# Patient Record
Sex: MALE | Race: WHITE | NOT HISPANIC OR LATINO | URBAN - METROPOLITAN AREA
[De-identification: names, ages, dates, MRNs, and addresses within clinical notes are randomized per-mention and may not be internally consistent; named-entity substitution may affect disease eponyms.]

---

## 2017-05-08 ENCOUNTER — APPOINTMENT (RX ONLY)
Dept: URBAN - METROPOLITAN AREA CLINIC 349 | Facility: CLINIC | Age: 77
Setting detail: DERMATOLOGY
End: 2017-05-08

## 2017-05-08 DIAGNOSIS — D69.2 OTHER NONTHROMBOCYTOPENIC PURPURA: ICD-10-CM

## 2017-05-08 DIAGNOSIS — D22 MELANOCYTIC NEVI: ICD-10-CM

## 2017-05-08 DIAGNOSIS — L57.0 ACTINIC KERATOSIS: ICD-10-CM

## 2017-05-08 PROBLEM — D22.5 MELANOCYTIC NEVI OF TRUNK: Status: ACTIVE | Noted: 2017-05-08

## 2017-05-08 PROCEDURE — 17003 DESTRUCT PREMALG LES 2-14: CPT

## 2017-05-08 PROCEDURE — 17000 DESTRUCT PREMALG LESION: CPT

## 2017-05-08 PROCEDURE — 99213 OFFICE O/P EST LOW 20 MIN: CPT | Mod: 25

## 2017-05-08 PROCEDURE — ? COUNSELING

## 2017-05-08 PROCEDURE — ? LIQUID NITROGEN

## 2017-05-08 ASSESSMENT — LOCATION SIMPLE DESCRIPTION DERM
LOCATION SIMPLE: RIGHT UPPER BACK
LOCATION SIMPLE: RIGHT FOREHEAD
LOCATION SIMPLE: RIGHT EAR
LOCATION SIMPLE: LEFT CHEEK
LOCATION SIMPLE: SCALP
LOCATION SIMPLE: RIGHT UPPER BACK
LOCATION SIMPLE: LEFT EAR
LOCATION SIMPLE: LEFT FOREHEAD
LOCATION SIMPLE: LEFT ZYGOMA
LOCATION SIMPLE: RIGHT CHEEK

## 2017-05-08 ASSESSMENT — LOCATION DETAILED DESCRIPTION DERM
LOCATION DETAILED: RIGHT SUPERIOR FOREHEAD
LOCATION DETAILED: LEFT CENTRAL ZYGOMA
LOCATION DETAILED: LEFT SUPERIOR FOREHEAD
LOCATION DETAILED: LEFT LATERAL MALAR CHEEK
LOCATION DETAILED: LEFT INCISURA INTERTRAGICA
LOCATION DETAILED: RIGHT INFERIOR MEDIAL UPPER BACK
LOCATION DETAILED: LEFT INFERIOR HELIX
LOCATION DETAILED: RIGHT SUPERIOR MEDIAL UPPER BACK
LOCATION DETAILED: LEFT CENTRAL FRONTAL SCALP
LOCATION DETAILED: RIGHT SUPERIOR HELIX
LOCATION DETAILED: RIGHT SUPERIOR LATERAL BUCCAL CHEEK
LOCATION DETAILED: LEFT INFERIOR FOREHEAD
LOCATION DETAILED: LEFT SUPERIOR HELIX

## 2017-05-08 ASSESSMENT — LOCATION ZONE DERM
LOCATION ZONE: TRUNK
LOCATION ZONE: TRUNK
LOCATION ZONE: FACE
LOCATION ZONE: SCALP
LOCATION ZONE: EAR

## 2017-05-08 ASSESSMENT — PAIN INTENSITY VAS: HOW INTENSE IS YOUR PAIN 0 BEING NO PAIN, 10 BEING THE MOST SEVERE PAIN POSSIBLE?: NO PAIN

## 2017-05-08 NOTE — PROCEDURE: LIQUID NITROGEN
Number Of Freeze-Thaw Cycles: 2 freeze-thaw cycles
Render Post-Care Instructions In Note?: no
Post-Care Instructions: I reviewed with the patient in detail post-care instructions. Patient is to wear sunprotection, and avoid picking at any of the treated lesions. Pt may apply Vaseline to crusted or scabbing areas.
Consent: The patient's consent was obtained including but not limited to risks of crusting, scabbing, blistering, scarring, darker or lighter pigmentary change, recurrence, incomplete removal and infection.
Detail Level: Detailed
Duration Of Freeze Thaw-Cycle (Seconds): 3

## 2017-08-01 PROBLEM — E11.8 CONTROLLED TYPE 2 DIABETES MELLITUS WITH COMPLICATION, WITH LONG-TERM CURRENT USE OF INSULIN (HCC): Status: ACTIVE | Noted: 2017-08-01

## 2017-08-01 PROBLEM — B35.1 ONYCHOMYCOSIS: Status: ACTIVE | Noted: 2017-08-01

## 2017-08-01 PROBLEM — Z79.4 CONTROLLED TYPE 2 DIABETES MELLITUS WITH COMPLICATION, WITH LONG-TERM CURRENT USE OF INSULIN (HCC): Status: ACTIVE | Noted: 2017-08-01

## 2017-10-18 ENCOUNTER — HOSPITAL ENCOUNTER (OUTPATIENT)
Dept: CT IMAGING | Age: 77
Discharge: HOME | End: 2017-10-18
Attending: INTERNAL MEDICINE
Payer: MEDICARE

## 2017-10-18 DIAGNOSIS — R07.9 RIGHT-SIDED CHEST PAIN: ICD-10-CM

## 2017-10-18 DIAGNOSIS — R10.9 FLANK PAIN: ICD-10-CM

## 2017-10-18 PROCEDURE — 74176 CT ABD & PELVIS W/O CONTRAST: CPT

## 2017-10-18 NOTE — PROGRESS NOTES
Since this test has not provided the answer -and he continues to have pain in the right side - I am ordering CT  chest  Now. Do let him know

## 2017-11-29 ENCOUNTER — ANESTHESIA EVENT (OUTPATIENT)
Dept: ENDOSCOPY | Age: 77
End: 2017-11-29
Payer: MEDICARE

## 2017-11-29 ENCOUNTER — ANESTHESIA (OUTPATIENT)
Dept: ENDOSCOPY | Age: 77
End: 2017-11-29
Payer: MEDICARE

## 2017-11-29 ENCOUNTER — HOSPITAL ENCOUNTER (OUTPATIENT)
Age: 77
Setting detail: OUTPATIENT SURGERY
Discharge: HOME | End: 2017-11-29
Attending: SURGERY | Admitting: SURGERY
Payer: MEDICARE

## 2017-11-29 VITALS
WEIGHT: 195 LBS | RESPIRATION RATE: 16 BRPM | DIASTOLIC BLOOD PRESSURE: 74 MMHG | HEIGHT: 71 IN | BODY MASS INDEX: 27.3 KG/M2 | TEMPERATURE: 98.2 F | SYSTOLIC BLOOD PRESSURE: 126 MMHG | HEART RATE: 64 BPM | OXYGEN SATURATION: 97 %

## 2017-11-29 PROCEDURE — 74011250636 HC RX REV CODE- 250/636: Performed by: ANESTHESIOLOGY

## 2017-11-29 PROCEDURE — 74011250636 HC RX REV CODE- 250/636

## 2017-11-29 PROCEDURE — 76040000025: Performed by: SURGERY

## 2017-11-29 PROCEDURE — 77030011640 HC PAD GRND REM COVD -A: Performed by: SURGERY

## 2017-11-29 PROCEDURE — 77030013991 HC SNR POLYP ENDOSC BSC -A: Performed by: SURGERY

## 2017-11-29 PROCEDURE — 76060000032 HC ANESTHESIA 0.5 TO 1 HR: Performed by: SURGERY

## 2017-11-29 PROCEDURE — 88305 TISSUE EXAM BY PATHOLOGIST: CPT | Performed by: SURGERY

## 2017-11-29 RX ORDER — SODIUM CHLORIDE, SODIUM LACTATE, POTASSIUM CHLORIDE, CALCIUM CHLORIDE 600; 310; 30; 20 MG/100ML; MG/100ML; MG/100ML; MG/100ML
1000 INJECTION, SOLUTION INTRAVENOUS CONTINUOUS
Status: DISCONTINUED | OUTPATIENT
Start: 2017-11-29 | End: 2017-11-29 | Stop reason: HOSPADM

## 2017-11-29 RX ORDER — PROPOFOL 10 MG/ML
INJECTION, EMULSION INTRAVENOUS
Status: DISCONTINUED | OUTPATIENT
Start: 2017-11-29 | End: 2017-11-29 | Stop reason: HOSPADM

## 2017-11-29 RX ORDER — PROPOFOL 10 MG/ML
INJECTION, EMULSION INTRAVENOUS AS NEEDED
Status: DISCONTINUED | OUTPATIENT
Start: 2017-11-29 | End: 2017-11-29 | Stop reason: HOSPADM

## 2017-11-29 RX ADMIN — PROPOFOL 160 MCG/KG/MIN: 10 INJECTION, EMULSION INTRAVENOUS at 08:10

## 2017-11-29 RX ADMIN — PROPOFOL 20 MG: 10 INJECTION, EMULSION INTRAVENOUS at 08:13

## 2017-11-29 RX ADMIN — PROPOFOL 20 MG: 10 INJECTION, EMULSION INTRAVENOUS at 08:10

## 2017-11-29 RX ADMIN — SODIUM CHLORIDE, SODIUM LACTATE, POTASSIUM CHLORIDE, AND CALCIUM CHLORIDE 1000 ML: 600; 310; 30; 20 INJECTION, SOLUTION INTRAVENOUS at 07:39

## 2017-11-29 NOTE — IP AVS SNAPSHOT
[address] [facility] 
[phone] Patient: Minna Hernandez MRN: [id number] PTL:1/80/4019 About your hospitalization You were admitted on:  November 29, 2017 You last received care in the:  [facility] You were discharged on:  November 29, 2017 Why you were hospitalized Your primary diagnosis was:  Not on File Things You Need To Do (next 8 weeks) Thursday Dec 07, 2017 Follow Up with Marlene Liu MD at  2:05 PM  
Where:  [id number] [facility] ([address] [facility]) Discharge Orders None A check david indicates which time of day the medication should be taken. My Medications ASK your physician about these medications Instructions Each Dose to Equal  
 Morning Noon Evening Bedtime  
 baclofen 10 mg tablet Commonly known as:  LIORESAL Your last dose was: Your next dose is: Take 1 Tab by mouth three (3) times daily for 90 days. Indications: TRIGEMINAL NEURALGIA 10 mg  
    
   
   
   
  
 COQ10 (LIPOSOMAL UBIQUINOL) PO Your last dose was: Your next dose is: Take 1 Tab by mouth daily. Stopped 11/28/17  
 1 Tab ECOTRIN LOW STRENGTH 81 mg tablet Generic drug:  aspirin delayed-release Your last dose was: Your next dose is: Take 81 mg by mouth every morning. Stopped 11/20/17 per dr Yanet Bruce 81 mg FISH OIL 1,000 mg Cap Generic drug:  omega-3 fatty acids-vitamin e Your last dose was: Your next dose is: Take 1 Cap by mouth daily. Stopped 11/28/17  
 1 Cap  
    
   
   
   
  
 gabapentin 300 mg capsule Commonly known as:  NEURONTIN Your last dose was: Your next dose is: Take 1 Cap by mouth daily for 90 days.  Take 300 mg at bedtime Indications: NEUROPATHIC PAIN  
 300 mg  
    
   
   
   
  
 glipiZIDE SR 10 mg CR tablet Commonly known as:  GLUCOTROL XL Your last dose was: Your next dose is: Take 1 Tab by mouth every morning for 90 days. Indications: type 2 diabetes mellitus 10 mg  
    
   
   
   
  
 hyoscyamine sulfate 0.125 mg tablet Commonly known as:  CYSTOSPAZ Your last dose was: Your next dose is: Take 1 Tab by mouth every four (4) hours as needed. Indications: Irritable Bowel Syndrome 0.125 mg  
    
   
   
   
  
 levothyroxine 125 mcg tablet Commonly known as:  SYNTHROID Your last dose was: Your next dose is: Take 1 Tab by mouth every morning for 90 days. Indications: hypothyroidism 125 mcg  
    
   
   
   
  
 lisinopril-hydroCHLOROthiazide 20-12.5 mg per tablet Commonly known as:  Loyce Harden Your last dose was: Your next dose is: Take 1 Tab by mouth daily. 1 Tab  
    
   
   
   
  
 meloxicam 7.5 mg tablet Commonly known as:  MOBIC Your last dose was: Your next dose is: Take 1 Tab by mouth two (2) times a day for 90 days. Indications: Gout, OSTEOARTHRITIS  
 7.5 mg  
    
   
   
   
  
 metFORMIN  mg tablet Commonly known as:  GLUCOPHAGE XR Your last dose was: Your next dose is: Take 1 Tab by mouth four (4) times daily for 90 days. Patient takes 2 BID  Indications: type 2 diabetes mellitus 500 mg  
    
   
   
   
  
 multivitamin capsule Your last dose was: Your next dose is: Take 1 Cap by mouth every morning. Stopped 11/28/17  
 1 Cap  
    
   
   
   
  
 pravastatin 40 mg tablet Commonly known as:  PRAVACHOL Your last dose was: Your next dose is: Take 1 Tab by mouth nightly for 90 days. Indications: hyperlipidemia  40 mg  
    
   
   
   
  
 rOPINIRole 0.5 mg tablet Commonly known as:  Eli Fortune Your last dose was: Your next dose is: Take 1 Tab by mouth two (2) times a day for 90 days. Indications: At Night  
 0.5 mg  
    
   
   
   
  
 VITAMIN D3 1,000 unit Cap Generic drug:  cholecalciferol Your last dose was: Your next dose is: Take 1,000 Units by mouth daily. 1000 Units Discharge Instructions Gastrointestinal Colonoscopy/Flexible Sigmoidoscopy - Lower Exam Discharge Instructions 1. Call Dr. Alcides Malave at [phone] for any problems or questions. 2. Contact the doctors office for follow up appointment as directed 3. Medication may cause drowsiness for several hours, therefore, do not drive or operate machinery for remainder of the day. 4. No alcohol today. 5. Ordinarily, you may resume regular diet and activity after exam unless otherwise specified by your physician. 6. Because of air put into your colon during exam, you may experience some abdominal distension, relieved by the passage of gas, for several hours. 7. Contact your physician if you have any of the following: 
a. Excessive amount of bleeding  large amount when having a bowel movement. Occasional specks of blood with bowel movement would not be unusual. 
b. Severe abdominal pain 
c. Fever or Chills 8. Polyp Removal  follow these additional instructions 
a. Regular diet  
b. Take Metamucil  1 tablespoon in juice every morning for 3 days 
c. No Aspirin, Advil, Aleve, Nuprin, Ibuprofen, or medications that contain these drugs for 2 weeks. Any additional instructions: Follow up appointment with Dr. Alcides Malave, Thursday December 7, 2017 at 2:05. Instructions given to Henrik Cartwright and other family members. Instructions given by:  Jeanine Barclay, RN 
 
 
[facility]O Transitions of Care Introducing [employer] [address] offers a voluntary care coordination program to provide high quality service and care to [facility] fee-for-service beneficiaries. Marleni Leon was designed to help you enhance your health and well-being through the following services: ? Transitions of Care  support for individuals who are transitioning from one care setting to another (example: Hospital to home). ? Chronic and Complex Care Coordination  support for individuals and caregivers of those with serious or chronic illnesses or with more than one chronic (ongoing) condition and those who take a number of different medications. If you meet specific medical criteria, a [address] [place] [address] Rd may call you directly to coordinate your care with your primary care physician and your other care providers. For questions about the [facility] programs, please, contact your physicians office. For general questions or additional information about Accountable Care Organizations: 
Please visit www.medicare.gov/acos. html or call 1-800-MEDICARE (8-694.232.8977) TTY users should call 6-540.848.8921. Introducing [address]! Dear Marlene De La Vega: Thank you for requesting a [employer] account. Our records indicate that you already have an active [employer] account. You can access your account anytime at [email or web address] Did you know that you can access your hospital and ER discharge instructions at any time in [employer]? You can also review all of your test results from your hospital stay or ER visit. Additional Information If you have questions, please visit the Frequently Asked Questions section of the [employer] website at [email or web address] Remember, [employer] is NOT to be used for urgent needs. For medical emergencies, dial 911. Now available from your iPhone and Android! Providers Seen During Your Hospitalization Provider Specialty Primary office phone Parag Castellanos, [address] [facility] [phone] Your Primary Care Physician (PCP) Primary Care Physician Office Phone Office Fax Munira Cuevas [phone] You are allergic to the following No active allergies Recent Documentation Height Weight BMI Smoking Status 1.803 m 88.5 kg 27.2 kg/m2 Never Smoker Emergency Contacts Name Discharge Info Relation Home Work Mobile Danyell Falcon  Spouse [3] [phone] Rebecca Petersen  Spouse [3] [phone] Patient Belongings The following personal items are in your possession at time of discharge: 
  Dental Appliances: Lowers, Uppers  Visual Aid: Glasses Please provide this summary of care documentation to your next provider. Signatures-by signing, you are acknowledging that this After Visit Summary has been reviewed with you and you have received a copy. Patient Signature:  ____________________________________________________________ Date:  ____________________________________________________________  
  
Garland Artist Provider Signature:  ____________________________________________________________ Date:  ____________________________________________________________

## 2017-11-29 NOTE — OP NOTES
[facility]   OPERATIVE REPORT       Name:  Bridgette Godwin   MR#:  [id number]   :  1940   Account #:  [de-identified]   Date of Adm:  2017       DATE OF PROCEDURE: 2017    PREOPERATIVE DIAGNOSIS: Right upper quadrant, right flank pain. POSTPROCEDURE DIAGNOSES:   1. Small, sessile rectal polyp at 25 cm, which was excised with   two passes of the snare and electrocautery. 2. Sigmoid diverticular disease without evidence of acute   diverticulitis. 3. Grade 2 internal hemorrhoids. 4. No overt source of right upper quadrant and right flank pain   found. NAME OF PROCEDURE: Colonoscopy. SURGEON: Levorn Ahumada, MD.    ANESTHESIA: Monitored anesthesia care with Dr. Oma Watkins and   Yaya, the nurse anesthetist.    ESTIMATED BLOOD LOSS: None. FINDINGS: Small sessile polyp excised with the snare and   electrocautery. HISTORY: This is a 72-year-old male who was referred by Mark Bryson MD for evaluation for a colonoscopy due to right upper   quadrant and right flank pain. He has had a negative workup thus   for and I was asked to see him for the colonoscopy. I went   through the procedure, risks of bleeding, infection, anesthesia,   perforation of colon. The patient agreed, signed a consent form. The patient underwent a bowel prep on 2017 and was seen in   the holding area prior to the procedure. He had no new   questions. He was then taken to the suite room #3 at the   [address] [place] [address]. He was placed on a   stretcher in left lateral decubitus position. Oxygen via nasal   cannula was administered. He had a time-out done identifying the   patient, surgeon, procedure and his birth date of 1940. Once everyone in the room agreed, we began the procedure. Monitored anesthesia care was done by Dr. Oma Barrera, the   CRNA.  Once the sedative effect had taken hold, a colonoscope was   advanced through the anus and all the way to the cecum. The cecum was identified with the ileocecal valve, cecal folds,   external compression. The right lower quadrant corresponded to an   indentation seen with the scope. There were no lesions noted in   the cecum, ascending colon, hepatic flexure, transverse colon. I   went back and forth in these areas to see if there was any   source for right upper quadrant and right flank pain, but there   really was none. The scope was withdrawn to the descending   colon, which was free of any abnormalities. There were some   scattered diverticula seen in the sigmoid colon. The scope was   brought back to the rectum. There was one small sessile polyp   that was snared and electrocautery. There was just a small   piece of it that was suctioned into the trap as it was quite   small itself. The scope was retroflexed. He had grade 2 internal   hemorrhoids. No evidence of any bleeding. The scope was   withdrawn. Digital rectal exam revealed good sphincter tone. The   patient tolerated the procedure well and was brought to recovery   room in stable condition. We will have him followup next week to   get the biopsy results from the polyp that was excised. Further   testing for right upper quadrant and right flank pain will   likely ensue as well.           Tenisha Dominguez MD      [facility] / [facility]   D:  11/29/2017   08:51   T:  11/29/2017   13:21   Job #:  [id number]

## 2017-11-29 NOTE — ANESTHESIA POSTPROCEDURE EVALUATION
Post-Anesthesia Evaluation and Assessment    Patient: Rufina Vilchis MRN: [id number]  SSN: [id number]    YOB: 1940  Age: 78 y.o. Sex: male       Cardiovascular Function/Vital Signs  Visit Vitals    BP 99/54    Pulse 67    Temp 36.8 °C (98.2 °F)    Resp 14    Ht 5' 11\" (1.803 m)    Wt 88.5 kg (195 lb)    SpO2 93%    BMI 27.2 kg/m2       Patient is status post total IV anesthesia anesthesia for Procedure(s):  COLONOSCOPY / BMI=27  ENDOSCOPIC POLYPECTOMY. Nausea/Vomiting: None    Postoperative hydration reviewed and adequate. Pain:  Pain Scale 1: Visual (11/29/17 0844)  Pain Intensity 1: 0 (11/29/17 0844)   Managed    Neurological Status: At baseline    Mental Status and Level of Consciousness: Arousable    Pulmonary Status:   O2 Device: Nasal cannula (11/29/17 0844)   Adequate oxygenation and airway patent    Complications related to anesthesia: None    Post-anesthesia assessment completed.  No concerns    Signed By: Karissa Ortiz MD     November 29, 2017

## 2017-11-29 NOTE — PROGRESS NOTES
Discharge instructions and discharge med list given to pt's daughter, Tejal Jeffers, voiced understanding.

## 2017-11-29 NOTE — H&P (VIEW-ONLY)
Name: Bhupinder Franz      MRN: [id number]       : 1940       Age: 78 y.o. Sex: male        Daniel Castaneda MD       CC:    Chief Complaint   Patient presents with   [address] [place] [address] Patient     right flank pain and colonoscopy consult       HPI:  The patient is referred here for a colonoscopy by Dr. Peggy Blackmon at [facility]. The patient has RUQ and R flank pain. He has had a negative ultrasound, UA, and CT scan. He has had his gallbladder out in the past. He is very uncomfortable. His last colonoscopy was 4 years ago, which he says was negative.        Family hx of colon cancer NO      Previous colonoscopy YES   BRBPR NO   Melena NO   Loss of appetite NO   Weight loss NO      Change in bowel habits NO       HISTORY:    Past Medical History:   Diagnosis Date    Abnormal clinical finding 10/17/2016    Adult hypothyroidism 10/17/2016    Allergic rhinitis     Anemia 10/17/2016    Arteriosclerosis of carotid artery 10/17/2016    Arthritis     Blood clotting disorder ([address] 10/17/2016    BPH (benign prostatic hyperplasia) 10/17/2016    Dermatophytosis of nail     Diabetes ([address]     type 2 average blood sugars 190's    Diabetic neuropathy, painful ([address] 10/17/2016    Disorder of fluid or electrolyte 10/17/2016    Dizziness and giddiness 10/17/2016    DM (diabetes mellitus), type 2, uncontrolled ([address] 10/17/2016    Dyslipidemia 10/17/2016    GERD (gastroesophageal reflux disease) 10/17/2016    Hematoma, subungual, great toe, left 10/17/2016    Hip tendinitis 10/17/2016    Hypercholesterolemia 10/17/2016    Hyperglyceridemia, pure 10/17/2016    Hypertension     Hypertension, essential, benign 10/17/2016    Hypomagnesemia     Joint derangement 10/17/2016    Lumbar stenosis with neurogenic claudication 10/17/2016    Malaise and fatigue 10/17/2016    Mononeuritis of lower extremity 10/17/2016    Muscle spasm of back 10/17/2016    Neuropathy     Nontoxic uninodular goiter 10/17/2016    Obesity, morbid ([facility] 10/17/2016    HERNÁN on CPAP 10/17/2016    PAD (peripheral artery disease) ([facility] 10/17/2016    Pain in limb 10/17/2016    Pain, foot     Peripheral neuropathy 10/17/2016    PLMD (periodic limb movement disorder) 10/17/2016    Polyarthritis/Polyarthropathy 10/17/2016    Radiculopathy, lumbosacral region 10/17/2016    RLS (restless legs syndrome) 10/17/2016    Skin cancer     sleep apnea     cpap    Thromboembolus (HCC)     blood clot in both legs 1987 ,2002    Thyroid disease     hypo    Weakness      Past Surgical History:   Procedure Laterality Date    HX APPENDECTOMY      HX CHOLECYSTECTOMY      HX HEENT      right ear surgery-to improve hearing    TOTAL KNEE ARTHROPLASTY      right and left     Prior to Admission medications    Medication Sig Start Date End Date Taking? Authorizing Provider   hyoscyamine sulfate (CYSTOSPAZ) 0.125 mg tablet Take 1 Tab by mouth every four (4) hours as needed. Indications: Irritable Bowel Syndrome 10/27/17  Yes Victorino Espinoza MD   baclofen (LIORESAL) 10 mg tablet Take 1 Tab by mouth three (3) times daily for 90 days. Indications: TRIGEMINAL NEURALGIA 10/6/17 1/4/18 Yes Victorino Espinoza MD   lisinopril-hydroCHLOROthiazide (PRINZIDE, ZESTORETIC) 20-12.5 mg per tablet Take 1 Tab by mouth daily. 10/6/17  Yes Victorino Espinoza MD   levothyroxine (SYNTHROID) 125 mcg tablet Take 1 Tab by mouth every morning for 90 days. Indications: hypothyroidism 10/6/17 1/4/18 Yes Victorino Espinoza MD   glipiZIDE SR (GLUCOTROL XL) 10 mg CR tablet Take 1 Tab by mouth every morning for 90 days. Indications: type 2 diabetes mellitus 10/6/17 1/4/18 Yes Victorino Espinoza MD   glipiZIDE SR (GLUCOTROL XL) 5 mg CR tablet Take 1 Tab by mouth daily for 90 days. Indications: type 2 diabetes mellitus 10/6/17 1/4/18 Yes Victorino Espinoza MD   gabapentin (NEURONTIN) 300 mg capsule Take 1 Cap by mouth daily for 90 days.  Take 300 mg at bedtime  Indications: NEUROPATHIC PAIN 10/6/17 1/4/18 Yes Soham Nickerson MD   metFORMIN (GLUCOPHAGE) 500 mg tablet 500mg -2tabs with breakfast,one with lunch,2 with dinner daily-total 5 per day  Indications: type 2 diabetes mellitus 10/6/17  Yes Soham Nickerson MD   rOPINIRole (REQUIP) 0.5 mg tablet Take 1 Tab by mouth two (2) times a day for 90 days. Indications: At Night 10/6/17 1/4/18 Yes Victorino Panda MD   pravastatin (PRAVACHOL) 40 mg tablet Take 1 Tab by mouth nightly for 90 days. Indications: hyperlipidemia 10/6/17 1/4/18 Yes Victorino Panda MD   meloxicam (MOBIC) 7.5 mg tablet Take 1 Tab by mouth two (2) times a day for 90 days. Indications: Gout, OSTEOARTHRITIS 10/6/17 1/4/18 Yes Victorino Panda MD   omega-3 fatty acids-vitamin e (FISH OIL) 1,000 mg cap Take 1 Cap by mouth. Yes Historical Provider   multivitamin capsule Take 1 Cap by mouth every morning. Stop 10/23/9    Yes Historical Provider   aspirin delayed-release (ECOTRIN LOW STRENGTH) 81 mg TbEC Take  by mouth every morning. Stop 10/23/9    Yes Historical Provider     Social History   Substance Use Topics    Smoking status: Never Smoker    Smokeless tobacco: Never Used    Alcohol use No     Family History   Problem Relation Age of Onset    Diabetes Mother     Cancer Father      . No Known Allergies    Physical Exam:     Visit Vitals    /65    Pulse 65    Ht 5' 11\" (1.803 m)    Wt 195 lb (88.5 kg)    BMI 27.2 kg/m2       General: Alert, oriented, cooperative white male in no acute distress. Resp: Breathing is  non-labored. Lungs clear to auscultation without wheezing or rhonchi   CV: RRR. No murmurs, rubs or gallops appreciated. Abd: soft, mild RUQ and R flank tenderness to palpation, active BS'S. Extremities: No clubbing, cyanosis or edema. Strength intact. Assessment/Plan:  Donn Metzger is a 78 y.o. male who is here for a colonoscopy. 1. Off ASA for one week, if on aspirin    2. Bowel prep    3. Colonoscopy with MAC.  I went through the risks of bleeding, perforation of the colon and cardiopulmonary problems that can develop with the use of anesthesia.     Flaca Marie MD     [facility]   11/7/2017  4:00 PM

## 2017-11-29 NOTE — DISCHARGE INSTRUCTIONS
Gastrointestinal Colonoscopy/Flexible Sigmoidoscopy - Lower Exam Discharge Instructions  1. Call Dr. Ritchie Barcenas at [phone] for any problems or questions. 2. Contact the doctors office for follow up appointment as directed  3. Medication may cause drowsiness for several hours, therefore, do not drive or operate machinery for remainder of the day. 4. No alcohol today. 5. Ordinarily, you may resume regular diet and activity after exam unless otherwise specified by your physician. 6. Because of air put into your colon during exam, you may experience some abdominal distension, relieved by the passage of gas, for several hours. 7. Contact your physician if you have any of the following:  a. Excessive amount of bleeding - large amount when having a bowel movement. Occasional specks of blood with bowel movement would not be unusual.  b. Severe abdominal pain  c. Fever or Chills  8. Polyp Removal - follow these additional instructions  a. Regular diet   b. Take Metamucil - 1 tablespoon in juice every morning for 3 days  c. No Aspirin, Advil, Aleve, Nuprin, Ibuprofen, or medications that contain these drugs for 2 weeks. Any additional instructions: Follow up appointment with Dr. Ritchie Barcenas, Thursday December 7, 2017 at 2:05. Instructions given to Wade William and other family members.   Instructions given by:  Girish Hearn RN

## 2018-02-28 PROBLEM — E11.69 HYPERLIPIDEMIA DUE TO TYPE 2 DIABETES MELLITUS (HCC): Status: ACTIVE | Noted: 2018-02-28

## 2018-02-28 PROBLEM — E78.5 HYPERLIPIDEMIA DUE TO TYPE 2 DIABETES MELLITUS (HCC): Status: ACTIVE | Noted: 2018-02-28

## 2018-05-09 ENCOUNTER — APPOINTMENT (RX ONLY)
Dept: URBAN - METROPOLITAN AREA CLINIC 349 | Facility: CLINIC | Age: 78
Setting detail: DERMATOLOGY
End: 2018-05-09

## 2018-05-09 DIAGNOSIS — D22 MELANOCYTIC NEVI: ICD-10-CM

## 2018-05-09 DIAGNOSIS — D69.2 OTHER NONTHROMBOCYTOPENIC PURPURA: ICD-10-CM

## 2018-05-09 DIAGNOSIS — L57.0 ACTINIC KERATOSIS: ICD-10-CM

## 2018-05-09 DIAGNOSIS — L82.1 OTHER SEBORRHEIC KERATOSIS: ICD-10-CM

## 2018-05-09 PROBLEM — D22.5 MELANOCYTIC NEVI OF TRUNK: Status: ACTIVE | Noted: 2018-05-09

## 2018-05-09 PROCEDURE — 17000 DESTRUCT PREMALG LESION: CPT

## 2018-05-09 PROCEDURE — 17003 DESTRUCT PREMALG LES 2-14: CPT

## 2018-05-09 PROCEDURE — ? LIQUID NITROGEN

## 2018-05-09 PROCEDURE — ? COUNSELING

## 2018-05-09 PROCEDURE — 99213 OFFICE O/P EST LOW 20 MIN: CPT | Mod: 25

## 2018-05-09 ASSESSMENT — LOCATION SIMPLE DESCRIPTION DERM
LOCATION SIMPLE: RIGHT SCALP
LOCATION SIMPLE: RIGHT UPPER BACK
LOCATION SIMPLE: LEFT CHEEK
LOCATION SIMPLE: POSTERIOR SCALP
LOCATION SIMPLE: LEFT EAR
LOCATION SIMPLE: LEFT FOREARM
LOCATION SIMPLE: RIGHT FOREHEAD
LOCATION SIMPLE: ABDOMEN
LOCATION SIMPLE: RIGHT UPPER BACK
LOCATION SIMPLE: UPPER BACK
LOCATION SIMPLE: LEFT SCALP
LOCATION SIMPLE: LEFT FOREHEAD

## 2018-05-09 ASSESSMENT — LOCATION ZONE DERM
LOCATION ZONE: TRUNK
LOCATION ZONE: EAR
LOCATION ZONE: TRUNK
LOCATION ZONE: FACE
LOCATION ZONE: ARM
LOCATION ZONE: SCALP

## 2018-05-09 ASSESSMENT — LOCATION DETAILED DESCRIPTION DERM
LOCATION DETAILED: RIGHT CENTRAL FRONTAL SCALP
LOCATION DETAILED: EPIGASTRIC SKIN
LOCATION DETAILED: LEFT MID PREAURICULAR CHEEK
LOCATION DETAILED: LEFT ANTIHELIX
LOCATION DETAILED: SUPERIOR THORACIC SPINE
LOCATION DETAILED: RIGHT INFERIOR MEDIAL UPPER BACK
LOCATION DETAILED: LEFT INFERIOR HELIX
LOCATION DETAILED: RIGHT SUPERIOR FOREHEAD
LOCATION DETAILED: LEFT SUPERIOR FOREHEAD
LOCATION DETAILED: LEFT PROXIMAL RADIAL DORSAL FOREARM
LOCATION DETAILED: RIGHT OCCIPITAL SCALP
LOCATION DETAILED: LEFT MEDIAL FRONTAL SCALP
LOCATION DETAILED: LEFT ANTITRAGUS
LOCATION DETAILED: RIGHT SUPERIOR MEDIAL UPPER BACK

## 2018-06-28 PROBLEM — R07.2 PRECORDIAL PAIN: Status: ACTIVE | Noted: 2018-06-28

## 2019-06-10 ENCOUNTER — APPOINTMENT (RX ONLY)
Dept: URBAN - METROPOLITAN AREA CLINIC 349 | Facility: CLINIC | Age: 79
Setting detail: DERMATOLOGY
End: 2019-06-10

## 2019-06-10 DIAGNOSIS — D69.2 OTHER NONTHROMBOCYTOPENIC PURPURA: ICD-10-CM

## 2019-06-10 DIAGNOSIS — L82.1 OTHER SEBORRHEIC KERATOSIS: ICD-10-CM

## 2019-06-10 DIAGNOSIS — D22 MELANOCYTIC NEVI: ICD-10-CM

## 2019-06-10 DIAGNOSIS — L57.0 ACTINIC KERATOSIS: ICD-10-CM

## 2019-06-10 PROBLEM — E78.5 HYPERLIPIDEMIA, UNSPECIFIED: Status: ACTIVE | Noted: 2019-06-10

## 2019-06-10 PROBLEM — D22.5 MELANOCYTIC NEVI OF TRUNK: Status: ACTIVE | Noted: 2019-06-10

## 2019-06-10 PROCEDURE — 17004 DESTROY PREMAL LESIONS 15/>: CPT

## 2019-06-10 PROCEDURE — ? LIQUID NITROGEN

## 2019-06-10 PROCEDURE — ? COUNSELING

## 2019-06-10 PROCEDURE — 99213 OFFICE O/P EST LOW 20 MIN: CPT | Mod: 25

## 2019-06-10 ASSESSMENT — LOCATION ZONE DERM
LOCATION ZONE: EAR
LOCATION ZONE: EAR
LOCATION ZONE: FACE
LOCATION ZONE: TRUNK
LOCATION ZONE: TRUNK

## 2019-06-10 ASSESSMENT — LOCATION DETAILED DESCRIPTION DERM
LOCATION DETAILED: LEFT FOREHEAD
LOCATION DETAILED: LEFT SUPERIOR HELIX
LOCATION DETAILED: LEFT SCAPHA
LOCATION DETAILED: RIGHT SUPERIOR HELIX
LOCATION DETAILED: RIGHT INFERIOR MEDIAL UPPER BACK
LOCATION DETAILED: RIGHT CENTRAL TEMPLE
LOCATION DETAILED: LEFT SUPERIOR HELIX
LOCATION DETAILED: RIGHT SCAPHA
LOCATION DETAILED: SUPERIOR THORACIC SPINE
LOCATION DETAILED: LEFT SUPERIOR FOREHEAD
LOCATION DETAILED: RIGHT SUPERIOR FOREHEAD
LOCATION DETAILED: LEFT SUPERIOR PREAURICULAR CHEEK
LOCATION DETAILED: RIGHT INFERIOR LATERAL FOREHEAD
LOCATION DETAILED: LEFT SUPERIOR CENTRAL MALAR CHEEK
LOCATION DETAILED: RIGHT SUPERIOR MEDIAL FOREHEAD
LOCATION DETAILED: EPIGASTRIC SKIN
LOCATION DETAILED: LEFT ANTERIOR EARLOBE
LOCATION DETAILED: RIGHT SUPERIOR LATERAL FOREHEAD
LOCATION DETAILED: LEFT SUPERIOR CRUS OF ANTIHELIX
LOCATION DETAILED: RIGHT SUPERIOR MEDIAL UPPER BACK
LOCATION DETAILED: LEFT MID PREAURICULAR CHEEK
LOCATION DETAILED: LEFT CENTRAL MALAR CHEEK
LOCATION DETAILED: LEFT EYEBROW
LOCATION DETAILED: RIGHT LATERAL FOREHEAD

## 2019-06-10 ASSESSMENT — LOCATION SIMPLE DESCRIPTION DERM
LOCATION SIMPLE: LEFT EYEBROW
LOCATION SIMPLE: UPPER BACK
LOCATION SIMPLE: LEFT FOREHEAD
LOCATION SIMPLE: RIGHT UPPER BACK
LOCATION SIMPLE: ABDOMEN
LOCATION SIMPLE: RIGHT UPPER BACK
LOCATION SIMPLE: LEFT EAR
LOCATION SIMPLE: LEFT EAR
LOCATION SIMPLE: RIGHT FOREHEAD
LOCATION SIMPLE: LEFT CHEEK
LOCATION SIMPLE: RIGHT TEMPLE
LOCATION SIMPLE: RIGHT EAR

## 2019-06-10 NOTE — PROCEDURE: LIQUID NITROGEN
Detail Level: Detailed
Render Note In Bullet Format When Appropriate: No
Number Of Freeze-Thaw Cycles: 3 freeze-thaw cycles
Consent: The patient's consent was obtained including but not limited to risks of crusting, scabbing, blistering, scarring, darker or lighter pigmentary change, recurrence, incomplete removal and infection.
Post-Care Instructions: I reviewed with the patient in detail post-care instructions. Patient is to wear sunprotection, and avoid picking at any of the treated lesions. Pt may apply Vaseline to crusted or scabbing areas.
Duration Of Freeze Thaw-Cycle (Seconds): 3

## 2019-06-20 ENCOUNTER — HOSPITAL ENCOUNTER (OUTPATIENT)
Dept: OCCUPATIONAL MEDICINE | Age: 79
Discharge: HOME | End: 2019-06-20

## 2019-06-20 DIAGNOSIS — T14.90XA INJURY: ICD-10-CM

## 2019-11-04 NOTE — ANESTHESIA PREPROCEDURE EVALUATION
Anesthetic History   No history of anesthetic complications            Review of Systems / Medical History      Pulmonary        Sleep apnea: CPAP           Neuro/Psych             Comments: Peripheral neuropathy    RLS Cardiovascular    Hypertension              Exercise tolerance: >4 METS  Comments: H/O DVTs mayela LEs   GI/Hepatic/Renal     GERD          Comments: BPH Endo/Other    Diabetes: type 2  Hypothyroidism  Obesity, arthritis and anemia     Other Findings            Physical Exam    Airway  Mallampati: II  TM Distance: 4 - 6 cm  Neck ROM: normal range of motion   Mouth opening: Normal     Cardiovascular    Rhythm: regular           Dental    Dentition: Full upper dentures and Full lower dentures     Pulmonary  Breath sounds clear to auscultation               Abdominal  GI exam deferred       Other Findings            Anesthetic Plan    ASA: 3  Anesthesia type: total IV anesthesia          Induction: Intravenous  Anesthetic plan and risks discussed with: Patient Pt discharged in stable condition with family

## 2020-06-10 ENCOUNTER — APPOINTMENT (RX ONLY)
Dept: URBAN - METROPOLITAN AREA CLINIC 349 | Facility: CLINIC | Age: 80
Setting detail: DERMATOLOGY
End: 2020-06-10

## 2020-06-10 DIAGNOSIS — D69.2 OTHER NONTHROMBOCYTOPENIC PURPURA: ICD-10-CM

## 2020-06-10 DIAGNOSIS — D22 MELANOCYTIC NEVI: ICD-10-CM

## 2020-06-10 DIAGNOSIS — B07.8 OTHER VIRAL WARTS: ICD-10-CM

## 2020-06-10 DIAGNOSIS — L82.1 OTHER SEBORRHEIC KERATOSIS: ICD-10-CM

## 2020-06-10 DIAGNOSIS — L57.0 ACTINIC KERATOSIS: ICD-10-CM

## 2020-06-10 PROBLEM — C44.329 SQUAMOUS CELL CARCINOMA OF SKIN OF OTHER PARTS OF FACE: Status: ACTIVE | Noted: 2020-06-10

## 2020-06-10 PROBLEM — E13.9 OTHER SPECIFIED DIABETES MELLITUS WITHOUT COMPLICATIONS: Status: ACTIVE | Noted: 2020-06-10

## 2020-06-10 PROBLEM — I10 ESSENTIAL (PRIMARY) HYPERTENSION: Status: ACTIVE | Noted: 2020-06-10

## 2020-06-10 PROBLEM — D22.5 MELANOCYTIC NEVI OF TRUNK: Status: ACTIVE | Noted: 2020-06-10

## 2020-06-10 PROCEDURE — ? COUNSELING

## 2020-06-10 PROCEDURE — 88305 TISSUE EXAM BY PATHOLOGIST: CPT

## 2020-06-10 PROCEDURE — 99213 OFFICE O/P EST LOW 20 MIN: CPT | Mod: 25

## 2020-06-10 PROCEDURE — ? LIQUID NITROGEN

## 2020-06-10 PROCEDURE — ? SHAVE REMOVAL AND DESTRUCTION

## 2020-06-10 PROCEDURE — 17004 DESTROY PREMAL LESIONS 15/>: CPT | Mod: 59

## 2020-06-10 PROCEDURE — ? PATHOLOGY BILLING

## 2020-06-10 PROCEDURE — 17110 DESTRUCTION B9 LES UP TO 14: CPT | Mod: 59

## 2020-06-10 PROCEDURE — A4550 SURGICAL TRAYS: HCPCS

## 2020-06-10 PROCEDURE — 17282 DSTR MAL LS F/E/E/N/L/M1.1-2: CPT

## 2020-06-10 ASSESSMENT — LOCATION DETAILED DESCRIPTION DERM
LOCATION DETAILED: LEFT LATERAL FOREHEAD
LOCATION DETAILED: RIGHT SUPERIOR MEDIAL FOREHEAD
LOCATION DETAILED: LEFT CENTRAL FRONTAL SCALP
LOCATION DETAILED: LEFT SUPERIOR CRUS OF ANTIHELIX
LOCATION DETAILED: RIGHT SUPERIOR HELIX
LOCATION DETAILED: RIGHT SUPERIOR FOREHEAD
LOCATION DETAILED: LEFT SUPERIOR FOREHEAD
LOCATION DETAILED: LEFT DISTAL PALMAR MIDDLE FINGER
LOCATION DETAILED: LEFT MID PREAURICULAR CHEEK
LOCATION DETAILED: LEFT LATERAL MALAR CHEEK
LOCATION DETAILED: LEFT SUPERIOR CENTRAL MALAR CHEEK
LOCATION DETAILED: LEFT MEDIAL FOREHEAD
LOCATION DETAILED: LEFT SUPERIOR LATERAL MALAR CHEEK
LOCATION DETAILED: LEFT FOREHEAD
LOCATION DETAILED: LEFT DISTAL PALMAR MIDDLE FINGER
LOCATION DETAILED: LEFT INFERIOR MEDIAL FOREHEAD
LOCATION DETAILED: LEFT SCAPHA
LOCATION DETAILED: SUPERIOR THORACIC SPINE
LOCATION DETAILED: RIGHT SUPERIOR MEDIAL UPPER BACK
LOCATION DETAILED: RIGHT LATERAL FRONTAL SCALP
LOCATION DETAILED: LEFT INFERIOR LATERAL FOREHEAD
LOCATION DETAILED: RIGHT INFERIOR MEDIAL UPPER BACK
LOCATION DETAILED: LEFT SUPERIOR FOREHEAD
LOCATION DETAILED: LEFT LATERAL FOREHEAD
LOCATION DETAILED: EPIGASTRIC SKIN

## 2020-06-10 ASSESSMENT — LOCATION SIMPLE DESCRIPTION DERM
LOCATION SIMPLE: ABDOMEN
LOCATION SIMPLE: RIGHT SCALP
LOCATION SIMPLE: LEFT SCALP
LOCATION SIMPLE: LEFT FOREHEAD
LOCATION SIMPLE: LEFT MIDDLE FINGER
LOCATION SIMPLE: RIGHT UPPER BACK
LOCATION SIMPLE: LEFT MIDDLE FINGER
LOCATION SIMPLE: RIGHT UPPER BACK
LOCATION SIMPLE: LEFT CHEEK
LOCATION SIMPLE: RIGHT EAR
LOCATION SIMPLE: LEFT EAR
LOCATION SIMPLE: RIGHT FOREHEAD
LOCATION SIMPLE: UPPER BACK
LOCATION SIMPLE: LEFT FOREHEAD
LOCATION SIMPLE: LEFT CHEEK

## 2020-06-10 ASSESSMENT — LOCATION ZONE DERM
LOCATION ZONE: EAR
LOCATION ZONE: TRUNK
LOCATION ZONE: TRUNK
LOCATION ZONE: SCALP
LOCATION ZONE: FACE
LOCATION ZONE: FACE
LOCATION ZONE: FINGER
LOCATION ZONE: FINGER

## 2020-06-10 NOTE — PROCEDURE: LIQUID NITROGEN
Include Z78.9 (Other Specified Conditions Influencing Health Status) As An Associated Diagnosis?: Yes
Post-Care Instructions: I reviewed with the patient in detail post-care instructions. Patient is to wear sunprotection, and avoid picking at any of the treated lesions. Pt may apply Vaseline to crusted or scabbing areas.
Render Post-Care Instructions In Note?: no
Medical Necessity Clause: This procedure was medically necessary because the lesions that were treated were:
Detail Level: Detailed
Medical Necessity Information: It is in your best interest to select a reason for this procedure from the list below. All of these items fulfill various CMS LCD requirements except the new and changing color options.
Duration Of Freeze Thaw-Cycle (Seconds): 3
Consent: The patient's consent was obtained including but not limited to risks of crusting, scabbing, blistering, scarring, darker or lighter pigmentary change, recurrence, incomplete removal and infection.
Number Of Freeze-Thaw Cycles: 3 freeze-thaw cycles
Duration Of Freeze Thaw-Cycle (Seconds): 2
Number Of Freeze-Thaw Cycles: 2 freeze-thaw cycles

## 2020-06-10 NOTE — PROCEDURE: PATHOLOGY BILLING
Immunohistochemistry ([id number] and [id number]) billing is not performed here. Please use the Immunohistochemistry Stain Billing plan to accomplish this. Immunohistochemistry ([id number] and [id number]) billing is not performed here. Please use the Immunohistochemistry Stain Billing plan to accomplish this.

## 2020-06-10 NOTE — PROCEDURE: SHAVE REMOVAL AND DESTRUCTION
Cautery Type: electrodesiccation
Bill As?: Note: Bill Malignant Destruction If Path Confirms Malignant Lesion. Only Bill As Shave Removal If Path Comes Back Benign. Do Not Bill Shave Removal On Malignant Lesions.: Malignant Destruction
Anesthesia Type: 2% lidocaine with epinephrine
Accession #: dr aranda read
Detail Level: Detailed
Size After Destruction (Required For Destruction Billing): 1.3
Anesthesia Volume In Cc: 0.2
Billing Type: Third-Party Bill
Size Of Lesion In Cm: 0
Render Path Notes In Note?: No
Was Curettage Performed?: Yes
Wound Care: Vaseline
Notification Instructions: Patient will be notified of biopsy results. However, patient instructed to call the office if not contacted within 2 weeks.
Number Of Curettages: 2
Hemostasis: Electrocautery
Consent: Written consent was obtained and risks were reviewed including but not limited to scarring, infection, bleeding, scabbing, incomplete removal, nerve damage and allergy to anesthesia.
Post-Care Instructions: I reviewed with the patient in detail post-care instructions. Patient is to keep the biopsy site dry overnight, and then apply bacitracin twice daily until healed. Patient may apply hydrogen peroxide soaks to remove any crusting.  After the procedure, the patient was observed for 5-10 minutes and was oriented to,person, place and time and denied feeling dizzy, queasy and stated that they were not going to faint
Dressing: Band-Aid

## 2020-12-16 ENCOUNTER — APPOINTMENT (RX ONLY)
Dept: URBAN - METROPOLITAN AREA CLINIC 349 | Facility: CLINIC | Age: 80
Setting detail: DERMATOLOGY
End: 2020-12-16

## 2020-12-16 DIAGNOSIS — Z12.83 ENCOUNTER FOR SCREENING FOR MALIGNANT NEOPLASM OF SKIN: ICD-10-CM

## 2020-12-16 DIAGNOSIS — L82.1 OTHER SEBORRHEIC KERATOSIS: ICD-10-CM

## 2020-12-16 DIAGNOSIS — D18.0 HEMANGIOMA: ICD-10-CM

## 2020-12-16 DIAGNOSIS — L57.0 ACTINIC KERATOSIS: ICD-10-CM

## 2020-12-16 DIAGNOSIS — Z85.828 PERSONAL HISTORY OF OTHER MALIGNANT NEOPLASM OF SKIN: ICD-10-CM

## 2020-12-16 PROBLEM — D18.01 HEMANGIOMA OF SKIN AND SUBCUTANEOUS TISSUE: Status: ACTIVE | Noted: 2020-12-16

## 2020-12-16 PROCEDURE — 17004 DESTROY PREMAL LESIONS 15/>: CPT

## 2020-12-16 PROCEDURE — ? LIQUID NITROGEN

## 2020-12-16 PROCEDURE — ? COUNSELING

## 2020-12-16 PROCEDURE — 99213 OFFICE O/P EST LOW 20 MIN: CPT | Mod: 25

## 2020-12-16 ASSESSMENT — LOCATION DETAILED DESCRIPTION DERM
LOCATION DETAILED: RIGHT MEDIAL INFERIOR CHEST
LOCATION DETAILED: RIGHT SUPERIOR HELIX
LOCATION DETAILED: LEFT SUPERIOR CENTRAL MALAR CHEEK
LOCATION DETAILED: LEFT SUPERIOR FOREHEAD
LOCATION DETAILED: LEFT LATERAL MANDIBULAR CHEEK
LOCATION DETAILED: STERNUM
LOCATION DETAILED: RIGHT MEDIAL ZYGOMA
LOCATION DETAILED: RIGHT INFERIOR CRUS OF ANTIHELIX
LOCATION DETAILED: NASAL ROOT
LOCATION DETAILED: LEFT INFERIOR LATERAL FOREHEAD
LOCATION DETAILED: RIGHT SUPERIOR CENTRAL BUCCAL CHEEK
LOCATION DETAILED: RIGHT SUPERIOR MEDIAL UPPER BACK
LOCATION DETAILED: RIGHT ANTERIOR EARLOBE
LOCATION DETAILED: RIGHT INFERIOR HELIX
LOCATION DETAILED: LEFT TRAGUS
LOCATION DETAILED: LEFT MEDIAL FOREHEAD
LOCATION DETAILED: LEFT FOREHEAD
LOCATION DETAILED: RIGHT FOREHEAD
LOCATION DETAILED: LEFT MID PREAURICULAR CHEEK
LOCATION DETAILED: LEFT CENTRAL PARIETAL SCALP
LOCATION DETAILED: LEFT DISTAL RADIAL DORSAL FOREARM
LOCATION DETAILED: LEFT MEDIAL SUPERIOR CHEST
LOCATION DETAILED: RIGHT SUPERIOR FOREHEAD
LOCATION DETAILED: LEFT CENTRAL FRONTAL SCALP
LOCATION DETAILED: RIGHT SUPERIOR PREAURICULAR CHEEK
LOCATION DETAILED: EPIGASTRIC SKIN
LOCATION DETAILED: LEFT MEDIAL ZYGOMA

## 2020-12-16 ASSESSMENT — LOCATION SIMPLE DESCRIPTION DERM
LOCATION SIMPLE: LEFT FOREHEAD
LOCATION SIMPLE: RIGHT EAR
LOCATION SIMPLE: NOSE
LOCATION SIMPLE: RIGHT UPPER BACK
LOCATION SIMPLE: SCALP
LOCATION SIMPLE: LEFT ZYGOMA
LOCATION SIMPLE: ABDOMEN
LOCATION SIMPLE: CHEST
LOCATION SIMPLE: RIGHT FOREHEAD
LOCATION SIMPLE: LEFT CHEEK
LOCATION SIMPLE: LEFT FOREARM
LOCATION SIMPLE: LEFT EAR
LOCATION SIMPLE: RIGHT CHEEK
LOCATION SIMPLE: RIGHT ZYGOMA
LOCATION SIMPLE: LEFT SCALP

## 2020-12-16 ASSESSMENT — LOCATION ZONE DERM
LOCATION ZONE: ARM
LOCATION ZONE: FACE
LOCATION ZONE: SCALP
LOCATION ZONE: TRUNK
LOCATION ZONE: EAR
LOCATION ZONE: NOSE

## 2020-12-16 ASSESSMENT — PAIN INTENSITY VAS: HOW INTENSE IS YOUR PAIN 0 BEING NO PAIN, 10 BEING THE MOST SEVERE PAIN POSSIBLE?: 1/10 PAIN

## 2020-12-16 NOTE — PROCEDURE: LIQUID NITROGEN
Consent: The patient's consent was obtained including but not limited to risks of crusting, scabbing, blistering, scarring, darker or lighter pigmentary change, recurrence, incomplete removal and infection.
Number Of Freeze-Thaw Cycles: 2 freeze-thaw cycles
Post-Care Instructions: I reviewed with the patient in detail post-care instructions. Patient is to wear sunprotection, and avoid picking at any of the treated lesions. Pt may apply Vaseline to crusted or scabbing areas.
Duration Of Freeze Thaw-Cycle (Seconds): 3
Render Post-Care Instructions In Note?: no
Detail Level: Detailed

## 2021-03-02 PROBLEM — E66.9 OBESITY (BMI 30.0-34.9): Status: ACTIVE | Noted: 2021-03-02

## 2021-03-02 PROBLEM — R35.1 NOCTURIA: Status: ACTIVE | Noted: 2021-03-02

## 2021-03-02 PROBLEM — G47.10 HYPERSOMNIA: Status: ACTIVE | Noted: 2021-03-02

## 2021-06-21 ENCOUNTER — APPOINTMENT (RX ONLY)
Dept: URBAN - METROPOLITAN AREA CLINIC 349 | Facility: CLINIC | Age: 81
Setting detail: DERMATOLOGY
End: 2021-06-21

## 2021-06-21 DIAGNOSIS — D18.0 HEMANGIOMA: ICD-10-CM

## 2021-06-21 DIAGNOSIS — L57.0 ACTINIC KERATOSIS: ICD-10-CM

## 2021-06-21 DIAGNOSIS — Z12.83 ENCOUNTER FOR SCREENING FOR MALIGNANT NEOPLASM OF SKIN: ICD-10-CM

## 2021-06-21 DIAGNOSIS — L82.1 OTHER SEBORRHEIC KERATOSIS: ICD-10-CM

## 2021-06-21 DIAGNOSIS — Z85.828 PERSONAL HISTORY OF OTHER MALIGNANT NEOPLASM OF SKIN: ICD-10-CM

## 2021-06-21 PROBLEM — M12.9 ARTHROPATHY, UNSPECIFIED: Status: ACTIVE | Noted: 2021-06-21

## 2021-06-21 PROBLEM — D18.01 HEMANGIOMA OF SKIN AND SUBCUTANEOUS TISSUE: Status: ACTIVE | Noted: 2021-06-21

## 2021-06-21 PROCEDURE — ? LIQUID NITROGEN

## 2021-06-21 PROCEDURE — ? COUNSELING

## 2021-06-21 PROCEDURE — 99213 OFFICE O/P EST LOW 20 MIN: CPT | Mod: 25

## 2021-06-21 PROCEDURE — 17004 DESTROY PREMAL LESIONS 15/>: CPT

## 2021-06-21 ASSESSMENT — LOCATION DETAILED DESCRIPTION DERM
LOCATION DETAILED: RIGHT SUPERIOR POSTERIOR HELIX
LOCATION DETAILED: LEFT MEDIAL FRONTAL SCALP
LOCATION DETAILED: SUPERIOR MID FOREHEAD
LOCATION DETAILED: MID-FRONTAL SCALP
LOCATION DETAILED: LEFT CENTRAL FRONTAL SCALP
LOCATION DETAILED: LEFT SUPERIOR MEDIAL FOREHEAD
LOCATION DETAILED: RIGHT MEDIAL INFERIOR CHEST
LOCATION DETAILED: RIGHT INFERIOR POSTERIOR HELIX
LOCATION DETAILED: LEFT MEDIAL SUPERIOR CHEST
LOCATION DETAILED: LEFT MEDIAL FRONTAL SCALP
LOCATION DETAILED: LEFT CENTRAL MALAR CHEEK
LOCATION DETAILED: RIGHT SUPERIOR MEDIAL FOREHEAD
LOCATION DETAILED: LEFT LATERAL MALAR CHEEK
LOCATION DETAILED: LEFT INFERIOR LATERAL FOREHEAD
LOCATION DETAILED: RIGHT SUPERIOR MEDIAL UPPER BACK
LOCATION DETAILED: EPIGASTRIC SKIN
LOCATION DETAILED: LEFT LATERAL MALAR CHEEK
LOCATION DETAILED: RIGHT SUPERIOR MEDIAL FOREHEAD
LOCATION DETAILED: LEFT INFERIOR TEMPLE
LOCATION DETAILED: LEFT SUPERIOR FOREHEAD
LOCATION DETAILED: LEFT FOREHEAD
LOCATION DETAILED: LEFT SUPERIOR CRUS OF ANTIHELIX
LOCATION DETAILED: STERNUM
LOCATION DETAILED: LEFT CENTRAL FRONTAL SCALP
LOCATION DETAILED: RIGHT MEDIAL FOREHEAD

## 2021-06-21 ASSESSMENT — LOCATION ZONE DERM
LOCATION ZONE: FACE
LOCATION ZONE: EAR
LOCATION ZONE: TRUNK
LOCATION ZONE: FACE
LOCATION ZONE: SCALP
LOCATION ZONE: SCALP

## 2021-06-21 ASSESSMENT — LOCATION SIMPLE DESCRIPTION DERM
LOCATION SIMPLE: LEFT EAR
LOCATION SIMPLE: RIGHT FOREHEAD
LOCATION SIMPLE: LEFT SCALP
LOCATION SIMPLE: ANTERIOR SCALP
LOCATION SIMPLE: ABDOMEN
LOCATION SIMPLE: LEFT FOREHEAD
LOCATION SIMPLE: RIGHT FOREHEAD
LOCATION SIMPLE: LEFT CHEEK
LOCATION SIMPLE: RIGHT EAR
LOCATION SIMPLE: LEFT SCALP
LOCATION SIMPLE: LEFT FOREHEAD
LOCATION SIMPLE: LEFT CHEEK
LOCATION SIMPLE: CHEST
LOCATION SIMPLE: LEFT TEMPLE
LOCATION SIMPLE: SUPERIOR FOREHEAD
LOCATION SIMPLE: RIGHT UPPER BACK

## 2021-10-23 ENCOUNTER — APPOINTMENT (OUTPATIENT)
Dept: GENERAL RADIOLOGY | Age: 81
End: 2021-10-23
Attending: PHYSICIAN ASSISTANT
Payer: MEDICARE

## 2021-10-23 ENCOUNTER — HOSPITAL ENCOUNTER (EMERGENCY)
Age: 81
Discharge: HOME | End: 2021-10-23
Attending: EMERGENCY MEDICINE
Payer: MEDICARE

## 2021-10-23 VITALS
OXYGEN SATURATION: 98 % | HEART RATE: 65 BPM | DIASTOLIC BLOOD PRESSURE: 81 MMHG | BODY MASS INDEX: 30.8 KG/M2 | SYSTOLIC BLOOD PRESSURE: 164 MMHG | WEIGHT: 220 LBS | RESPIRATION RATE: 18 BRPM | TEMPERATURE: 97.4 F | HEIGHT: 71 IN

## 2021-10-23 DIAGNOSIS — S82.891A CLOSED FRACTURE OF RIGHT ANKLE, INITIAL ENCOUNTER: Primary | ICD-10-CM

## 2021-10-23 PROCEDURE — 73610 X-RAY EXAM OF ANKLE: CPT

## 2021-10-23 PROCEDURE — 99284 EMERGENCY DEPT VISIT MOD MDM: CPT

## 2021-10-23 PROCEDURE — 74011250637 HC RX REV CODE- 250/637: Performed by: PHYSICIAN ASSISTANT

## 2021-10-23 PROCEDURE — 75810000053 HC SPLINT APPLICATION

## 2021-10-23 RX ORDER — ACETAMINOPHEN 500 MG
1000 TABLET ORAL
Status: COMPLETED | OUTPATIENT
Start: 2021-10-23 | End: 2021-10-23

## 2021-10-23 RX ORDER — HYDROCODONE BITARTRATE AND ACETAMINOPHEN 5; 325 MG/1; MG/1
1 TABLET ORAL
Qty: 12 TABLET | Refills: 0 | Status: SHIPPED | OUTPATIENT
Start: 2021-10-23 | End: 2021-10-30

## 2021-10-23 RX ADMIN — ACETAMINOPHEN 1000 MG: 500 TABLET ORAL at 19:03

## 2021-10-23 NOTE — ED TRIAGE NOTES
Pt to triage in wheelchair with mask in place. Pt reports a fall today with injury to R foot and leg. Reports going to Urgent Care where imaging revealed displaced medial malleolus and fibula fracture. Pt sent here for further evaluation. Pt denies hitting head. Denies LOC.

## 2021-10-23 NOTE — ED PROVIDER NOTES
Patient slipped on some acorns this morning while going to feed his cats and subsequently twisted his right ankle. He was unable to get up following this. A neighbor helped him up. His family brought him in. He had gone to urgent care for this and they sent him here because of the fracture. There are no open wounds. No pain above or below the ankle. He did not hit his head nor did he have any loss of consciousness. No chest pain, shortness of breath, abdominal pain, back pain or other new symptoms. Patient declines pain medication here. The history is provided by the patient. Ankle Injury   This is a new problem. The current episode started 6 to 12 hours ago. The problem occurs constantly. The problem has not changed since onset. The pain is present in the right ankle. The quality of the pain is described as aching. The pain is at a severity of 3/10. The pain is mild. Associated symptoms include limited range of motion and stiffness. Pertinent negatives include no numbness, no tingling, no itching, no back pain and no neck pain. The symptoms are aggravated by activity. He has tried nothing for the symptoms. There has been a history of trauma.         Past Medical History:   Diagnosis Date    Abnormal clinical finding 10/17/2016    Allergic rhinitis     Anemia 10/17/2016    Arteriosclerosis of carotid artery 10/17/2016    Arthritis     hands    BPH (benign prostatic hyperplasia) 10/17/2016    Dermatophytosis of nail     Diabetes ([address]     type 2 average blood sugars 190's    Diabetic neuropathy, painful ([address] 10/17/2016    Disorder of fluid or electrolyte 10/17/2016    Dizziness and giddiness 10/17/2016    DM (diabetes mellitus), type 2, uncontrolled ([address] 10/17/2016    type2--- sqbs avg am--212--- hypo  70's    Dyslipidemia 10/17/2016    GERD (gastroesophageal reflux disease) 10/17/2016    Hematoma, subungual, great toe, left 10/17/2016    Hip tendinitis 10/17/2016    Hypercholesterolemia 10/17/2016    Hypertension     Hypertension, essential, benign 10/17/2016    controlled with med    Hypomagnesemia     Joint derangement 10/17/2016    Lumbar stenosis with neurogenic claudication 10/17/2016    Malaise and fatigue 10/17/2016    Mononeuritis of lower extremity 10/17/2016    Muscle spasm of back 10/17/2016    Neuropathy     Nontoxic uninodular goiter 10/17/2016    PAD (peripheral artery disease) ([address] 10/17/2016    Pain in limb 10/17/2016    Pain, foot     Peripheral neuropathy 10/17/2016    PLMD (periodic limb movement disorder) 10/17/2016    Polyarthritis/Polyarthropathy 10/17/2016    Radiculopathy, lumbosacral region 10/17/2016    RLS (restless legs syndrome) 10/17/2016    Skin cancer     sleep apnea     wears cpap \" most \" of the time    [facility])     blood clot in both legs 1987 ,2002    Thyroid disease     hypo    Weakness        Past Surgical History:   Procedure Laterality Date    COLONOSCOPY N/A 11/29/2017    COLONOSCOPY / BMI=27 performed by Cici Mitchell [facility] at [facility] ENDOSCOPY    HX APPENDECTOMY      HX CHOLECYSTECTOMY      HX COLONOSCOPY  11/29/2017    HX HEENT      right ear surgery-to improve hearing    HX OTHER SURGICAL      colonscopy     TOTAL KNEE ARTHROPLASTY  2002    right and left         Family History:   Problem Relation Age of Onset    Diabetes Mother     Cancer Father        Social History     Socioeconomic History    Marital status:      Spouse name: Not on file    Number of children: Not on file    Years of education: Not on file    Highest education level: Not on file   Occupational History    Not on file   Tobacco Use    Smoking status: Never Smoker    Smokeless tobacco: Never Used   Substance and Sexual Activity    Alcohol use: No    Drug use: No    Sexual activity: Not on file   Other Topics Concern    Not on file   Social History Narrative    Not on file     Social Determinants of Health Financial Resource Strain:     Difficulty of Paying Living Expenses:    Food Insecurity:     Worried About Running Out of Food in the Last Year:     [address] in the Last Year:    Transportation Needs:     Lack of Transportation (Medical):  Lack of Transportation (Non-Medical):    Physical Activity:     Days of Exercise per Week:     Minutes of Exercise per Session:    Stress:     Feeling of Stress :    Social Connections:     Frequency of Communication with Friends and Family:     Frequency of Social Gatherings with Friends and Family:     Attends [employer] Services:     Active Member of Clubs or Organizations:     Attends Club or Organization Meetings:     Marital Status:    Intimate Partner Violence:     Fear of Current or Ex-Partner:     Emotionally Abused:     Physically Abused:     Sexually Abused: ALLERGIES: Patient has no known allergies. Review of Systems   Constitutional: Negative. HENT: Negative. Eyes: Negative. Respiratory: Negative. Cardiovascular: Negative. Gastrointestinal: Negative. Genitourinary: Negative. Musculoskeletal: Positive for stiffness. Negative for back pain and neck pain. Right ankle pain   Skin: Negative. Negative for itching. Neurological: Negative. Negative for tingling and numbness. Psychiatric/Behavioral: Negative. All other systems reviewed and are negative. Vitals:    10/23/21 1656   BP: 122/67   Pulse: 69   Resp: 18   Temp: 97.4 °F (36.3 °C)   SpO2: 99%   Weight: 99.8 kg (220 lb)   Height: 5' 11\" (1.803 m)            Physical Exam  Vitals and nursing note reviewed. Constitutional:       Appearance: He is well-developed. HENT:      Head: Normocephalic and atraumatic.       Right Ear: External ear normal.      Left Ear: External ear normal.      Nose: Nose normal.      Mouth/Throat:      Mouth: Mucous membranes are moist.   Eyes:      Conjunctiva/sclera: Conjunctivae normal.      Pupils: Pupils are equal, round, and reactive to light. Cardiovascular:      Rate and Rhythm: Normal rate. Pulses: Normal pulses. Pulmonary:      Effort: Pulmonary effort is normal.   Abdominal:      General: Abdomen is flat. Palpations: Abdomen is soft. Musculoskeletal:         General: Swelling, tenderness and signs of injury present. No deformity. Cervical back: Normal range of motion and neck supple. Right lower leg: No edema. Left lower leg: No edema. Legs:    Skin:     General: Skin is warm and dry. Capillary Refill: Capillary refill takes less than 2 seconds. Neurological:      General: No focal deficit present. Mental Status: He is alert and oriented to person, place, and time. Deep Tendon Reflexes: Reflexes are normal and symmetric. Psychiatric:         Mood and Affect: Mood normal.         Behavior: Behavior normal.         Thought Content:  Thought content normal.         Judgment: Judgment normal.          MDM  Number of Diagnoses or Management Options     Amount and/or Complexity of Data Reviewed  Tests in the radiology section of CPT®: ordered    Risk of Complications, Morbidity, and/or Mortality  Presenting problems: moderate  Diagnostic procedures: moderate  Management options: moderate           Splint, Short Leg    Date/Time: 10/23/2021 7:16 PM  Performed by: JACKY Pelletier  Authorized by: JACKY Pelletier     Consent:     Consent obtained:  Verbal    Consent given by:  Patient    Risks discussed:  Discoloration, numbness, pain and swelling    Alternatives discussed:  No treatment, delayed treatment, alternative treatment, observation and referral  Pre-procedure details:     Sensation:  Normal  Procedure details:     Laterality:  Right    Location:  Ankle    Ankle:  R ankle    Splint type:  Sugar tong and short leg    Supplies:  Ortho-Glass  Post-procedure details:     Pain:  Improved    Sensation:  Normal    Patient tolerance of procedure: Tolerated well, no immediate complications  Comments:      BEVERLY Cox into help splint          The patient was observed in the ED. Results Reviewed:  XR ANKLE RT MIN 3 V   Final Result   Unstable trimalleolar fracture. 6:27 PM Shery Brittle PA perfect served regarding patient. We discussed the history, physical exam and workup. 6:38 PM He perfect served back, states \"if it is closed, it looks like it can be splinted in place and follow up with Dr. Jazzy Riley or Mireya per Dr. Julio Khoury. \" I verified if it needed to be reduced or splint as it is. He states Dr. Julio Khoury said the talus looks reduced and to splint as it is and send for follow up. Posterior lower leg and sugar tong splint placed by BEVERLY Cox and myself. He will be made non weight bearing by using a walker. Prescription written. I discussed the results of all labs, procedures, radiographs, and treatments with the patient and available family. Treatment plan is agreed upon and the patient is ready for discharge. All voiced understanding of the discharge plan and medication instructions or changes as appropriate. Questions about treatment in the ED were answered. All were encouraged to return should symptoms worsen or new problems develop.

## 2021-10-23 NOTE — DISCHARGE INSTRUCTIONS
Rest, ice, elevate, avoid painful activities. ED if worse. Follow up with Ortho for recheck. Do not remove splint or get wet. No weight on ankle, use walker or crutches to keep weight off.

## 2021-10-23 NOTE — ED NOTES
I have reviewed discharge instructions with the patient. The patient verbalized understanding. Patient left ED via Discharge Method: wheelchair to Home with (family). Opportunity for questions and clarification provided. Patient given 2 scripts. To continue your aftercare when you leave the hospital, you may receive an automated call from our care team to check in on how you are doing. This is a free service and part of our promise to provide the best care and service to meet your aftercare needs.  If you have questions, or wish to unsubscribe from this service please call [phone]. Thank you for Choosing our [facility].

## 2021-10-25 ENCOUNTER — ANESTHESIA EVENT (OUTPATIENT)
Dept: SURGERY | Age: 81
DRG: 493 | End: 2021-10-25
Payer: MEDICARE

## 2021-10-26 ENCOUNTER — ANESTHESIA (OUTPATIENT)
Dept: SURGERY | Age: 81
DRG: 493 | End: 2021-10-26
Payer: MEDICARE

## 2021-10-26 ENCOUNTER — HOSPITAL ENCOUNTER (INPATIENT)
Age: 81
LOS: 4 days | Discharge: NURSING, REHAB OR LONG-TERM CARE | DRG: 493 | End: 2021-10-30
Attending: ORTHOPAEDIC SURGERY | Admitting: ORTHOPAEDIC SURGERY
Payer: MEDICARE

## 2021-10-26 ENCOUNTER — APPOINTMENT (OUTPATIENT)
Dept: GENERAL RADIOLOGY | Age: 81
DRG: 493 | End: 2021-10-26
Attending: ORTHOPAEDIC SURGERY
Payer: MEDICARE

## 2021-10-26 DIAGNOSIS — G89.18 ACUTE POST-OPERATIVE PAIN: Primary | ICD-10-CM

## 2021-10-26 PROBLEM — S82.891A ANKLE FRACTURE, RIGHT: Status: ACTIVE | Noted: 2021-10-26

## 2021-10-26 PROBLEM — E11.9 TYPE 2 DIABETES MELLITUS, WITHOUT LONG-TERM CURRENT USE OF INSULIN (HCC): Status: ACTIVE | Noted: 2017-08-01

## 2021-10-26 PROBLEM — E11.65 TYPE 2 DIABETES MELLITUS WITH HYPERGLYCEMIA, WITHOUT LONG-TERM CURRENT USE OF INSULIN (HCC): Status: ACTIVE | Noted: 2021-10-26

## 2021-10-26 PROBLEM — E11.65 TYPE 2 DIABETES MELLITUS WITH HYPERGLYCEMIA, WITHOUT LONG-TERM CURRENT USE OF INSULIN (HCC): Status: ACTIVE | Noted: 2017-08-01

## 2021-10-26 LAB
COVID-19 RAPID TEST, COVR: NOT DETECTED
GLUCOSE BLD STRIP.AUTO-MCNC: 168 MG/DL (ref 65–100)
GLUCOSE BLD STRIP.AUTO-MCNC: 196 MG/DL (ref 65–100)
GLUCOSE BLD STRIP.AUTO-MCNC: 292 MG/DL (ref 65–100)
HGB BLD-MCNC: 14.2 G/DL (ref 13.6–17.2)
POTASSIUM BLD-SCNC: 4.8 MMOL/L (ref 3.5–5.1)
SARS-COV-2, COV2: NORMAL
SERVICE CMNT-IMP: ABNORMAL
SOURCE, COVRS: NORMAL

## 2021-10-26 PROCEDURE — 76210000020 HC REC RM PH II FIRST 0.5 HR: Performed by: ORTHOPAEDIC SURGERY

## 2021-10-26 PROCEDURE — 76942 ECHO GUIDE FOR BIOPSY: CPT | Performed by: ORTHOPAEDIC SURGERY

## 2021-10-26 PROCEDURE — 0QSG04Z REPOSITION RIGHT TIBIA WITH INTERNAL FIXATION DEVICE, OPEN APPROACH: ICD-10-PCS | Performed by: ORTHOPAEDIC SURGERY

## 2021-10-26 PROCEDURE — 76210000006 HC OR PH I REC 0.5 TO 1 HR: Performed by: ORTHOPAEDIC SURGERY

## 2021-10-26 PROCEDURE — 77030000032 HC CUF TRNQT ZIMM -B: Performed by: ORTHOPAEDIC SURGERY

## 2021-10-26 PROCEDURE — 77030003602 HC NDL NRV BLK BBMI -B: Performed by: ANESTHESIOLOGY

## 2021-10-26 PROCEDURE — 74011250636 HC RX REV CODE- 250/636: Performed by: ANESTHESIOLOGY

## 2021-10-26 PROCEDURE — 74011250637 HC RX REV CODE- 250/637: Performed by: NURSE PRACTITIONER

## 2021-10-26 PROCEDURE — 74011000250 HC RX REV CODE- 250: Performed by: NURSE ANESTHETIST, CERTIFIED REGISTERED

## 2021-10-26 PROCEDURE — 85018 HEMOGLOBIN: CPT

## 2021-10-26 PROCEDURE — C1769 GUIDE WIRE: HCPCS | Performed by: ORTHOPAEDIC SURGERY

## 2021-10-26 PROCEDURE — C1713 ANCHOR/SCREW BN/BN,TIS/BN: HCPCS | Performed by: ORTHOPAEDIC SURGERY

## 2021-10-26 PROCEDURE — 77030025281 HC SPLNT ORTHGLS 1 BSNM -B: Performed by: ORTHOPAEDIC SURGERY

## 2021-10-26 PROCEDURE — 3E0T3BZ INTRODUCTION OF ANESTHETIC AGENT INTO PERIPHERAL NERVES AND PLEXI, PERCUTANEOUS APPROACH: ICD-10-PCS | Performed by: ANESTHESIOLOGY

## 2021-10-26 PROCEDURE — 2709999900 HC NON-CHARGEABLE SUPPLY: Performed by: ORTHOPAEDIC SURGERY

## 2021-10-26 PROCEDURE — 77030002916 HC SUT ETHLN J&J -A: Performed by: ORTHOPAEDIC SURGERY

## 2021-10-26 PROCEDURE — 87635 SARS-COV-2 COVID-19 AMP PRB: CPT

## 2021-10-26 PROCEDURE — 84132 ASSAY OF SERUM POTASSIUM: CPT

## 2021-10-26 PROCEDURE — 77030002933 HC SUT MCRYL J&J -A: Performed by: ORTHOPAEDIC SURGERY

## 2021-10-26 PROCEDURE — 77030040922 HC BLNKT HYPOTHRM STRY -A: Performed by: ANESTHESIOLOGY

## 2021-10-26 PROCEDURE — 74011250636 HC RX REV CODE- 250/636: Performed by: NURSE ANESTHETIST, CERTIFIED REGISTERED

## 2021-10-26 PROCEDURE — 82962 GLUCOSE BLOOD TEST: CPT

## 2021-10-26 PROCEDURE — 77030020274 HC MISC IMPL ORTHOPEDIC: Performed by: ORTHOPAEDIC SURGERY

## 2021-10-26 PROCEDURE — 0QSJ04Z REPOSITION RIGHT FIBULA WITH INTERNAL FIXATION DEVICE, OPEN APPROACH: ICD-10-PCS | Performed by: ORTHOPAEDIC SURGERY

## 2021-10-26 PROCEDURE — 27822 TREATMENT OF ANKLE FRACTURE: CPT | Performed by: NURSE PRACTITIONER

## 2021-10-26 PROCEDURE — 76010010054 HC POST OP PAIN BLOCK: Performed by: ORTHOPAEDIC SURGERY

## 2021-10-26 PROCEDURE — 65270000029 HC RM PRIVATE

## 2021-10-26 PROCEDURE — 74011636637 HC RX REV CODE- 636/637: Performed by: INTERNAL MEDICINE

## 2021-10-26 PROCEDURE — 27829 TREAT LOWER LEG JOINT: CPT | Performed by: NURSE PRACTITIONER

## 2021-10-26 PROCEDURE — 76060000032 HC ANESTHESIA 0.5 TO 1 HR: Performed by: ORTHOPAEDIC SURGERY

## 2021-10-26 PROCEDURE — 74011000250 HC RX REV CODE- 250: Performed by: NURSE PRACTITIONER

## 2021-10-26 PROCEDURE — 27822 TREATMENT OF ANKLE FRACTURE: CPT | Performed by: ORTHOPAEDIC SURGERY

## 2021-10-26 PROCEDURE — 27829 TREAT LOWER LEG JOINT: CPT | Performed by: ORTHOPAEDIC SURGERY

## 2021-10-26 PROCEDURE — 76010000160 HC OR TIME 0.5 TO 1 HR INTENSV-TIER 1: Performed by: ORTHOPAEDIC SURGERY

## 2021-10-26 PROCEDURE — 74011250636 HC RX REV CODE- 250/636: Performed by: NURSE PRACTITIONER

## 2021-10-26 PROCEDURE — 86580 TB INTRADERMAL TEST: CPT | Performed by: NURSE PRACTITIONER

## 2021-10-26 DEVICE — SCREW BNE L16MM DIA2.7MM CORT ANK S STL NONLOCKING LO PROF: Type: IMPLANTABLE DEVICE | Site: ANKLE | Status: FUNCTIONAL

## 2021-10-26 DEVICE — SCREW BNE L50MM DIA3.5MM CORT FT ANK S STL NONLOCKING LO: Type: IMPLANTABLE DEVICE | Site: ANKLE | Status: FUNCTIONAL

## 2021-10-26 DEVICE — NAIL IM 3X130 MM RT FIBULAR: Type: IMPLANTABLE DEVICE | Site: ANKLE | Status: FUNCTIONAL

## 2021-10-26 DEVICE — SCREW BNE L14MM DIA2.7MM CORT ANK S STL NONLOCKING LO PROF: Type: IMPLANTABLE DEVICE | Site: ANKLE | Status: FUNCTIONAL

## 2021-10-26 DEVICE — SCREW BNE L44MM DIA4MM S STL ST CANN LNG THRD LO PROF ANK: Type: IMPLANTABLE DEVICE | Site: ANKLE | Status: FUNCTIONAL

## 2021-10-26 RX ORDER — ENOXAPARIN SODIUM 100 MG/ML
40 INJECTION SUBCUTANEOUS EVERY 24 HOURS
Status: DISCONTINUED | OUTPATIENT
Start: 2021-10-26 | End: 2021-10-30 | Stop reason: HOSPADM

## 2021-10-26 RX ORDER — LIDOCAINE HYDROCHLORIDE 20 MG/ML
INJECTION, SOLUTION EPIDURAL; INFILTRATION; INTRACAUDAL; PERINEURAL AS NEEDED
Status: DISCONTINUED | OUTPATIENT
Start: 2021-10-26 | End: 2021-10-26 | Stop reason: HOSPADM

## 2021-10-26 RX ORDER — ROPINIROLE 0.5 MG/1
0.5 TABLET, FILM COATED ORAL
Status: DISCONTINUED | OUTPATIENT
Start: 2021-10-26 | End: 2021-10-30 | Stop reason: HOSPADM

## 2021-10-26 RX ORDER — SODIUM CHLORIDE, SODIUM LACTATE, POTASSIUM CHLORIDE, CALCIUM CHLORIDE 600; 310; 30; 20 MG/100ML; MG/100ML; MG/100ML; MG/100ML
100 INJECTION, SOLUTION INTRAVENOUS CONTINUOUS
Status: DISPENSED | OUTPATIENT
Start: 2021-10-26 | End: 2021-10-27

## 2021-10-26 RX ORDER — FENTANYL CITRATE 50 UG/ML
100 INJECTION, SOLUTION INTRAMUSCULAR; INTRAVENOUS ONCE
Status: COMPLETED | OUTPATIENT
Start: 2021-10-26 | End: 2021-10-26

## 2021-10-26 RX ORDER — HYDROMORPHONE HYDROCHLORIDE 2 MG/ML
0.5 INJECTION, SOLUTION INTRAMUSCULAR; INTRAVENOUS; SUBCUTANEOUS
Status: DISCONTINUED | OUTPATIENT
Start: 2021-10-26 | End: 2021-10-27

## 2021-10-26 RX ORDER — GABAPENTIN 300 MG/1
300 CAPSULE ORAL 3 TIMES DAILY
Status: DISCONTINUED | OUTPATIENT
Start: 2021-10-26 | End: 2021-10-30 | Stop reason: HOSPADM

## 2021-10-26 RX ORDER — PRAVASTATIN SODIUM 20 MG/1
40 TABLET ORAL
Status: DISCONTINUED | OUTPATIENT
Start: 2021-10-26 | End: 2021-10-30 | Stop reason: HOSPADM

## 2021-10-26 RX ORDER — PROPOFOL 10 MG/ML
INJECTION, EMULSION INTRAVENOUS AS NEEDED
Status: DISCONTINUED | OUTPATIENT
Start: 2021-10-26 | End: 2021-10-26 | Stop reason: HOSPADM

## 2021-10-26 RX ORDER — SODIUM CHLORIDE 0.9 % (FLUSH) 0.9 %
5-40 SYRINGE (ML) INJECTION AS NEEDED
Status: DISCONTINUED | OUTPATIENT
Start: 2021-10-26 | End: 2021-10-30 | Stop reason: HOSPADM

## 2021-10-26 RX ORDER — MIDAZOLAM HYDROCHLORIDE 1 MG/ML
2 INJECTION, SOLUTION INTRAMUSCULAR; INTRAVENOUS
Status: COMPLETED | OUTPATIENT
Start: 2021-10-26 | End: 2021-10-26

## 2021-10-26 RX ORDER — ROPIVACAINE HYDROCHLORIDE 5 MG/ML
INJECTION, SOLUTION EPIDURAL; INFILTRATION; PERINEURAL
Status: COMPLETED | OUTPATIENT
Start: 2021-10-26 | End: 2021-10-26

## 2021-10-26 RX ORDER — CEFAZOLIN SODIUM/WATER 2 G/20 ML
2 SYRINGE (ML) INTRAVENOUS ONCE
Status: COMPLETED | OUTPATIENT
Start: 2021-10-26 | End: 2021-10-26

## 2021-10-26 RX ORDER — INSULIN LISPRO 100 [IU]/ML
INJECTION, SOLUTION INTRAVENOUS; SUBCUTANEOUS
Status: DISCONTINUED | OUTPATIENT
Start: 2021-10-26 | End: 2021-10-30 | Stop reason: HOSPADM

## 2021-10-26 RX ORDER — PIOGLITAZONEHYDROCHLORIDE 15 MG/1
30 TABLET ORAL
Status: DISCONTINUED | OUTPATIENT
Start: 2021-10-27 | End: 2021-10-30 | Stop reason: HOSPADM

## 2021-10-26 RX ORDER — OXYCODONE HYDROCHLORIDE 5 MG/1
10 TABLET ORAL
Status: ACTIVE | OUTPATIENT
Start: 2021-10-26 | End: 2021-10-27

## 2021-10-26 RX ORDER — DEXTROSE 40 %
15 GEL (GRAM) ORAL AS NEEDED
Status: DISCONTINUED | OUTPATIENT
Start: 2021-10-26 | End: 2021-10-30 | Stop reason: HOSPADM

## 2021-10-26 RX ORDER — LIDOCAINE HYDROCHLORIDE 10 MG/ML
0.3 INJECTION INFILTRATION; PERINEURAL ONCE
Status: DISCONTINUED | OUTPATIENT
Start: 2021-10-26 | End: 2021-10-26 | Stop reason: HOSPADM

## 2021-10-26 RX ORDER — OXYCODONE HYDROCHLORIDE 5 MG/1
5 TABLET ORAL
Status: DISCONTINUED | OUTPATIENT
Start: 2021-10-26 | End: 2021-10-30 | Stop reason: HOSPADM

## 2021-10-26 RX ORDER — EPHEDRINE SULFATE/0.9% NACL/PF 50 MG/5 ML
SYRINGE (ML) INTRAVENOUS AS NEEDED
Status: DISCONTINUED | OUTPATIENT
Start: 2021-10-26 | End: 2021-10-26 | Stop reason: HOSPADM

## 2021-10-26 RX ORDER — SODIUM CHLORIDE 0.9 % (FLUSH) 0.9 %
5-40 SYRINGE (ML) INJECTION EVERY 8 HOURS
Status: DISCONTINUED | OUTPATIENT
Start: 2021-10-26 | End: 2021-10-26 | Stop reason: HOSPADM

## 2021-10-26 RX ORDER — SODIUM CHLORIDE 0.9 % (FLUSH) 0.9 %
5-40 SYRINGE (ML) INJECTION EVERY 8 HOURS
Status: DISCONTINUED | OUTPATIENT
Start: 2021-10-26 | End: 2021-10-30 | Stop reason: HOSPADM

## 2021-10-26 RX ORDER — ACETAMINOPHEN 325 MG/1
650 TABLET ORAL
Status: DISCONTINUED | OUTPATIENT
Start: 2021-10-26 | End: 2021-10-30 | Stop reason: HOSPADM

## 2021-10-26 RX ORDER — SODIUM CHLORIDE, SODIUM LACTATE, POTASSIUM CHLORIDE, CALCIUM CHLORIDE 600; 310; 30; 20 MG/100ML; MG/100ML; MG/100ML; MG/100ML
100 INJECTION, SOLUTION INTRAVENOUS CONTINUOUS
Status: DISPENSED | OUTPATIENT
Start: 2021-10-26 | End: 2021-10-26

## 2021-10-26 RX ORDER — HYDROMORPHONE HYDROCHLORIDE 1 MG/ML
1 INJECTION, SOLUTION INTRAMUSCULAR; INTRAVENOUS; SUBCUTANEOUS
Status: DISCONTINUED | OUTPATIENT
Start: 2021-10-26 | End: 2021-10-30 | Stop reason: HOSPADM

## 2021-10-26 RX ORDER — DEXTROSE 50 % IN WATER (D50W) INTRAVENOUS SYRINGE
25-50 AS NEEDED
Status: DISCONTINUED | OUTPATIENT
Start: 2021-10-26 | End: 2021-10-30 | Stop reason: HOSPADM

## 2021-10-26 RX ORDER — METFORMIN HYDROCHLORIDE 500 MG/1
1000 TABLET, EXTENDED RELEASE ORAL
Status: DISCONTINUED | OUTPATIENT
Start: 2021-10-26 | End: 2021-10-30 | Stop reason: HOSPADM

## 2021-10-26 RX ORDER — LISINOPRIL AND HYDROCHLOROTHIAZIDE 12.5; 2 MG/1; MG/1
1 TABLET ORAL DAILY
Status: DISCONTINUED | OUTPATIENT
Start: 2021-10-27 | End: 2021-10-30 | Stop reason: HOSPADM

## 2021-10-26 RX ORDER — PROPOFOL 10 MG/ML
INJECTION, EMULSION INTRAVENOUS
Status: DISCONTINUED | OUTPATIENT
Start: 2021-10-26 | End: 2021-10-26 | Stop reason: HOSPADM

## 2021-10-26 RX ADMIN — PROPOFOL 30 MG: 10 INJECTION, EMULSION INTRAVENOUS at 11:06

## 2021-10-26 RX ADMIN — ROPIVACAINE HYDROCHLORIDE 10 ML: 5 INJECTION, SOLUTION EPIDURAL; INFILTRATION; PERINEURAL at 10:02

## 2021-10-26 RX ADMIN — CEFAZOLIN 2 G: 1 INJECTION, POWDER, FOR SOLUTION INTRAVENOUS at 10:54

## 2021-10-26 RX ADMIN — MEPIVACAINE HYDROCHLORIDE 10 ML: 15 INJECTION, SOLUTION EPIDURAL; INFILTRATION at 10:02

## 2021-10-26 RX ADMIN — ROPIVACAINE HYDROCHLORIDE 18 ML: 5 INJECTION, SOLUTION EPIDURAL; INFILTRATION; PERINEURAL at 10:00

## 2021-10-26 RX ADMIN — INSULIN LISPRO 6 UNITS: 100 INJECTION, SOLUTION INTRAVENOUS; SUBCUTANEOUS at 17:30

## 2021-10-26 RX ADMIN — MIDAZOLAM 1 MG: 1 INJECTION INTRAMUSCULAR; INTRAVENOUS at 09:55

## 2021-10-26 RX ADMIN — FENTANYL CITRATE 100 MCG: 50 INJECTION INTRAMUSCULAR; INTRAVENOUS at 09:55

## 2021-10-26 RX ADMIN — Medication 5 MG: at 11:33

## 2021-10-26 RX ADMIN — Medication 10 ML: at 22:11

## 2021-10-26 RX ADMIN — OXYCODONE 5 MG: 5 TABLET ORAL at 19:32

## 2021-10-26 RX ADMIN — ENOXAPARIN SODIUM 40 MG: 40 INJECTION SUBCUTANEOUS at 22:10

## 2021-10-26 RX ADMIN — TUBERCULIN PURIFIED PROTEIN DERIVATIVE 5 UNITS: 5 INJECTION, SOLUTION INTRADERMAL at 15:12

## 2021-10-26 RX ADMIN — HYDROMORPHONE HYDROCHLORIDE 1 MG: 1 INJECTION, SOLUTION INTRAMUSCULAR; INTRAVENOUS; SUBCUTANEOUS at 17:29

## 2021-10-26 RX ADMIN — Medication 5 ML: at 15:13

## 2021-10-26 RX ADMIN — SODIUM CHLORIDE, SODIUM LACTATE, POTASSIUM CHLORIDE, AND CALCIUM CHLORIDE 100 ML/HR: 600; 310; 30; 20 INJECTION, SOLUTION INTRAVENOUS at 09:00

## 2021-10-26 RX ADMIN — ROPINIROLE HYDROCHLORIDE 0.5 MG: 0.5 TABLET, FILM COATED ORAL at 22:10

## 2021-10-26 RX ADMIN — HYDROMORPHONE HYDROCHLORIDE 1 MG: 1 INJECTION, SOLUTION INTRAMUSCULAR; INTRAVENOUS; SUBCUTANEOUS at 22:16

## 2021-10-26 RX ADMIN — METFORMIN HYDROCHLORIDE 1000 MG: 500 TABLET, EXTENDED RELEASE ORAL at 17:30

## 2021-10-26 RX ADMIN — LIDOCAINE HYDROCHLORIDE 20 MG: 20 INJECTION, SOLUTION EPIDURAL; INFILTRATION; INTRACAUDAL; PERINEURAL at 11:06

## 2021-10-26 RX ADMIN — GABAPENTIN 300 MG: 300 CAPSULE ORAL at 22:10

## 2021-10-26 RX ADMIN — GABAPENTIN 300 MG: 300 CAPSULE ORAL at 17:30

## 2021-10-26 RX ADMIN — PROPOFOL 180 MCG/KG/MIN: 10 INJECTION, EMULSION INTRAVENOUS at 11:09

## 2021-10-26 RX ADMIN — PRAVASTATIN SODIUM 40 MG: 20 TABLET ORAL at 22:10

## 2021-10-26 RX ADMIN — INSULIN LISPRO 2 UNITS: 100 INJECTION, SOLUTION INTRAVENOUS; SUBCUTANEOUS at 22:10

## 2021-10-26 NOTE — PERIOP NOTES
TRANSFER - OUT REPORT:    Verbal report given to Aliya Morris RN on Rakesh Lima  being transferred to 7th floor for routine progression of care       Report consisted of patients Situation, Background, Assessment and   Recommendations(SBAR). Information from the following report(s) OR Summary, Procedure Summary, Intake/Output, MAR, Accordion, Recent Results, Med Rec Status and Cardiac Rhythm NS was reviewed with the receiving nurse. Lines:   Peripheral IV 10/26/21 Left Hand (Active)   Site Assessment Clean, dry, & intact 10/26/21 1158   Phlebitis Assessment 0 10/26/21 1158   Infiltration Assessment 0 10/26/21 1158   Dressing Status Clean, dry, & intact 10/26/21 1158   Dressing Type Transparent;Tape 10/26/21 1158   Hub Color/Line Status Patent 10/26/21 1158        Opportunity for questions and clarification was provided. Patient transported with:         VTE prophylaxis orders have been written for Rakesh Lima. Patient and family given floor number and nurses name. Family updated re: pt status after security code verified.

## 2021-10-26 NOTE — OP NOTES
FULL OP NOTE    PATIENT NAME: Jorge Goddard  MRN: [id number]    DATE OF SURGERY: 10/26/2021    PREOPERATIVE DIAGNOSIS: Closed trimalleolar fracture of right ankle, initial encounter [S82.771S]      POSTOPERATIVE DIAGNOSIS: Closed trimalleolar fracture of right ankle, initial encounter [S82.851A]      PROCEDURE: 1. Open reduction internal fixation of right trimalleolar ankle fracture without fixation posterior lip, 30512                             2. Open reduction internal fixation of right syndesmosis injury, 25508    SURGEON: Aleja Tubbs MD    ASSISTANT: Roby Pederson NP  An assistant was required for positioning, retraction, and wound closure for this procedure. HARDWARE:   Implant Name Type Inv. Item Serial No.  Lot No. LRB No. Used Action   Arthrex fibulock nail    [employer] [id number] Right 1 Implanted     INDICATIONS: This patient is a 80y.o. year old male with a history of Closed trimalleolar fracture of right ankle, initial encounter Neena Colin who has failed conservative therapy and desires surgical treatment. Risks and benefits of the procedure including, but not limited to, anesthetic complications as well as surgical complications including damage to nerves and blood vessels, risk of infection, risk of incomplete pain relief, risk of malunion, nonunion and need for additional surgery have been discussed with the patient who wishes to proceed. PROCEDURE IN DETAIL: A time out was done to confirm the operating procedure, surgeon, patient and site. A block was placed by the department of anesthesia. During a preop surgical timeout the right lower extremity was identified as the correct surgical site and prepped and draped in the standard sterile fashion using ChloraPrep solution. The fracture was reduced in anatomic alignment which was confirmed on fluoroscopic imaging. A small incision was made just inferior to the fibular tip.  Blunt dissection was carried down to the fibular tip. A guidewire was then placed inside the intramedullary canal of the fibula. An entry reamer was introduced. At that point an Arthrex fibular nail was then advanced across the fracture held in anatomic alignment. The proximal talons were then released to create rotational control of the fracture. 2 distal interlocking screws were placed. To address the patient syndesmosis injury a syndesmosis screw was then placed fluoroscopically through the nail into the distal tibia and confirmed to be adequately placed on image intensification. A medial approach the medial malleolus fracture was an open at that time. The fracture was identified and was reduced in anatomic alignment using a dental pick. 2 guidewires for the 4 mm screws were placed across the fracture noted be adequate placement intensification. Two 4 mm Screws Were Placed across the Fracture However the Guidewires without Difficulty. The Wounds Were Then Irrigated and Closed Using Monocryl Nylon Sutures. Sterile Dressings and Applied Followed by Well-Padded Posterior Splint. Anesthesia Was Discontinued. The Patient Was Transferred Back to Recovery Bed. He Was Taken to Recovery in Satisfactory Condition. He Appeared to Tolerate the Procedure Well. There Were No Apparent Trouble or Anesthetic Complications. All Needle and Sponge Counts Were Correct. Postoperatively Is Nonweightbearing in a Splint. Replaced in the hospital and placed on Lovenox for DVT prophylaxis. TOURNIQUET TIME: Approx 35 minutes. SPECIMENS: none    ESTIMATED BLOOD LOSS: min mL.

## 2021-10-26 NOTE — CONSULTS
[facility] Hospitalist Consult   Admit Date:  10/26/2021  7:29 AM   Name:  Kylie Carty   Age:  80 y.o. Sex:  male  :  1940   MRN:  [id number]   Room:  [facility]/    Presenting Complaint: No chief complaint on file. Reason(s) for Admission: Closed trimalleolar fracture of right ankle, initial encounter [V92.322M]  Ankle fracture, right [J31.446Y]     Hospitalists consulted by Pedro Garay MD for: diabetes    History of Presenting Illness:   Kylie Carty is a 80 y.o. male with history of DM, HTN, HLD who was admitted for R ankle ORIF by ortho service. Postoperatively hospitalists were consulted for diabetes management. Pt says BG usually ranges in 120s-150s at home. He does not take insulin. He is mostly compliant with diet but \"cheats\" sometimes. Denies complications of DM other than neuropathy. No skin wounds, vision issues. No pain, SOB, fevers    Review of Systems:  10 systems reviewed and negative except as noted in HPI. Assessment & Plan:       Type 2 diabetes mellitus with hyperglycemia    -start ISS, ADA diet.    -check a1c    -ok to continue home meds for now.   If standing insulin needed, would hold PO meds       Diabetic neuropathy    -home gabapentin       Ankle fracture, right     -s/p ORIF    -activity per ortho      Acquired hypothyroidism     -cont home synthroid      Hypertension, essential, benign     - Controlled without meds currently      HLD    -home pravastatin      Hospital Problems as of 10/26/2021 Date Reviewed: 3/2/2021        Codes Class Noted - Resolved POA    Type 2 diabetes mellitus with hyperglycemia, without long-term current use of insulin (HCC) ICD-10-CM: E11.65  ICD-9-CM: 250.00, 790.29  10/26/2021 - Present Yes        * (Principal) Ankle fracture, right ICD-10-CM: I91.567K  ICD-9-CM: 824.8  10/26/2021 - Present Yes        Acquired hypothyroidism (Chronic) ICD-10-CM: E03.9  ICD-9-CM: 244.9  10/17/2016 - Present Yes        Hypertension, essential, benign (Chronic) ICD-10-CM: I10  ICD-9-CM: 401.1  10/17/2016 - Present Yes              Past Medical History:   Diagnosis Date    Abnormal clinical finding 10/17/2016    Allergic rhinitis     Anemia 10/17/2016    Arteriosclerosis of carotid artery 10/17/2016    Arthritis     hands    BPH (benign prostatic hyperplasia) 10/17/2016    Dermatophytosis of nail     Diabetes ([address]     Diabetic neuropathy, painful ([address] 10/17/2016    Disorder of fluid or electrolyte 10/17/2016    Dizziness and giddiness 10/17/2016    dizziness, ataxia 5/2021 ER visit    DM (diabetes mellitus), type 2, uncontrolled ([address] 10/17/2016    oral agents, -150, S&S of hypoglycemia BS 45    Dyslipidemia 10/17/2016    GERD (gastroesophageal reflux disease) 10/17/2016    Hematoma, subungual, great toe, left 10/17/2016    Hip tendinitis 10/17/2016    Hypercholesterolemia 10/17/2016    Hypertension     Hypertension, essential, benign 10/17/2016    controlled with med    Hypomagnesemia     Joint derangement 10/17/2016    Lumbar stenosis with neurogenic claudication 10/17/2016    Malaise and fatigue 10/17/2016    Mononeuritis of lower extremity 10/17/2016    Muscle spasm of back 10/17/2016    Neuropathy     Nontoxic uninodular goiter 10/17/2016    PAD (peripheral artery disease) ([address] 10/17/2016    Pain in limb 10/17/2016    Pain, foot     Peripheral neuropathy 10/17/2016    PLMD (periodic limb movement disorder) 10/17/2016    Polyarthritis/Polyarthropathy 10/17/2016    Radiculopathy, lumbosacral region 10/17/2016    RLS (restless legs syndrome) 10/17/2016    Skin cancer     face, head    sleep apnea     wears cpap \" most \" of the time    Thromboembolus ([address]     blood clot in both legs 1987 ,2002    Thyroid disease     hypo    Weakness       Past Surgical History:   Procedure Laterality Date    COLONOSCOPY N/A 11/29/2017    COLONOSCOPY / BMI=27 performed by Matthew Montesinos MD at [facility] HX APPENDECTOMY      HX CHOLECYSTECTOMY      HX COLONOSCOPY  11/29/2017    HX HEENT      right ear surgery-to improve hearing    HX OTHER SURGICAL      colonscopy     MT TOTAL KNEE ARTHROPLASTY  2002    right and left      No Known Allergies   Social History     Tobacco Use    Smoking status: Never Smoker    Smokeless tobacco: Never Used   Substance Use Topics    Alcohol use: No      Family History   Problem Relation Age of Onset    Diabetes Mother     Cancer Father       Family history reviewed and negative unless otherwise noted above.   Immunization History   Administered Date(s) Administered    COVID-19, PFIZER, MRNA, LNP-S, PF, 30MCG/0.3ML DOSE 02/12/2021    Influenza Vaccine 11/11/2013, 11/06/2015    Influenza Vaccine (Quad) Mdck Pf (>2 Yrs Flucelvax QUAD [id number]) 10/06/2017    Influenza Vaccine (Quad) PF (>6 Mo Flulaval, Fluarix, and >3 Yrs Afluria, Fluzone 08090) 10/29/2018    Pneumococcal Conjugate (PCV-13) 04/03/2015    Pneumococcal Polysaccharide (PPSV-23) 09/26/2008    TB Skin Test (PPD) Intradermal 10/26/2021    Td 05/24/2012    Tdap 07/25/2018       Objective:     Patient Vitals for the past 24 hrs:   Temp Pulse Resp BP SpO2   10/26/21 1313 97.5 °F (36.4 °C) 68 17 130/70 97 %   10/26/21 1231  66 15 123/66 95 %   10/26/21 1226 98 °F (36.7 °C) 69 17 121/61 95 %   10/26/21 1220  68 19 114/63 93 %   10/26/21 1215  68 14 111/65 93 %   10/26/21 1210  67 15 110/61 98 %   10/26/21 1158 97.3 °F (36.3 °C) 72 16 107/63 98 %   10/26/21 1157  73  107/63 98 %   10/26/21 1032  62 18 (!) 107/57 99 %   10/26/21 1017  64 18 (!) 97/53 99 %   10/26/21 1012  66 18 (!) 101/57 99 %   10/26/21 1007  64 18 (!) 102/59 99 %   10/26/21 1002  69 18 (!) 102/55 99 %   10/26/21 0851 97.9 °F (36.6 °C) 71 15 137/65 96 %     Oxygen Therapy  O2 Sat (%): 97 % (10/26/21 1313)  Pulse via Oximetry: 67 beats per minute (10/26/21 1231)  O2 Device: None (Room air) (10/26/21 1313)  O2 Flow Rate (L/min): 3 l/min (10/26/21 1158)    Estimated body mass index is 30.68 kg/m² as calculated from the following:    Height as of this encounter: 5' 11\" (1.803 m). Weight as of this encounter: 99.8 kg (220 lb). Intake/Output Summary (Last 24 hours) at 10/26/2021 1536  Last data filed at 10/26/2021 1151  Gross per 24 hour   Intake 450 ml   Output 2 ml   Net 448 ml         Physical Exam:  Blood pressure 130/70, pulse 68, temperature 97.5 °F (36.4 °C), resp. rate 17, height 5' 11\" (1.803 m), weight 99.8 kg (220 lb), SpO2 97 %. General:    Well nourished. No overt distress  Head:  Normocephalic, atraumatic  Eyes:  Sclerae appear normal.  Pupils equally round. ENT:  Nares appear normal, no drainage. Moist oral mucosa  Neck:  No restricted ROM. Trachea midline   CV:   RRR. No jugular venous distension. Lungs:   Respirations even, unlabored  Abdomen:   nondistended. Extremities: No cyanosis or clubbing. No edema  Skin:     No rashes and normal coloration. Warm and dry. Neuro:  CN II-XII grossly intact. Sensation intact. A&Ox3  Psych:  Normal mood and affect.       I have reviewed ordered lab tests and independently visualized imaging below:    Recent Labs:  Recent Results (from the past 48 hour(s))   POC SODIUM-POTASSIUM    Collection Time: 10/26/21  8:59 AM   Result Value Ref Range    Potassium, POC 4.8 3.5 - 5.1 MMOL/L   GLUCOSE, POC    Collection Time: 10/26/21  8:59 AM   Result Value Ref Range    Glucose (POC) 168 (H) 65 - 100 mg/dL    Performed by Miguel A    HEMOGLOBIN    Collection Time: 10/26/21  9:01 AM   Result Value Ref Range    HGB 14.2 13.6 - 17.2 g/dL   SARS-COV-2    Collection Time: 10/26/21  9:44 AM   Result Value Ref Range    SARS-CoV-2 NASAL     COVID-19 RAPID TEST    Collection Time: 10/26/21  9:44 AM   Result Value Ref Range    Specimen source NASAL      COVID-19 rapid test Not detected NOTD         All Micro Results     Procedure Component Value Units Date/Time    COVID-19 RAPID TEST [[id number]] Collected: 10/26/21 0944    Order Status: Completed Specimen: Nasopharyngeal Updated: 10/26/21 1048     Specimen source NASAL        Comment: RAPID ONLY        COVID-19 rapid test Not detected        Comment:      The specimen is NEGATIVE for SARS-CoV-2, the novel coronavirus associated with COVID-19. A negative result does not rule out COVID-19. This test has been authorized by the FDA under an Emergency Use Authorization (EUA) for use by authorized laboratories. Fact sheet for Healthcare Providers: elastic.ioco.nz  Fact sheet for Patients: "[employer].co.nz       Methodology: Isothermal Nucleic Acid Amplification               Other Studies:  NC XR TECHNOLOGIST SERVICE    Result Date: 10/26/2021  Administrative Report Fluoroscopy was provided in the operating room. Images may have been saved as a reference for the surgical procedure. The operative report can be viewed in [facility] and/or retrieved by the Medical Records department.      A Radiologist has not reviewed images associated with this exam.      Current Meds:  Current Facility-Administered Medications   Medication Dose Route Frequency    sodium chloride (NS) flush 5-40 mL  5-40 mL IntraVENous PRN    lactated Ringers infusion  100 mL/hr IntraVENous CONTINUOUS    oxyCODONE IR (ROXICODONE) tablet 10 mg  10 mg Oral ONCE PRN    HYDROmorphone (DILAUDID) injection 0.5 mg  0.5 mg IntraVENous Multiple    promethazine (PHENERGAN) with saline injection 6.25 mg  6.25 mg IntraVENous Q15MIN PRN    [START ON 10/27/2021] empagliflozin (JARDIANCE) tablet 25 mg (Patient Supplied)  25 mg Oral DAILY    [START ON 10/27/2021] multivitamin, tx-iron-ca-min (THERA-M w/ IRON) tablet 1 Tablet  1 Tablet Oral DAILY    gabapentin (NEURONTIN) capsule 300 mg  300 mg Oral TID    [START ON 10/27/2021] levothyroxine (SYNTHROID) tablet 125 mcg  125 mcg Oral ACB    [START ON 10/27/2021] lisinopril-hydroCHLOROthiazide (PRINZIDE, ZESTORETIC) 20-12.5 mg per tablet 1 Tablet  1 Tablet Oral DAILY    metFORMIN ER (GLUCOPHAGE XR) tablet 1,000 mg  1,000 mg Oral ACB&D    [START ON 10/27/2021] pioglitazone (ACTOS) tablet 30 mg  30 mg Oral 7am    pravastatin (PRAVACHOL) tablet 40 mg  40 mg Oral QHS    rOPINIRole (REQUIP) tablet 0.5 mg  0.5 mg Oral QHS    sodium chloride (NS) flush 5-40 mL  5-40 mL IntraVENous Q8H    sodium chloride (NS) flush 5-40 mL  5-40 mL IntraVENous PRN    acetaminophen (TYLENOL) tablet 650 mg  650 mg Oral Q4H PRN    oxyCODONE IR (ROXICODONE) tablet 5 mg  5 mg Oral Q4H PRN    HYDROmorphone (DILAUDID) injection 1 mg  1 mg IntraVENous Q4H PRN    enoxaparin (LOVENOX) injection 40 mg  40 mg SubCUTAneous Q24H    tuberculin injection 5 Units  5 Units IntraDERMal ONCE    insulin lispro (HUMALOG) injection   SubCUTAneous AC&HS    dextrose 40% (GLUTOSE) oral gel 1 Tube  15 g Oral PRN    glucagon (GLUCAGEN) injection 1 mg  1 mg IntraMUSCular PRN    dextrose (D50W) injection syrg 12.5-25 g  25-50 mL IntraVENous PRN       Signed:  Pauline Melara MD    Part of this note may have been written by using a voice dictation software. The note has been proof read but may still contain some grammatical/other typographical errors.

## 2021-10-26 NOTE — PROGRESS NOTES
PT Note:  PT orders received/reviewed and evaluation attempted. Patient refused due to fatigue today. Evaluation will be re-attempted as schedule and patient's status allow.   Thanks,  Aby Garcia, PT, DPT

## 2021-10-26 NOTE — PROGRESS NOTES
10/26/21 1256   Dual Skin Pressure Injury Assessment   Dual Skin Pressure Injury Assessment WDL   Second Care Provider (Based on [address] [place] [address]) Daisy Herrmann RN   Skin Integumentary   Skin Integumentary (WDL) X   Skin Integrity Incision (comment); Abrasion   Wound Prevention and Protection Methods   Orientation of Wound Prevention Posterior   Location of Wound Prevention Sacrum/Coccyx   Wound Offloading (Prevention Methods) Bed, pressure reduction mattress

## 2021-10-26 NOTE — ANESTHESIA PREPROCEDURE EVALUATION
Relevant Problems   RESPIRATORY SYSTEM   (+) HERNÁN on CPAP      CARDIOVASCULAR   (+) Hypertension, essential, benign      GASTROINTESTINAL   (+) GERD (gastroesophageal reflux disease)      ENDOCRINE   (+) Adult hypothyroidism   (+) Arthritis   (+) Uncontrolled type 2 diabetes mellitus without complication, without long-term current use of insulin      HEMATOLOGY   (+) Anemia   (+) Leukemia (HCC)      PERSONAL HX & FAMILY HX OF CANCER   (+) Leukemia (HCC)       Anesthetic History   No history of anesthetic complications            Review of Systems / Medical History  Patient summary reviewed and pertinent labs reviewed    Pulmonary        Sleep apnea: CPAP           Neuro/Psych              Cardiovascular    Hypertension          CAD    Exercise tolerance: >4 METS     GI/Hepatic/Renal     GERD: well controlled           Endo/Other    Diabetes (normally 150s, feels hypoglycemic in the 70s): type 2  Hypothyroidism  Arthritis     Other Findings              Physical Exam    Airway  Mallampati: II  TM Distance: 4 - 6 cm  Neck ROM: normal range of motion   Mouth opening: Normal     Cardiovascular    Rhythm: regular  Rate: normal         Dental    Dentition: Full lower dentures and Full upper dentures     Pulmonary  Breath sounds clear to auscultation               Abdominal         Other Findings            Anesthetic Plan    ASA: 3  Anesthesia type: total IV anesthesia      Post-op pain plan if not by surgeon: peripheral nerve block single    Induction: Intravenous  Anesthetic plan and risks discussed with: Patient and Son / Daughter

## 2021-10-26 NOTE — PROGRESS NOTES
's pre-procedure visit and prayer with patient as requested.     Jade Munguia MDiv, BS  Board Certified

## 2021-10-26 NOTE — BRIEF OP NOTE
Brief Postoperative Note    Patient: Angela Brewer  YOB: 1940  MRN: [id number]    Date of Procedure: 10/26/2021     Pre-Op Diagnosis: Closed trimalleolar fracture of right ankle, initial encounter [S84.092I]    Post-Op Diagnosis: Same as preoperative diagnosis. Procedure(s):  RIGHT ANKLE OPEN REDUCTION INTERNAL FIXATION WITH POSS SYNDESMOTIC SCREW CHOICE ANES    Surgeon(s):  Laverne Ghosh MD    Surgical Assistant: None    Anesthesia: General     Estimated Blood Loss (mL): Minimal    Complications: None    Specimens: * No specimens in log *     Implants:   Implant Name Type Inv.  Item Serial No.  Lot No. LRB No. Used Action   Arthrex fibulock nail    [address] [id number] Right 1 Implanted       Drains: * No LDAs found *    Findings:     Electronically Signed by Roman Leal MD on 10/26/2021 at 11:43 AM

## 2021-10-26 NOTE — H&P
Outpatient Surgery History and Physical:  Bruce Fierro was seen and examined. CHIEF COMPLAINT:   right ankle. PE:   There were no vitals taken for this visit. Heart:   Regular rhythm      Lungs:  Are clear      Past Medical History:    Patient Active Problem List    Diagnosis    Hypersomnia    Obesity (BMI 30.0-34. 9)    Nocturia    Precordial pain    Hyperlipidemia due to type 2 diabetes mellitus ([address]    Uncontrolled type 2 diabetes mellitus without complication, without long-term current use of insulin    Onychomycosis    Disorder of fluid or electrolyte    Arthritis    Nontoxic uninodular goiter    Polyarthritis/Polyarthropathy    Malaise and fatigue    Pain in limb    Anemia    DM (diabetes mellitus), type 2, uncontrolled (HCC)    BPH (benign prostatic hyperplasia)    Hypercholesterolemia    Blood clotting disorder (HCC)    GERD (gastroesophageal reflux disease)    Adult hypothyroidism    Abnormal clinical finding    Hypertension, essential, benign    Hyperglyceridemia, pure    Dizziness and giddiness    RLS (restless legs syndrome)    HERNÁN on CPAP    Diabetic neuropathy, painful (HCC)    Hip tendinitis    Lumbar stenosis with neurogenic claudication    PLMD (periodic limb movement disorder)    Muscle spasm of back    Arteriosclerosis of carotid artery    Radiculopathy, lumbosacral region    Peripheral neuropathy    Hematoma, subungual, great toe, left    Dyslipidemia    Mononeuritis of lower extremity    Joint derangement    Leukemia [facility])       Surgical History:   Past Surgical History:   Procedure Laterality Date    COLONOSCOPY N/A 11/29/2017    COLONOSCOPY / BMI=27 performed by Janell [facility]oney MD at [facility]    HX APPENDECTOMY      HX CHOLECYSTECTOMY      HX COLONOSCOPY  11/29/2017    HX HEENT      right ear surgery-to improve hearing    HX OTHER SURGICAL      colonscopy     PA TOTAL KNEE ARTHROPLASTY  2002    right and left       Social History: Patient  reports that he has never smoked. He has never used smokeless tobacco. He reports that he does not drink alcohol and does not use drugs. Family History:   Family History   Problem Relation Age of Onset    Diabetes Mother     Cancer Father        Allergies: Reviewed per EMR  No Known Allergies    Medications:    No current facility-administered medications on file prior to encounter. Current Outpatient Medications on File Prior to Encounter   Medication Sig    HYDROcodone-acetaminophen (Norco) 5-325 mg per tablet Take 1 Tablet by mouth every six (6) hours as needed for Pain for up to 3 days. Max Daily Amount: 4 Tablets.  cholecalciferol (VITAMIN D3) (2,000 UNITS /50 MCG) cap capsule Take  by mouth every other day.  empagliflozin (Jardiance) 25 mg tablet Take 25 mg by mouth every other day. Last dose 64/25/87    folic acid/multivit-min/lutein (CENTRUM SILVER PO) Take  by mouth daily.  pravastatin (PRAVACHOL) 40 mg tablet TAKE 1 TABLET BY MOUTH  NIGHTLY FOR HYPERLIPIDEMIA    metFORMIN ER (GLUCOPHAGE XR) 500 mg tablet TAKE 4 TABLETS BY MOUTH  DAILY (Patient taking differently: 1,000 mg Before breakfast and dinner.)    gabapentin (NEURONTIN) 300 mg capsule TAKE 1 CAPSULE BY MOUTH  DAILY AT BEDTIME    rOPINIRole (REQUIP) 0.5 mg tablet TAKE 1 TABLET BY MOUTH  TWICE A DAY (Patient taking differently: 0.5 mg nightly. TAKE 1 TABLET BY MOUTH  TWICE A DAY)    lisinopril-hydroCHLOROthiazide (PRINZIDE, ZESTORETIC) 20-12.5 mg per tablet TAKE 1 TABLET BY MOUTH  DAILY    levothyroxine (SYNTHROID) 125 mcg tablet Take  by mouth Daily (before breakfast).  pioglitazone (ACTOS) 30 mg tablet TAKE 1 TABLET BY MOUTH  DAILY    ascorbic acid, vitamin C, (VITAMIN C) 500 mg tablet Take  by mouth daily.  omega-3 fatty acids-vitamin e (FISH OIL) 1,000 mg cap Take 1 Cap by mouth daily.  Stopped 11/28/17       The surgery is planned for the RIGHT ANKLE OPEN REDUCTION INTERNAL FIXATION WITH POSS SYNDESMOTIC SCREW   . History and physical has been reviewed. The patient has been examined. There have been no significant clinical changes since the completion of the originally dated History and Physical.  Patient identified by surgeon; surgical site was confirmed by patient and surgeon. The patient is here today for outpatient surgery. I have examined the patient, no changes are noted in the patient's medical status. Necessity for the procedure/care is still present and the history and physical above is current. See the office notes for the full long term history of the problem. Please see the recent office notes for the musculoskeletal examination.     Signed By: Willem Liriano NP     October 26, 2021 7:52 AM

## 2021-10-26 NOTE — ANESTHESIA PROCEDURE NOTES
Popliteal block with ultrasound    Start time: 10/26/2021 9:55 AM  End time: 10/26/2021 10:00 AM  Performed by: Medhat Aldana MD  Authorized by: Medhat Aldana MD       Pre-procedure:    Indications: at surgeon's request and post-op pain management    Preanesthetic Checklist: patient identified, risks and benefits discussed, site marked, timeout performed, anesthesia consent given and patient being monitored    Timeout Time: 09:55 EDT          Block Type:   Block Type:  Popliteal  Laterality:  Right  Monitoring:  Continuous pulse ox, frequent vital sign checks, heart rate, oxygen and responsive to questions  Injection Technique:  Single shot  Procedures: ultrasound guided    Prep: chlorhexidine    Location:  Lower thigh  Needle Type:  Stimuplex  Needle Gauge:  20 G  Needle Localization:  Ultrasound guidance  Medication Injected:  Ropivacaine (PF) (NAROPIN)(0.5%) 5 mg/mL injection, 18 mL  mepivacaine PF (CARBOCAINE) 1.5 % injection, 18 mL  Med Admin Time: 10/26/2021 10:00 AM    Assessment:  Number of attempts:  1  Injection Assessment:  Incremental injection every 5 mL, local visualized surrounding nerve on ultrasound, negative aspiration for blood, no intravascular symptoms, no paresthesia and ultrasound image on chart  Patient tolerance:  Patient tolerated the procedure well with no immediate complications  Of note the tibial component of his sciatic nerve had a hypoechoic (almost fluid filled sac) portion within the nerve sheath, and his sciatic nerve branched quite proximally

## 2021-10-26 NOTE — ANESTHESIA POSTPROCEDURE EVALUATION
Procedure(s):  RIGHT ANKLE OPEN REDUCTION INTERNAL FIXATION WITH POSS SYNDESMOTIC SCREW CHOICE ANES.     total IV anesthesia    Anesthesia Post Evaluation      Multimodal analgesia: multimodal analgesia used between 6 hours prior to anesthesia start to PACU discharge  Patient location during evaluation: PACU  Patient participation: complete - patient participated  Level of consciousness: awake  Pain management: adequate  Airway patency: patent  Anesthetic complications: no  Cardiovascular status: acceptable  Respiratory status: acceptable  Hydration status: acceptable  Post anesthesia nausea and vomiting:  none      INITIAL Post-op Vital signs:   Vitals Value Taken Time   /61 10/26/21 1226   Temp 36.7 °C (98 °F) 10/26/21 1226   Pulse 69 10/26/21 1226   Resp 17 10/26/21 1226   SpO2 95 % 10/26/21 1226

## 2021-10-26 NOTE — PROGRESS NOTES
TRANSFER - IN REPORT:    Verbal report received from Yessy PAUL(name) on Jorge Chris  being received from [facility]) for routine post - op      Report consisted of patients Situation, Background, Assessment and   Recommendations(SBAR). Information from the following report(s) SBAR, Kardex, Procedure Summary, Intake/Output, MAR and Recent Results was reviewed with the receiving nurse. Opportunity for questions and clarification was provided. Assessment completed upon patients arrival to unit and care assumed.

## 2021-10-26 NOTE — ANESTHESIA PROCEDURE NOTES
Adductor canal block with ultrasound    Start time: 10/26/2021 10:00 AM  End time: 10/26/2021 10:02 AM  Performed by: Pranay Reddy MD  Authorized by: Pranay Reddy MD       Pre-procedure: Indications: at surgeon's request and post-op pain management    Preanesthetic Checklist: patient identified, risks and benefits discussed, site marked, timeout performed, anesthesia consent given and patient being monitored    Timeout Time: 10:00 EDT          Block Type:   Block Type:   Adductor canal  Laterality:  Right  Monitoring:  Continuous pulse ox, frequent vital sign checks, heart rate, oxygen and responsive to questions  Injection Technique:  Single shot  Procedures: ultrasound guided    Patient Position: supine  Prep: chlorhexidine    Location:  Mid thigh  Needle Gauge:  20 G  Needle Localization:  Ultrasound guidance  Medication Injected:  Mepivacaine PF (CARBOCAINE) 1.5 % injection, 10 mL  ropivacaine (PF) (NAROPIN)(0.5%) 5 mg/mL injection, 10 mL  Med Admin Time: 10/26/2021 10:02 AM    Assessment:  Number of attempts:  1  Injection Assessment:  Incremental injection every 5 mL, local visualized surrounding nerve on ultrasound, negative aspiration for blood, no intravascular symptoms, no paresthesia and ultrasound image on chart  Patient tolerance:  Patient tolerated the procedure well with no immediate complications

## 2021-10-27 LAB
ANION GAP SERPL CALC-SCNC: 5 MMOL/L (ref 7–16)
BASOPHILS # BLD: 0 K/UL (ref 0–0.2)
BASOPHILS NFR BLD: 1 % (ref 0–2)
BUN SERPL-MCNC: 35 MG/DL (ref 8–23)
CALCIUM SERPL-MCNC: 8.9 MG/DL (ref 8.3–10.4)
CHLORIDE SERPL-SCNC: 103 MMOL/L (ref 98–107)
CO2 SERPL-SCNC: 27 MMOL/L (ref 21–32)
CREAT SERPL-MCNC: 1.19 MG/DL (ref 0.8–1.5)
DIFFERENTIAL METHOD BLD: ABNORMAL
EOSINOPHIL # BLD: 0.2 K/UL (ref 0–0.8)
EOSINOPHIL NFR BLD: 3 % (ref 0.5–7.8)
ERYTHROCYTE [DISTWIDTH] IN BLOOD BY AUTOMATED COUNT: 13.9 % (ref 11.9–14.6)
EST. AVERAGE GLUCOSE BLD GHB EST-MCNC: 163 MG/DL
GLUCOSE BLD STRIP.AUTO-MCNC: 169 MG/DL (ref 65–100)
GLUCOSE BLD STRIP.AUTO-MCNC: 203 MG/DL (ref 65–100)
GLUCOSE BLD STRIP.AUTO-MCNC: 237 MG/DL (ref 65–100)
GLUCOSE BLD STRIP.AUTO-MCNC: 250 MG/DL (ref 65–100)
GLUCOSE SERPL-MCNC: 181 MG/DL (ref 65–100)
HBA1C MFR BLD: 7.3 % (ref 4.2–6.3)
HCT VFR BLD AUTO: 38.4 % (ref 41.1–50.3)
HGB BLD-MCNC: 12.9 G/DL (ref 13.6–17.2)
IMM GRANULOCYTES # BLD AUTO: 0 K/UL (ref 0–0.5)
IMM GRANULOCYTES NFR BLD AUTO: 1 % (ref 0–5)
LYMPHOCYTES # BLD: 1 K/UL (ref 0.5–4.6)
LYMPHOCYTES NFR BLD: 18 % (ref 13–44)
MCH RBC QN AUTO: 34.3 PG (ref 26.1–32.9)
MCHC RBC AUTO-ENTMCNC: 33.6 G/DL (ref 31.4–35)
MCV RBC AUTO: 102.1 FL (ref 79.6–97.8)
MM INDURATION POC: 0 MM (ref 0–5)
MONOCYTES # BLD: 0.5 K/UL (ref 0.1–1.3)
MONOCYTES NFR BLD: 9 % (ref 4–12)
NEUTS SEG # BLD: 3.9 K/UL (ref 1.7–8.2)
NEUTS SEG NFR BLD: 69 % (ref 43–78)
NRBC # BLD: 0 K/UL (ref 0–0.2)
PLATELET # BLD AUTO: 87 K/UL (ref 150–450)
PMV BLD AUTO: 10.1 FL (ref 9.4–12.3)
POTASSIUM SERPL-SCNC: 4.9 MMOL/L (ref 3.5–5.1)
PPD POC: NEGATIVE NEGATIVE
RBC # BLD AUTO: 3.76 M/UL (ref 4.23–5.6)
SERVICE CMNT-IMP: ABNORMAL
SODIUM SERPL-SCNC: 135 MMOL/L (ref 138–145)
WBC # BLD AUTO: 5.6 K/UL (ref 4.3–11.1)

## 2021-10-27 PROCEDURE — 80048 BASIC METABOLIC PNL TOTAL CA: CPT

## 2021-10-27 PROCEDURE — 74011250637 HC RX REV CODE- 250/637: Performed by: INTERNAL MEDICINE

## 2021-10-27 PROCEDURE — 65270000029 HC RM PRIVATE

## 2021-10-27 PROCEDURE — 74011250637 HC RX REV CODE- 250/637: Performed by: NURSE PRACTITIONER

## 2021-10-27 PROCEDURE — 97530 THERAPEUTIC ACTIVITIES: CPT

## 2021-10-27 PROCEDURE — 74011250636 HC RX REV CODE- 250/636: Performed by: INTERNAL MEDICINE

## 2021-10-27 PROCEDURE — 83036 HEMOGLOBIN GLYCOSYLATED A1C: CPT

## 2021-10-27 PROCEDURE — 82962 GLUCOSE BLOOD TEST: CPT

## 2021-10-27 PROCEDURE — 97162 PT EVAL MOD COMPLEX 30 MIN: CPT

## 2021-10-27 PROCEDURE — 36415 COLL VENOUS BLD VENIPUNCTURE: CPT

## 2021-10-27 PROCEDURE — 2709999900 HC NON-CHARGEABLE SUPPLY

## 2021-10-27 PROCEDURE — 74011250636 HC RX REV CODE- 250/636: Performed by: NURSE PRACTITIONER

## 2021-10-27 PROCEDURE — 74011636637 HC RX REV CODE- 636/637: Performed by: INTERNAL MEDICINE

## 2021-10-27 PROCEDURE — 85025 COMPLETE CBC W/AUTO DIFF WBC: CPT

## 2021-10-27 RX ORDER — POLYETHYLENE GLYCOL 3350 17 G/17G
17 POWDER, FOR SOLUTION ORAL DAILY PRN
Status: DISCONTINUED | OUTPATIENT
Start: 2021-10-27 | End: 2021-10-30 | Stop reason: HOSPADM

## 2021-10-27 RX ORDER — DOCUSATE SODIUM 100 MG/1
100 CAPSULE, LIQUID FILLED ORAL 2 TIMES DAILY
Status: DISCONTINUED | OUTPATIENT
Start: 2021-10-27 | End: 2021-10-30 | Stop reason: HOSPADM

## 2021-10-27 RX ORDER — MECLIZINE HCL 12.5 MG 12.5 MG/1
25 TABLET ORAL
Status: DISCONTINUED | OUTPATIENT
Start: 2021-10-27 | End: 2021-10-30 | Stop reason: HOSPADM

## 2021-10-27 RX ADMIN — MECLIZINE 25 MG: 12.5 TABLET ORAL at 11:14

## 2021-10-27 RX ADMIN — INSULIN LISPRO 4 UNITS: 100 INJECTION, SOLUTION INTRAVENOUS; SUBCUTANEOUS at 08:13

## 2021-10-27 RX ADMIN — OXYCODONE 5 MG: 5 TABLET ORAL at 08:25

## 2021-10-27 RX ADMIN — PIOGLITAZONE 30 MG: 15 TABLET ORAL at 06:23

## 2021-10-27 RX ADMIN — Medication 5 ML: at 15:52

## 2021-10-27 RX ADMIN — PRAVASTATIN SODIUM 40 MG: 20 TABLET ORAL at 21:17

## 2021-10-27 RX ADMIN — LEVOTHYROXINE SODIUM 125 MCG: 75 TABLET ORAL at 06:22

## 2021-10-27 RX ADMIN — Medication 10 ML: at 21:17

## 2021-10-27 RX ADMIN — INSULIN LISPRO 6 UNITS: 100 INJECTION, SOLUTION INTRAVENOUS; SUBCUTANEOUS at 21:17

## 2021-10-27 RX ADMIN — Medication 10 ML: at 06:23

## 2021-10-27 RX ADMIN — MULTIPLE VITAMINS W/ MINERALS TAB 1 TABLET: TAB at 08:13

## 2021-10-27 RX ADMIN — GABAPENTIN 300 MG: 300 CAPSULE ORAL at 08:13

## 2021-10-27 RX ADMIN — INSULIN LISPRO 4 UNITS: 100 INJECTION, SOLUTION INTRAVENOUS; SUBCUTANEOUS at 11:14

## 2021-10-27 RX ADMIN — METFORMIN HYDROCHLORIDE 1000 MG: 500 TABLET, EXTENDED RELEASE ORAL at 08:13

## 2021-10-27 RX ADMIN — GABAPENTIN 300 MG: 300 CAPSULE ORAL at 15:51

## 2021-10-27 RX ADMIN — DOCUSATE SODIUM 100 MG: 100 CAPSULE, LIQUID FILLED ORAL at 21:27

## 2021-10-27 RX ADMIN — HYDROMORPHONE HYDROCHLORIDE 1 MG: 1 INJECTION, SOLUTION INTRAMUSCULAR; INTRAVENOUS; SUBCUTANEOUS at 15:51

## 2021-10-27 RX ADMIN — INSULIN LISPRO 2 UNITS: 100 INJECTION, SOLUTION INTRAVENOUS; SUBCUTANEOUS at 18:21

## 2021-10-27 RX ADMIN — ENOXAPARIN SODIUM 40 MG: 40 INJECTION SUBCUTANEOUS at 21:17

## 2021-10-27 RX ADMIN — ROPINIROLE HYDROCHLORIDE 0.5 MG: 0.5 TABLET, FILM COATED ORAL at 21:17

## 2021-10-27 RX ADMIN — LISINOPRIL AND HYDROCHLOROTHIAZIDE 1 TABLET: 12.5; 2 TABLET ORAL at 08:13

## 2021-10-27 RX ADMIN — GABAPENTIN 300 MG: 300 CAPSULE ORAL at 21:17

## 2021-10-27 NOTE — PROGRESS NOTES
ACUTE PHYSICAL THERAPY GOALS:  (Developed with and agreed upon by patient and/or caregiver.)  (Developed with and agreed upon by patient and/or caregiver.)  (1.) Angela Brewer will move from supine to sit and sit to supine , scoot up and down and roll side to side with MODIFIED INDEPENDENCE within 7 treatment day(s). (2.) Angela Brewer will transfer from bed to chair and chair to bed with MINIMAL ASSIST while maintaining NWB RLE using the least restrictive device within 7 treatment day(s). (3.) Angela Brewer will ambulate with MINIMAL ASSIST for 15 feet  while maintaining NWB RLE  with the least restrictive device within 7 treatment day(s). (4.) Angela Brewer will perform standing static and dynamic balance activities x 10 minutes with MINIMAL ASSIST  while maintaining NWB RLE  to improve safety within 7 treatment day(s). (5.) Angela Brewer will perform therapeutic exercises x 15 min for HEP with INDEPENDENCE to improve strength, endurance, and functional mobility within 7 treatment day(s).      PHYSICAL THERAPY: Daily Note and PM Treatment Day # 1    Angela Brewer is a 80 y.o. male   PRIMARY DIAGNOSIS: Ankle fracture, right  Closed trimalleolar fracture of right ankle, initial encounter [S82.851A]  Ankle fracture, right [S82.891A]  Procedure(s) (LRB):  RIGHT ANKLE OPEN REDUCTION INTERNAL FIXATION WITH POSS SYNDESMOTIC SCREW CHOICE ANES (Right)  1 Day Post-Op    ASSESSMENT:     REHAB RECOMMENDATIONS: CURRENT LEVEL OF FUNCTION:  (Most Recently Demonstrated)   Recommendation to date pending progress:  Setting:   Short-term Rehab  Equipment:    To Be Determined Bed Mobility:   Minimal Assistance  Sit to Stand:   Moderate Assistance x 2  Transfers:   Maximal Assistance x 2  Gait/Mobility:   Unable to perform     ASSESSMENT:  Mr. Berhane Rodriguez is a 80year old M who presents s/p RLE ankle ORIF after he sustained a R ankle fx when he slipped on some acorns. NWB RLE.      This afternoon pt presents sitting up in chair, ready to get back to bed. His biggest complaint is fatigue, and also that he has not had a bowel movement in several days. RN and MD aware. Mod Ax2 for sit <> Stand transfers, Max Ax2 for pivot to bedside commode. Constant cues and support to maintain NWB RLE. Pt still with difficulty achieving fully upright posture and balance control, making him a high risk for falls. Min A for sit > supine (assist with RLE). Pt positioned with LE elevated for comfort. Pt presents as functioning below his baseline, with deficits in mobility including transfers, gait, balance, and activity tolerance. Pt will benefit from skilled therapy services to address stated deficits to promote return to highest level of function, independence, and safety. Will continue to follow. SUBJECTIVE:   Mr. Candance Long states, \"This is difficult to do. \"    SOCIAL HISTORY/ LIVING ENVIRONMENT:  Lives with his spouse (who is in poor health), typically active and independent at baseline. 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 Lives in a one story home, states there are stairs but also a ramp he can use to enter.    Home Environment: Private residence  One/Two Story Residence: One story  Living Alone: No  Support Systems: Spouse/Significant Other, Child(nathaly), Other Family Member(s)  OBJECTIVE:     PAIN: VITAL SIGNS: LINES/DRAINS:   Pre Treatment: Pain Screen  Pain Scale 1: Numeric (0 - 10)  Pain Intensity 1: 4  Pain Onset 1: post op  Pain Location 1: Ankle  Pain Orientation 1: Right  Pain Description 1: Sore;Aching  Post Treatment: 4/10 Vital Signs  O2 Device: None (Room air) None  O2 Device: None (Room air)     MOBILITY: I Mod I S SBA CGA Min Mod Max Total  NT x2 Comments:   Bed Mobility    Rolling [] [] [] [] [] [x] [] [] [] [] []    Supine to Sit [] [] [] [] [] [x] [] [] [] [] []    Scooting [] [] [] [] [] [x] [] [] [] [] []    Sit to Supine [] [] [] [] [] [x] [] [] [] [] []    Transfers    Sit to Stand [] [] [] [] [] [] [x] [] [] [] [x]    Bed to Chair [] [] [] [] [] [] [] [x] [] [] [x]    Stand to Sit [] [] [] [] [] [] [x] [x] [] [] [x]    I=Independent, Mod I=Modified Independent, S=Supervision, SBA=Standby Assistance, CGA=Contact Guard Assistance,   Min=Minimal Assistance, Mod=Moderate Assistance, Max=Maximal Assistance, Total=Total Assistance, NT=Not Tested    BALANCE: Good Fair+ Fair Fair- Poor NT Comments   Sitting Static [x] [] [] [] [] []    Sitting Dynamic [x] [] [] [] [] []              Standing Static [] [] [] [x] [x] []    Standing Dynamic [] [] [] [] [x] []      GAIT: I Mod I S SBA CGA Min Mod Max Total  NT x2 Comments:   Level of Assistance [] [] [] [] [] [] [] [] [] [x] []    Distance N/A    DME Rolling Walker and Gait Belt    Gait Quality N/A    Weightbearing  Status NWB RLE    I=Independent, Mod I=Modified Independent, S=Supervision, SBA=Standby Assistance, CGA=Contact Guard Assistance,   Min=Minimal Assistance, Mod=Moderate Assistance, Max=Maximal Assistance, Total=Total Assistance, NT=Not Tested    PLAN:   FREQUENCY/DURATION: PT Plan of Care: BID for duration of hospital stay or until stated goals are met, whichever comes first.  TREATMENT:     TREATMENT:   ($$ Therapeutic Activity: 38-52 mins    )  Therapeutic Activity (39 Minutes): Therapeutic activity included Sit to Supine, Scooting, Transfer Training, Sitting balance  and Standing balance to improve functional Mobility, Strength, ROM and Activity tolerance.     TREATMENT GRID:  N/A    AFTER TREATMENT POSITION/PRECAUTIONS:  Bed, Needs within reach and RN notified    INTERDISCIPLINARY COLLABORATION:  RN/PCT, PT/PTA and Rehab Attendant     TOTAL TREATMENT DURATION:  PT Patient Time In/Time Out  Time In: 1305  Time Out: DARELL Bailey

## 2021-10-27 NOTE — PROGRESS NOTES
Hospitalist Progress Note   Admit Date:  10/26/2021  7:29 AM   Name:  Fernnado Neff   Age:  80 y.o. Sex:  male  :  1940   MRN:  [id number]   Room:  [facility]/    Presenting Complaint: No chief complaint on file. Reason(s) for Admission: Closed trimalleolar fracture of right ankle, initial encounter [S82.731L]  Ankle fracture, right [facility] Course & Interval History:     Mr. Emmanuel Parrish is a 79 yo male with PMH of DM2, HTN admitted to ortho s/p closed trimalleolar fracture of right ankle. He is s/p repair. Discharge plans pending.          Subjective (10/27/21):    Up to chair, some pain, constipated, passing gas, ate ok     Assessment & Plan:     Principal Problem:    Ankle fracture, right (10/26/2021)  ·   Postop per orthopedics       Active Problems:    Acquired hypothyroidism (10/17/2016)  ·   Continued synthroid        Hypertension, essential, benign (10/17/2016)  · Continued lisinopril- HCTZ         Type 2 diabetes mellitus with hyperglycemia, without long-term current use of insulin ([address] (10/26/2021)  · Continued sliding scale coverage   · Holding actos, metformin   · continued jardiance         Thrombocytopenia:  · Trend CBC      Dispo/Discharge Planning:    Pending     Diet:  ADULT DIET Regular; 4 carb choices (60 gm/meal)  DVT PPx: lovenox  Code status: Full Code    Hospital Problems as of 10/27/2021 Date Reviewed: 3/2/2021        Codes Class Noted - Resolved POA    Type 2 diabetes mellitus with hyperglycemia, without long-term current use of insulin (HCC) ICD-10-CM: E11.65  ICD-9-CM: 250.00, 790.29  10/26/2021 - Present Yes        * (Principal) Ankle fracture, right ICD-10-CM: E26.172S  ICD-9-CM: 824.8  10/26/2021 - Present Yes        Acquired hypothyroidism (Chronic) ICD-10-CM: E03.9  ICD-9-CM: 244.9  10/17/2016 - Present Yes        Hypertension, essential, benign (Chronic) ICD-10-CM: I10  ICD-9-CM: 401.1  10/17/2016 - Present Yes        Diabetic neuropathy, painful ([facility] [address] (Chronic) ICD-10-CM: E11.40  ICD-9-CM: 250.60, 357.2  10/17/2016 - Present Yes              Objective:     Patient Vitals for the past 24 hrs:   Temp Pulse Resp BP SpO2   10/27/21 1523 97.3 °F (36.3 °C) 72 18 119/66 95 %   10/27/21 1057 97.7 °F (36.5 °C) 65 18 107/63 97 %   10/27/21 0814 97.3 °F (36.3 °C) 68 18 119/69 93 %   10/27/21 0344 98.5 °F (36.9 °C) 91 16 125/67 100 %   10/27/21 0045 98.3 °F (36.8 °C) 82 18 129/65 99 %   10/26/21 2025 97.6 °F (36.4 °C) 76 18 132/63 96 %     Oxygen Therapy  O2 Sat (%): 95 % (10/27/21 1523)  Pulse via Oximetry: 67 beats per minute (10/26/21 1231)  O2 Device: None (Room air) (10/27/21 1305)  O2 Flow Rate (L/min): 3 l/min (10/26/21 1158)    Estimated body mass index is 30.68 kg/m² as calculated from the following:    Height as of this encounter: 5' 11\" (1.803 m). Weight as of this encounter: 99.8 kg (220 lb). Intake/Output Summary (Last 24 hours) at 10/27/2021 1725  Last data filed at 10/27/2021 0630  Gross per 24 hour   Intake 900 ml   Output 850 ml   Net 50 ml         Physical Exam:     Blood pressure 119/66, pulse 72, temperature 97.3 °F (36.3 °C), resp. rate 18, height 5' 11\" (1.803 m), weight 99.8 kg (220 lb), SpO2 95 %. General:    Well nourished. No overt distress  CV:   RRR. No m/r/g. No jugular venous distension. Lungs:   CTAB. No wheezing, rhonchi, or rales. Respirations even, unlabored- anterior   Abdomen: Bowel sounds present. Soft, nontender, nondistended. Extremities: No cyanosis or clubbing. No edema  Skin:     No rashes and normal coloration. Warm and dry. Neuro:   grossly intact. Psych:  Normal mood and affect.       I have reviewed ordered lab tests and independently visualized imaging below:    Recent Labs:  Recent Results (from the past 48 hour(s))   POC SODIUM-POTASSIUM    Collection Time: 10/26/21  8:59 AM   Result Value Ref Range    Potassium, POC 4.8 3.5 - 5.1 MMOL/L   GLUCOSE, POC    Collection Time: 10/26/21  8:59 AM   Result Value Ref Range    Glucose (POC) 168 (H) 65 - 100 mg/dL    Performed by Miguel A    HEMOGLOBIN    Collection Time: 10/26/21  9:01 AM   Result Value Ref Range    HGB 14.2 13.6 - 17.2 g/dL   SARS-COV-2    Collection Time: 10/26/21  9:44 AM   Result Value Ref Range    SARS-CoV-2 NASAL     COVID-19 RAPID TEST    Collection Time: 10/26/21  9:44 AM   Result Value Ref Range    Specimen source NASAL      COVID-19 rapid test Not detected NOTD     GLUCOSE, POC    Collection Time: 10/26/21  4:14 PM   Result Value Ref Range    Glucose (POC) 292 (H) 65 - 100 mg/dL    Performed by Codi    GLUCOSE, POC    Collection Time: 10/26/21  9:00 PM   Result Value Ref Range    Glucose (POC) 196 (H) 65 - 100 mg/dL    Performed by Aneudy Benjamin    GLUCOSE, POC    Collection Time: 10/27/21  6:20 AM   Result Value Ref Range    Glucose (POC) 203 (H) 65 - 100 mg/dL    Performed by Yasmeen    CBC WITH AUTOMATED DIFF    Collection Time: 10/27/21  7:30 AM   Result Value Ref Range    WBC 5.6 4.3 - 11.1 K/uL    RBC 3.76 (L) 4.23 - 5.6 M/uL    HGB 12.9 (L) 13.6 - 17.2 g/dL    HCT 38.4 (L) 41.1 - 50.3 %    .1 (H) 79.6 - 97.8 FL    MCH 34.3 (H) 26.1 - 32.9 PG    MCHC 33.6 31.4 - 35.0 g/dL    RDW 13.9 11.9 - 14.6 %    PLATELET 87 (L) 854 - 450 K/uL    MPV 10.1 9.4 - 12.3 FL    ABSOLUTE NRBC 0.00 0.0 - 0.2 K/uL    DF AUTOMATED      NEUTROPHILS 69 43 - 78 %    LYMPHOCYTES 18 13 - 44 %    MONOCYTES 9 4.0 - 12.0 %    EOSINOPHILS 3 0.5 - 7.8 %    BASOPHILS 1 0.0 - 2.0 %    IMMATURE GRANULOCYTES 1 0.0 - 5.0 %    ABS. NEUTROPHILS 3.9 1.7 - 8.2 K/UL    ABS. LYMPHOCYTES 1.0 0.5 - 4.6 K/UL    ABS. MONOCYTES 0.5 0.1 - 1.3 K/UL    ABS. EOSINOPHILS 0.2 0.0 - 0.8 K/UL    ABS. BASOPHILS 0.0 0.0 - 0.2 K/UL    ABS. IMM.  GRANS. 0.0 0.0 - 0.5 K/UL   METABOLIC PANEL, BASIC    Collection Time: 10/27/21  7:30 AM   Result Value Ref Range    Sodium 135 (L) 138 - 145 mmol/L    Potassium 4.9 3.5 - 5.1 mmol/L    Chloride 103 98 - 107 mmol/L    CO2 27 21 - 32 mmol/L    Anion gap 5 (L) 7 - 16 mmol/L    Glucose 181 (H) 65 - 100 mg/dL    BUN 35 (H) 8 - 23 MG/DL    Creatinine 1.19 0.8 - 1.5 MG/DL    GFR est AA >60 >60 ml/min/1.73m2    GFR est non-AA >60 >60 ml/min/1.73m2    Calcium 8.9 8.3 - 10.4 MG/DL   HEMOGLOBIN A1C WITH EAG    Collection Time: 10/27/21  7:36 AM   Result Value Ref Range    Hemoglobin A1c 7.3 (H) 4.2 - 6.3 %    Est. average glucose 163 mg/dL   GLUCOSE, POC    Collection Time: 10/27/21 10:35 AM   Result Value Ref Range    Glucose (POC) 237 (H) 65 - 100 mg/dL    Performed by Judith    PLEASE READ & DOCUMENT PPD TEST IN 24 HRS    Collection Time: 10/27/21  3:52 PM   Result Value Ref Range    PPD Negative Negative    mm Induration 0 0 - 5 mm   GLUCOSE, POC    Collection Time: 10/27/21  4:16 PM   Result Value Ref Range    Glucose (POC) 169 (H) 65 - 100 mg/dL    Performed by Humza        All Micro Results     Procedure Component Value Units Date/Time    COVID-19 RAPID TEST [[id number]] Collected: 10/26/21 0944    Order Status: Completed Specimen: Nasopharyngeal Updated: 10/26/21 1048     Specimen source NASAL        Comment: RAPID ONLY        COVID-19 rapid test Not detected        Comment:      The specimen is NEGATIVE for SARS-CoV-2, the novel coronavirus associated with COVID-19. A negative result does not rule out COVID-19. This test has been authorized by the FDA under an Emergency Use Authorization (EUA) for use by authorized laboratories. Fact sheet for Healthcare Providers: ConventionUpdate.co.nz  Fact sheet for Patients: ConventionUpdate.co.nz       Methodology: Isothermal Nucleic Acid Amplification               Other Studies:  No results found.     Current Meds:  Current Facility-Administered Medications   Medication Dose Route Frequency    influenza vaccine 2021-22 (6 mos+)(PF) (FLUARIX/FLULAVAL/FLUZONE QUAD) injection 0.5 mL  1 Each IntraMUSCular PRIOR TO DISCHARGE  meclizine (ANTIVERT) tablet 25 mg  25 mg Oral Q6H PRN    sodium chloride (NS) flush 5-40 mL  5-40 mL IntraVENous PRN    empagliflozin (JARDIANCE) tablet 25 mg (Patient Supplied)  25 mg Oral DAILY    multivitamin, tx-iron-ca-min (THERA-M w/ IRON) tablet 1 Tablet  1 Tablet Oral DAILY    gabapentin (NEURONTIN) capsule 300 mg  300 mg Oral TID    levothyroxine (SYNTHROID) tablet 125 mcg  125 mcg Oral ACB    lisinopril-hydroCHLOROthiazide (PRINZIDE, ZESTORETIC) 20-12.5 mg per tablet 1 Tablet  1 Tablet Oral DAILY    [Held by provider] metFORMIN ER (GLUCOPHAGE XR) tablet 1,000 mg  1,000 mg Oral ACB&D    [Held by provider] pioglitazone (ACTOS) tablet 30 mg  30 mg Oral 7am    pravastatin (PRAVACHOL) tablet 40 mg  40 mg Oral QHS    rOPINIRole (REQUIP) tablet 0.5 mg  0.5 mg Oral QHS    sodium chloride (NS) flush 5-40 mL  5-40 mL IntraVENous Q8H    sodium chloride (NS) flush 5-40 mL  5-40 mL IntraVENous PRN    acetaminophen (TYLENOL) tablet 650 mg  650 mg Oral Q4H PRN    oxyCODONE IR (ROXICODONE) tablet 5 mg  5 mg Oral Q4H PRN    HYDROmorphone (DILAUDID) injection 1 mg  1 mg IntraVENous Q4H PRN    enoxaparin (LOVENOX) injection 40 mg  40 mg SubCUTAneous Q24H    insulin lispro (HUMALOG) injection   SubCUTAneous AC&HS    dextrose 40% (GLUTOSE) oral gel 1 Tube  15 g Oral PRN    glucagon (GLUCAGEN) injection 1 mg  1 mg IntraMUSCular PRN    dextrose (D50W) injection syrg 12.5-25 g  25-50 mL IntraVENous PRN       Signed:  Henry Pressley MD    Part of this note may have been written by using a voice dictation software. The note has been proof read but may still contain some grammatical/other typographical errors.

## 2021-10-27 NOTE — PROGRESS NOTES
[facility] FRACTURE PROGRESS NOTE    2021  Admit Date:   10/26/2021    Post Op day: 1 Day Post-Op    Subjective:    Adams Polanco      PT/OT:   Gait:                    Vital Signs:    Patient Vitals for the past 8 hrs:   BP Temp Pulse Resp SpO2   10/27/21 1057 107/63 97.7 °F (36.5 °C) 65 18 97 %   10/27/21 0814 119/69 97.3 °F (36.3 °C) 68 18 93 %     Temp (24hrs), Av.8 °F (36.6 °C), Min:97.3 °F (36.3 °C), Max:98.5 °F (36.9 °C)      Pain Control:   Pain Assessment  Pain Scale 1: Numeric (0 - 10)  Pain Intensity 1: 3  Pain Onset 1: post op  Pain Location 1: Ankle  Pain Orientation 1: Right  Pain Description 1: Sore, Aching  Pain Intervention(s) 1: Medication (see MAR)    Meds:    Current Facility-Administered Medications   Medication Dose Route Frequency    influenza vaccine  (6 mos+)(PF) (FLUARIX/FLULAVAL/FLUZONE QUAD) injection 0.5 mL  1 Each IntraMUSCular PRIOR TO DISCHARGE    meclizine (ANTIVERT) tablet 25 mg  25 mg Oral Q6H PRN    sodium chloride (NS) flush 5-40 mL  5-40 mL IntraVENous PRN    empagliflozin (JARDIANCE) tablet 25 mg (Patient Supplied)  25 mg Oral DAILY    multivitamin, tx-iron-ca-min (THERA-M w/ IRON) tablet 1 Tablet  1 Tablet Oral DAILY    gabapentin (NEURONTIN) capsule 300 mg  300 mg Oral TID    levothyroxine (SYNTHROID) tablet 125 mcg  125 mcg Oral ACB    lisinopril-hydroCHLOROthiazide (PRINZIDE, ZESTORETIC) 20-12.5 mg per tablet 1 Tablet  1 Tablet Oral DAILY    [Held by provider] metFORMIN ER (GLUCOPHAGE XR) tablet 1,000 mg  1,000 mg Oral ACB&D    [Held by provider] pioglitazone (ACTOS) tablet 30 mg  30 mg Oral 7am    pravastatin (PRAVACHOL) tablet 40 mg  40 mg Oral QHS    rOPINIRole (REQUIP) tablet 0.5 mg  0.5 mg Oral QHS    sodium chloride (NS) flush 5-40 mL  5-40 mL IntraVENous Q8H    sodium chloride (NS) flush 5-40 mL  5-40 mL IntraVENous PRN    acetaminophen (TYLENOL) tablet 650 mg  650 mg Oral Q4H PRN    oxyCODONE IR (ROXICODONE) tablet 5 mg  5 mg Oral Q4H PRN    HYDROmorphone (DILAUDID) injection 1 mg  1 mg IntraVENous Q4H PRN    enoxaparin (LOVENOX) injection 40 mg  40 mg SubCUTAneous Q24H    tuberculin injection 5 Units  5 Units IntraDERMal ONCE    insulin lispro (HUMALOG) injection   SubCUTAneous AC&HS    dextrose 40% (GLUTOSE) oral gel 1 Tube  15 g Oral PRN    glucagon (GLUCAGEN) injection 1 mg  1 mg IntraMUSCular PRN    dextrose (D50W) injection syrg 12.5-25 g  25-50 mL IntraVENous PRN       LAB:    Recent Labs     10/27/21  0730   HCT 38.4*   HGB 12.9*       24 Hour Assessment Issues:    Oriented    Discharge Planning: SNF    Transfuse PRBC's:      Assessment & Physician's Comment:  Neurovascular checks within normal limits    Principal Problem:    Ankle fracture, right (10/26/2021)    Active Problems:    Acquired hypothyroidism (10/17/2016)      Hypertension, essential, benign (10/17/2016)      Diabetic neuropathy, painful ([facility] [address] (10/17/2016)      Type 2 diabetes mellitus with hyperglycemia, without long-term current use of insulin ([address] (10/26/2021)        Plan:  Continue physical therapy. Will need rehab placement.     Macy Francisco MD

## 2021-10-27 NOTE — PROGRESS NOTES
Physician Progress Note      Kailey Leroy  CSN #:                  [id number]  :                       1940  ADMIT DATE:       10/26/2021 7:29 AM  DISCH DATE:  RESPONDING  PROVIDER #:        Chichi Watt MD          QUERY TEXT:    Pt admitted with ***. Pt noted to have ***. If possible, please document in the progress notes and discharge summary if you are evaluating and/or treating any of the following: The medical record reflects the following:  Risk Factors: Surgery, documented blood loss minimal    Clinical Indicators: HGB 14.2(10/26) to 12.9(10/27)    Treatment: IVF for now      Thank you. Lj Espitia RN CDI, CCS  My office phone [phone]  Options provided:  -- Acute blood loss anemia  -- Acute on chronic blood loss anemia  -- Postoperative acute blood loss anemia  -- Dilutional anemia  -- Other - I will add my own diagnosis  -- Disagree - Not applicable / Not valid  -- Disagree - Clinically unable to determine / Unknown  -- Refer to Clinical Documentation Reviewer    PROVIDER RESPONSE TEXT:    This patient has postoperative acute blood loss anemia.     Query created by: Noah Barone on 10/27/2021 12:10 PM      Electronically signed by:  Chichi Watt MD 10/27/2021 1:10 PM

## 2021-10-27 NOTE — PROGRESS NOTES
Pt is a 79 yo male admitted for surgical repair of an ankle fracture. PT/OT evals complete with the recommendation for STR at [facility]. SW met with pt to discuss dc planning. Demographics, insurance and PCP confirmed. Pt is in agreement with STR and from a list of facilities in network with his insurance, he requested a referral to [facility]. Referral submitted and pt accepted for admission pending insurance approval.  Facility is initiating the auth request today. PPD placed 10/26/21. COVID test will be ordered on day of dc. SW following to facilitate pt's transfer to rehab at [facility]. Care Management Interventions  PCP Verified by CM:  Yes  Mode of Transport at Discharge: BLS  Transition of Care Consult (CM Consult): SNF  Partner SNF: Yes  Discharge Durable Medical Equipment: No  Physical Therapy Consult: Yes  Occupational Therapy Consult: Yes  Speech Therapy Consult: No  Support Systems: Other Family Member(s), Child(nathaly), Spouse/Significant Other  Confirm Follow Up Transport: Family  The Plan for Transition of Care is Related to the Following Treatment Goals : STR to improve pt's strength and functional abilities for a safe transition to home  The Patient and/or Patient Representative was Provided with a Choice of Provider and Agrees with the Discharge Plan?: Yes  Freedom of Choice List was Provided with Basic Dialogue that Supports the Patient's Individualized Plan of Care/Goals, Treatment Preferences and Shares the Quality Data Associated with the Providers?: Yes  Discharge Location  Discharge Placement: Rehab Unit Subacute [employer])

## 2021-10-27 NOTE — PROGRESS NOTES
ACUTE PHYSICAL THERAPY GOALS:  (Developed with and agreed upon by patient and/or caregiver.)  (1.) Fernando Neff will move from supine to sit and sit to supine , scoot up and down and roll side to side with MODIFIED INDEPENDENCE within 7 treatment day(s). (2.) Fernando Neff will transfer from bed to chair and chair to bed with MINIMAL ASSIST while maintaining NWB RLE using the least restrictive device within 7 treatment day(s). (3.) Fernando Neff will ambulate with MINIMAL ASSIST for 15 feet  while maintaining NWB RLE  with the least restrictive device within 7 treatment day(s). (4.) Fernando Neff will perform standing static and dynamic balance activities x 10 minutes with MINIMAL ASSIST  while maintaining NWB RLE  to improve safety within 7 treatment day(s). (5.) Fernando Neff will perform therapeutic exercises x 15 min for HEP with INDEPENDENCE to improve strength, endurance, and functional mobility within 7 treatment day(s).      PHYSICAL THERAPY ASSESSMENT: Initial Assessment and AM PT Treatment Day # 1    Fernando Neff is a 80 y.o. male   PRIMARY DIAGNOSIS: Ankle fracture, right  Closed trimalleolar fracture of right ankle, initial encounter [S82.851A]  Ankle fracture, right [S82.891A]  Procedure(s) (LRB):  RIGHT ANKLE OPEN REDUCTION INTERNAL FIXATION WITH POSS SYNDESMOTIC SCREW CHOICE ANES (Right)  1 Day Post-Op  Reason for Referral:   ICD-10: Treatment Diagnosis: Difficulty in walking, Not elsewhere classified (R26.2)  Other abnormalities of gait and mobility (R26.89)  Other lack of cordination (R27.8)  History of falling (Z91.81)  INPATIENT: Payor: [employer] [place] [employer] / Plan[place] [employer] [place] [employer] [place]O / Product Type: Managed Care Medicare /     ASSESSMENT:     REHAB RECOMMENDATIONS:   Recommendation to date pending progress:  Setting:   Short-term Rehab  Equipment:    To Be Determined     PRIOR LEVEL OF FUNCTION:  (Prior to Hospitalization) INITIAL/CURRENT LEVEL OF FUNCTION:  (Most Recently Demonstrated)   Bed Mobility:   Independent  Sit to Stand:   Independent  Transfers:   Independent  Gait/Mobility:   Independent Bed Mobility:   Minimal Assistance x 2  Sit to Stand:   Moderate Assistance x 2  Transfers:   Maximal Assistance x 2  Gait/Mobility:   Unable to perform     ASSESSMENT:  Mr. Laurence Gitelman is a 80year old M who presents s/p RLE ankle ORIF after he sustained a R ankle fx when he slipped on some acorns. NWB RLE. This date pt performs mobility including bed mobility Min Ax2 with additional time, sit <> stand transfers Mod Ax2, and stand pivot transfers/shuffling to chair with Max Ax2 with BUE support at [facility]. Constant cues and support to maintain NWB RLE. Pt with difficulty achieving fully upright posture and balance control. Pt presents as functioning below his baseline, with deficits in mobility including transfers, gait, balance, and activity tolerance. Pt will benefit from skilled therapy services to address stated deficits to promote return to highest level of function, independence, and safety. Will continue to follow. SUBJECTIVE:   Mr. Laurence Gitelman states, \"I feel so helpless. \"    SOCIAL HISTORY/LIVING ENVIRONMENT: Lives with his spouse (who is in poor health), typically active and independent at baseline. 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 Lives in a one story home, states there are stairs but also a ramp he can use to enter. Home Environment: Private residence  One/Two Story Residence: One story  Living Alone: No  Support Systems: Spouse/Significant Other, Child(nathaly), Other Family Member(s)  OBJECTIVE:     PAIN: VITAL SIGNS: LINES/DRAINS:   Pre Treatment: Pain Screen  Pain Scale 1: Numeric (0 - 10)  Pain Intensity 1: 2  Pain Onset 1: post op  Pain Location 1: Ankle  Pain Orientation 1: Right  Pain Description 1: Sore;Aching  Post Treatment: 4/10.  Increase with mobility/dependent position Vital Signs  O2 Device: None (Room air) None  O2 Device: None (Room air)     GROSS EVALUATION:  BLE Within Functional Limits Abnormal/ Functional Abnormal/ Non-Functional (see comments) Not Tested Comments:   AROM [x] [] [] []    PROM [x] [] [] []    Strength [] [x] [] []    Balance [] [x] [] []  poor standing balance control   Posture [x] [] [] []    Sensation [] [x] [] []  neuropathy BLE   Coordination [x] [] [] []    Tone [] [] [] [x]    Edema [] [] [] [x]    Activity Tolerance [] [x] [] []     [] [] [] []      COGNITION/  PERCEPTION: Intact Impaired   (see comments) Comments:   Orientation [x] []    Vision [x] []    Hearing [] [x]  hard of hearing; hearing aides   Command Following [x] []    Safety Awareness [x] []     [] []      MOBILITY: I Mod I S SBA CGA Min Mod Max Total  NT x2 Comments:   Bed Mobility    Rolling [] [] [] [] [] [x] [] [] [] [] [x]    Supine to Sit [] [] [] [] [] [x] [] [] [] [] [x]    Scooting [] [] [] [] [] [x] [] [] [] [] [x]    Sit to Supine [] [] [] [] [] [x] [] [] [] [] [x]    Transfers    Sit to Stand [] [] [] [] [] [] [x] [] [] [] [x]    Bed to Chair [] [] [] [] [] [] [] [x] [] [] [x]    Stand to Sit [] [] [] [] [] [] [x] [x] [] [] [x]    I=Independent, Mod I=Modified Independent, S=Supervision, SBA=Standby Assistance, CGA=Contact Guard Assistance,   Min=Minimal Assistance, Mod=Moderate Assistance, Max=Maximal Assistance, Total=Total Assistance, NT=Not Tested  GAIT: I Mod I S SBA CGA Min Mod Max Total  NT x2 Comments:   Level of Assistance [] [] [] [] [] [] [] [] [] [x] []    Distance N/A    DME Rolling Walker and Gait Belt    Gait Quality N/A    Weightbearing Status NWB RLE    I=Independent, Mod I=Modified Independent, S=Supervision, SBA=Standby Assistance, CGA=Contact Guard Assistance,   Min=Minimal Assistance, Mod=Moderate Assistance, Max=Maximal Assistance, Total=Total Assistance, NT=Not Tested    [facility] [address] [facility] Inpatient Short Form       How much difficulty does the patient currently have. .. Unable A Lot A Little None   1. Turning over in bed (including adjusting bedclothes, sheets and blankets)? [] 1   [] 2   [x] 3   [] 4   2. Sitting down on and standing up from a chair with arms ( e.g., wheelchair, bedside commode, etc.)   [] 1   [x] 2   [] 3   [] 4   3. Moving from lying on back to sitting on the side of the bed? [] 1   [] 2   [x] 3   [] 4   How much help from another person does the patient currently need. .. Total A Lot A Little None   4. Moving to and from a bed to a chair (including a wheelchair)? [] 1   [x] 2   [] 3   [] 4   5. Need to walk in hospital room? [x] 1   [] 2   [] 3   [] 4   6. Climbing 3-5 steps with a railing? [x] 1   [] 2   [] 3   [] 4   © 2007, Trustees of [address] [place] [address], under license to [employer]. All rights reserved     Score:  Initial: 12 Most Recent: X (Date: -- )    Interpretation of Tool:  Represents activities that are increasingly more difficult (i.e. Bed mobility, Transfers, Gait). PLAN:   FREQUENCY/DURATION: PT Plan of Care: BID for duration of hospital stay or until stated goals are met, whichever comes first.    PROBLEM LIST:   (Skilled intervention is medically necessary to address:)  1. Decreased Activity Tolerance  2. Decreased AROM/PROM  3. Decreased Balance  4. Decreased Coordination  5. Decreased Gait Ability  6. Decreased Strength  7. Decreased Transfer Abilities  8. Increased Pain   INTERVENTIONS PLANNED:   (Benefits and precautions of physical therapy have been discussed with the patient.)  1. Therapeutic Activity  2. Therapeutic Exercise/HEP  3. Neuromuscular Re-education  4. Gait Training  5. Manual Therapy  6. Education     TREATMENT:     EVALUATION: Moderate Complexity : (Untimed Charge)    TREATMENT:   ($$ Therapeutic Activity: 23-37 mins    )  Therapeutic Activity (30 Minutes):  Therapeutic activity included Supine to Sit, Scooting, Lateral Scooting, Transfer Training, Sitting balance  and Standing balance to improve functional Mobility, Strength, Activity tolerance and balance.     TREATMENT GRID:  N/A    AFTER TREATMENT POSITION/PRECAUTIONS:  Chair, Needs within reach and RN notified    INTERDISCIPLINARY COLLABORATION:  MD/PA/NP, RN/PCT, PT/PTA and Rehab Attendant     TOTAL TREATMENT DURATION:  PT Patient Time In/Time Out  Time In: 0900  Time Out: DARELL Garcia

## 2021-10-28 LAB
GLUCOSE BLD STRIP.AUTO-MCNC: 181 MG/DL (ref 65–100)
GLUCOSE BLD STRIP.AUTO-MCNC: 188 MG/DL (ref 65–100)
GLUCOSE BLD STRIP.AUTO-MCNC: 204 MG/DL (ref 65–100)
GLUCOSE BLD STRIP.AUTO-MCNC: 258 MG/DL (ref 65–100)
MM INDURATION POC: 0 MM (ref 0–5)
PPD POC: NEGATIVE NEGATIVE
SERVICE CMNT-IMP: ABNORMAL

## 2021-10-28 PROCEDURE — 74011250636 HC RX REV CODE- 250/636: Performed by: NURSE PRACTITIONER

## 2021-10-28 PROCEDURE — 82962 GLUCOSE BLOOD TEST: CPT

## 2021-10-28 PROCEDURE — 65270000029 HC RM PRIVATE

## 2021-10-28 PROCEDURE — 74011250637 HC RX REV CODE- 250/637: Performed by: NURSE PRACTITIONER

## 2021-10-28 PROCEDURE — 74011636637 HC RX REV CODE- 636/637: Performed by: INTERNAL MEDICINE

## 2021-10-28 PROCEDURE — 74011250637 HC RX REV CODE- 250/637: Performed by: INTERNAL MEDICINE

## 2021-10-28 PROCEDURE — 97530 THERAPEUTIC ACTIVITIES: CPT

## 2021-10-28 PROCEDURE — 2709999900 HC NON-CHARGEABLE SUPPLY

## 2021-10-28 RX ORDER — BISACODYL 5 MG
5 TABLET, DELAYED RELEASE (ENTERIC COATED) ORAL DAILY PRN
Status: DISCONTINUED | OUTPATIENT
Start: 2021-10-28 | End: 2021-10-30 | Stop reason: HOSPADM

## 2021-10-28 RX ORDER — FACIAL-BODY WIPES
10 EACH TOPICAL DAILY PRN
Status: DISCONTINUED | OUTPATIENT
Start: 2021-10-28 | End: 2021-10-30 | Stop reason: HOSPADM

## 2021-10-28 RX ADMIN — MULTIPLE VITAMINS W/ MINERALS TAB 1 TABLET: TAB at 08:19

## 2021-10-28 RX ADMIN — POLYETHYLENE GLYCOL 3350 17 G: 17 POWDER, FOR SOLUTION ORAL at 13:39

## 2021-10-28 RX ADMIN — GABAPENTIN 300 MG: 300 CAPSULE ORAL at 21:38

## 2021-10-28 RX ADMIN — INSULIN LISPRO 2 UNITS: 100 INJECTION, SOLUTION INTRAVENOUS; SUBCUTANEOUS at 08:20

## 2021-10-28 RX ADMIN — BISACODYL 5 MG: 5 TABLET, COATED ORAL at 17:50

## 2021-10-28 RX ADMIN — LEVOTHYROXINE SODIUM 125 MCG: 75 TABLET ORAL at 06:48

## 2021-10-28 RX ADMIN — ROPINIROLE HYDROCHLORIDE 0.5 MG: 0.5 TABLET, FILM COATED ORAL at 21:38

## 2021-10-28 RX ADMIN — PRAVASTATIN SODIUM 40 MG: 20 TABLET ORAL at 21:38

## 2021-10-28 RX ADMIN — Medication 10 ML: at 21:39

## 2021-10-28 RX ADMIN — INSULIN LISPRO 2 UNITS: 100 INJECTION, SOLUTION INTRAVENOUS; SUBCUTANEOUS at 11:46

## 2021-10-28 RX ADMIN — Medication 10 ML: at 13:48

## 2021-10-28 RX ADMIN — INSULIN LISPRO 6 UNITS: 100 INJECTION, SOLUTION INTRAVENOUS; SUBCUTANEOUS at 21:55

## 2021-10-28 RX ADMIN — DOCUSATE SODIUM 100 MG: 100 CAPSULE, LIQUID FILLED ORAL at 08:20

## 2021-10-28 RX ADMIN — INSULIN LISPRO 4 UNITS: 100 INJECTION, SOLUTION INTRAVENOUS; SUBCUTANEOUS at 16:05

## 2021-10-28 RX ADMIN — DOCUSATE SODIUM 100 MG: 100 CAPSULE, LIQUID FILLED ORAL at 17:50

## 2021-10-28 RX ADMIN — ENOXAPARIN SODIUM 40 MG: 40 INJECTION SUBCUTANEOUS at 21:38

## 2021-10-28 RX ADMIN — GABAPENTIN 300 MG: 300 CAPSULE ORAL at 08:20

## 2021-10-28 RX ADMIN — Medication 10 ML: at 06:48

## 2021-10-28 RX ADMIN — GABAPENTIN 300 MG: 300 CAPSULE ORAL at 15:50

## 2021-10-28 NOTE — PROGRESS NOTES
ACUTE PHYSICAL THERAPY GOALS:  (Developed with and agreed upon by patient and/or caregiver.)  (Developed with and agreed upon by patient and/or caregiver.)  (1.) Kylie Carty will move from supine to sit and sit to supine , scoot up and down and roll side to side with MODIFIED INDEPENDENCE within 7 treatment day(s). (2.) Kylei Carty will transfer from bed to chair and chair to bed with MINIMAL ASSIST while maintaining NWB RLE using the least restrictive device within 7 treatment day(s). (3.) Kylie Carty will ambulate with MINIMAL ASSIST for 15 feet  while maintaining NWB RLE  with the least restrictive device within 7 treatment day(s). (4.) Kylie Carty will perform standing static and dynamic balance activities x 10 minutes with MINIMAL ASSIST  while maintaining NWB RLE  to improve safety within 7 treatment day(s). (5.) Kylie Carty will perform therapeutic exercises x 15 min for HEP with INDEPENDENCE to improve strength, endurance, and functional mobility within 7 treatment day(s).      PHYSICAL THERAPY: Daily Note and AM Treatment Day # 2    Kylie Carty is a 80 y.o. male   PRIMARY DIAGNOSIS: Ankle fracture, right  Closed trimalleolar fracture of right ankle, initial encounter [S82.851A]  Ankle fracture, right [S82.891A]  Procedure(s) (LRB):  RIGHT ANKLE OPEN REDUCTION INTERNAL FIXATION WITH POSS SYNDESMOTIC SCREW CHOICE ANES (Right)  2 Days Post-Op    ASSESSMENT:     REHAB RECOMMENDATIONS: CURRENT LEVEL OF FUNCTION:  (Most Recently Demonstrated)   Recommendation to date pending progress:  Setting:   Short-term Rehab  Equipment:    To Be Determined Bed Mobility:   Minimal Assistance  Sit to Stand:   Moderate Assistance x 2  Transfers:   Maximal Assistance x 2 to mod assist x 2  Gait/Mobility:   Unable to perform     ASSESSMENT:  Mr. Laurence Gitelman is a 80year old M who presents s/p RLE ankle ORIF after he sustained a R ankle fx when he slipped on some acorns. NWB RLE. Pt presents supine on contact, wanting to try using the [facility]. He performed bed mobility with min assist and then transferred to [facility] mod/max assist x 2. He then transferred to chair using rolling walker and mod/max assist x 2. He does fairly well maintaining NWB, he does fatigue easily. He was left up in chair set up for lunch. He is making progress towards goals. Will continue with POC. SUBJECTIVE:   Mr. Keshia Bacon states, \"This is difficult to do. \"    SOCIAL HISTORY/ LIVING ENVIRONMENT:  Lives with his spouse (who is in poor health), typically active and independent at baseline. 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 Lives in a one story home, states there are stairs but also a ramp he can use to enter.    Home Environment: Private residence  One/Two Story Residence: One story  Living Alone: No  Support Systems: Other Family Member(s), Child(nathaly), Spouse/Significant Other  OBJECTIVE:     PAIN: VITAL SIGNS: LINES/DRAINS:   Pre Treatment:    Post Treatment: 4/10   None  O2 Device: None (Room air)     MOBILITY: I Mod I S SBA CGA Min Mod Max Total  NT x2 Comments:   Bed Mobility    Rolling [] [] [] [] [] [x] [] [] [] [] []    Supine to Sit [] [] [] [] [] [x] [] [] [] [] []    Scooting [] [] [] [] [] [x] [] [] [] [] []    Sit to Supine [] [] [] [] [] [x] [] [] [] [] []    Transfers    Sit to Stand [] [] [] [] [] [] [x] [] [] [] [x]    Bed to Chair [] [] [] [] [] [] [x] [x] [] [] [x]    Stand to Sit [] [] [] [] [] [] [x] [x] [] [] [x]    I=Independent, Mod I=Modified Independent, S=Supervision, SBA=Standby Assistance, CGA=Contact Guard Assistance,   Min=Minimal Assistance, Mod=Moderate Assistance, Max=Maximal Assistance, Total=Total Assistance, NT=Not Tested    BALANCE: Good Fair+ Fair Fair- Poor NT Comments   Sitting Static [x] [] [] [] [] []    Sitting Dynamic [x] [] [] [] [] []              Standing Static [] [] [] [x] [x] []    Standing Dynamic [] [] [] [] [x] []      GAIT: I Mod I S SBA CGA Min Mod Max Total  NT x2 Comments:   Level of Assistance [] [] [] [] [] [] [] [] [] [x] []    Distance N/A    DME Rolling Walker and Gait Belt    Gait Quality N/A    Weightbearing  Status NWB RLE    I=Independent, Mod I=Modified Independent, S=Supervision, SBA=Standby Assistance, CGA=Contact Guard Assistance,   Min=Minimal Assistance, Mod=Moderate Assistance, Max=Maximal Assistance, Total=Total Assistance, NT=Not Tested    PLAN:   FREQUENCY/DURATION: PT Plan of Care: BID for duration of hospital stay or until stated goals are met, whichever comes first.  TREATMENT:     TREATMENT:   ($$ Therapeutic Activity: 23-37 mins    )  Therapeutic Activity (30 Minutes): Therapeutic activity included Supine to Sit, Scooting, Transfer Training, Sitting balance  and Standing balance to improve functional Mobility, Strength, ROM and Activity tolerance.     TREATMENT GRID:  N/A    AFTER TREATMENT POSITION/PRECAUTIONS:  Chair, Needs within reach and RN notified    INTERDISCIPLINARY COLLABORATION:  RN/PCT, PT/PTA and Rehab Attendant     TOTAL TREATMENT DURATION:  PT Patient Time In/Time Out  Time In: 1056  Time Out: CLARA Sylvester 41, PTA

## 2021-10-28 NOTE — PROGRESS NOTES
ACUTE PHYSICAL THERAPY GOALS:  (Developed with and agreed upon by patient and/or caregiver.)  (Developed with and agreed upon by patient and/or caregiver.)  (1.) Isis Rosas will move from supine to sit and sit to supine , scoot up and down and roll side to side with MODIFIED INDEPENDENCE within 7 treatment day(s). (2.) Isis Rosas will transfer from bed to chair and chair to bed with MINIMAL ASSIST while maintaining NWB RLE using the least restrictive device within 7 treatment day(s). (3.) Isis Rosas will ambulate with MINIMAL ASSIST for 15 feet  while maintaining NWB RLE  with the least restrictive device within 7 treatment day(s). (4.) Isis Rosas will perform standing static and dynamic balance activities x 10 minutes with MINIMAL ASSIST  while maintaining NWB RLE  to improve safety within 7 treatment day(s). (5.) Isis Rosas will perform therapeutic exercises x 15 min for HEP with INDEPENDENCE to improve strength, endurance, and functional mobility within 7 treatment day(s).      PHYSICAL THERAPY: Daily Note and PM Treatment Day # 2    Isis Rosas is a 80 y.o. male   PRIMARY DIAGNOSIS: Ankle fracture, right  Closed trimalleolar fracture of right ankle, initial encounter [S82.851A]  Ankle fracture, right [S82.891A]  Procedure(s) (LRB):  RIGHT ANKLE OPEN REDUCTION INTERNAL FIXATION WITH POSS SYNDESMOTIC SCREW CHOICE ANES (Right)  2 Days Post-Op    ASSESSMENT:     REHAB RECOMMENDATIONS: CURRENT LEVEL OF FUNCTION:  (Most Recently Demonstrated)   Recommendation to date pending progress:  Setting:   Short-term Rehab  Equipment:    To Be Determined Bed Mobility:   Minimal Assistance  Sit to Stand:   Moderate Assistance x 2  Transfers:   Moderate Assistance x 2  Gait/Mobility:   Unable to perform     ASSESSMENT:  Mr. Addie Owens is a 80year old M who presents s/p RLE ankle ORIF after he sustained a R ankle fx when he slipped on some acorns. NWB RLE.      Pt presents sitting up in chair and wanting to get back to bed. He again transfers using rolling walker and mod assist x 2 while maintaining NWB fairly well. Cues for walker management, pushing thru UEs, guidance, and safety awareness. He returned supine and was left with needs in reach. Fatigues easily. He is making progress towards goals. Will continue with POC. SUBJECTIVE:   Mr. Johann Connor states, \"Am I there? .\"    SOCIAL HISTORY/ LIVING ENVIRONMENT:  Lives with his spouse (who is in poor health), typically active and independent at baseline. 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 Lives in a one story home, states there are stairs but also a ramp he can use to enter.    Home Environment: Private residence  One/Two Story Residence: One story  Living Alone: No  Support Systems: Other Family Member(s), Child(nathaly), Spouse/Significant Other  OBJECTIVE:     PAIN: VITAL SIGNS: LINES/DRAINS:   Pre Treatment:    Post Treatment: 4/10   None  O2 Device: None (Room air)     MOBILITY: I Mod I S SBA CGA Min Mod Max Total  NT x2 Comments:   Bed Mobility    Rolling [] [] [] [] [] [x] [] [] [] [] []    Supine to Sit [] [] [] [] [] [x] [] [] [] [] []    Scooting [] [] [] [] [] [x] [] [] [] [] []    Sit to Supine [] [] [] [] [] [x] [] [] [] [] []    Transfers    Sit to Stand [] [] [] [] [] [] [x] [] [] [] [x]    Bed to Chair [] [] [] [] [] [] [x] [] [] [] [x]    Stand to Sit [] [] [] [] [] [] [x] [] [] [] [x]    I=Independent, Mod I=Modified Independent, S=Supervision, SBA=Standby Assistance, CGA=Contact Guard Assistance,   Min=Minimal Assistance, Mod=Moderate Assistance, Max=Maximal Assistance, Total=Total Assistance, NT=Not Tested    BALANCE: Good Fair+ Fair Fair- Poor NT Comments   Sitting Static [x] [] [] [] [] []    Sitting Dynamic [x] [] [] [] [] []              Standing Static [] [] [] [x] [x] []    Standing Dynamic [] [] [] [] [x] []      GAIT: I Mod I S SBA CGA Min Mod Max Total  NT x2 Comments:   Level of Assistance [] [] [] [] [] [] [] [] [] [x] []    Distance N/A    DME Rolling Walker and Gait Belt    Gait Quality N/A    Weightbearing  Status NWB RLE    I=Independent, Mod I=Modified Independent, S=Supervision, SBA=Standby Assistance, CGA=Contact Guard Assistance,   Min=Minimal Assistance, Mod=Moderate Assistance, Max=Maximal Assistance, Total=Total Assistance, NT=Not Tested    PLAN:   FREQUENCY/DURATION: PT Plan of Care: BID for duration of hospital stay or until stated goals are met, whichever comes first.  TREATMENT:     TREATMENT:   ($$ Therapeutic Activity: 8-22 mins    )  Therapeutic Activity (13 Minutes): Therapeutic activity included Sit to Supine, Scooting, Transfer Training, Sitting balance  and Standing balance to improve functional Mobility, Strength, ROM and Activity tolerance.     TREATMENT GRID:  N/A    AFTER TREATMENT POSITION/PRECAUTIONS:  Bed, Needs within reach and RN notified    INTERDISCIPLINARY COLLABORATION:  RN/PCT, PT/PTA and Rehab Attendant     TOTAL TREATMENT DURATION:  PT Patient Time In/Time Out  Time In: 1400  Time Out: LEONARDO Ibarra

## 2021-10-28 NOTE — PROGRESS NOTES
Hospitalist Progress Note   Admit Date:  10/26/2021  7:29 AM   Name:  Mikal Garcia   Age:  80 y.o. Sex:  male  :  1940   MRN:  [id number]   Room:  [facility]/    Presenting Complaint: No chief complaint on file. Reason(s) for Admission: Closed trimalleolar fracture of right ankle, initial encounter [S82.806B]  Ankle fracture, right [facility] Course & Interval History:       Mr. Francesca Cerda is a 79 yo male with PMH of DM2, HTN admitted to ortho s/p closed trimalleolar fracture of right ankle. He is s/p repair. Discharge plans pending. Subjective (10/28/21):     In bed, eating ok, no BM, some pain, no dyspnea or cough    Assessment & Plan:     Principal Problem:    Ankle fracture, right (10/26/2021)  ·   Postop per orthopedics   · PT,OT  · Pending SNF      Active Problems:    Acquired hypothyroidism (10/17/2016)  ·   Continued synthroid        Hypertension, essential, benign (10/17/2016)  · Continued lisinopril- HCTZ  As tolerant, held 10-28 as lower BP ranges        Type 2 diabetes mellitus with hyperglycemia, without long-term current use of insulin ([facility] [address] (10/26/2021)  · Continued sliding scale coverage   · Holding actos, metformin   · continued jardiance   · A1C 7.3        Thrombocytopenia:  · Trend CBC- has chronic trend        Constipation:  · Colace and miralax       Dispo/Discharge Planning:    Pending     Diet:  ADULT DIET Regular; 4 carb choices (60 gm/meal)  DVT PPx: lovenox  Code status: Full Code    Hospital Problems as of 10/28/2021 Date Reviewed: 3/2/2021        Codes Class Noted - Resolved POA    Type 2 diabetes mellitus with hyperglycemia, without long-term current use of insulin (HCC) ICD-10-CM: E11.65  ICD-9-CM: 250.00, 790.29  10/26/2021 - Present Yes        * (Principal) Ankle fracture, right ICD-10-CM: W92.339F  ICD-9-CM: 824.8  10/26/2021 - Present Yes        Acquired hypothyroidism (Chronic) ICD-10-CM: E03.9  ICD-9-CM: 244.9  10/17/2016 - Present Yes Hypertension, essential, benign (Chronic) ICD-10-CM: I10  ICD-9-CM: 401.1  10/17/2016 - Present Yes        Diabetic neuropathy, painful (HCC) (Chronic) ICD-10-CM: E11.40  ICD-9-CM: 250.60, 357.2  10/17/2016 - Present Yes              Objective:     Patient Vitals for the past 24 hrs:   Temp Pulse Resp BP SpO2   10/28/21 1052 97.4 °F (36.3 °C) 64 20 116/68 95 %   10/28/21 0747 97.8 °F (36.6 °C) 72 20 96/60 94 %   10/28/21 0419 98.5 °F (36.9 °C) 95 19 110/65 96 %   10/28/21 0027 98.2 °F (36.8 °C) 90 18 112/69 98 %   10/27/21 2023 97.9 °F (36.6 °C) 84 18 109/65 96 %   10/27/21 1523 97.3 °F (36.3 °C) 72 18 119/66 95 %     Oxygen Therapy  O2 Sat (%): 95 % (10/28/21 1052)  Pulse via Oximetry: 67 beats per minute (10/26/21 1231)  O2 Device: None (Room air) (10/27/21 1305)  O2 Flow Rate (L/min): 3 l/min (10/26/21 1158)    Estimated body mass index is 30.68 kg/m² as calculated from the following:    Height as of this encounter: 5' 11\" (1.803 m). Weight as of this encounter: 99.8 kg (220 lb). Intake/Output Summary (Last 24 hours) at 10/28/2021 1152  Last data filed at 10/28/2021 0654  Gross per 24 hour   Intake 900 ml   Output 950 ml   Net -50 ml         Physical Exam:     Blood pressure 116/68, pulse 64, temperature 97.4 °F (36.3 °C), resp. rate 20, height 5' 11\" (1.803 m), weight 99.8 kg (220 lb), SpO2 95 %. General:    Well nourished. No overt distress, pleasant   CV:   RRR. No m/r/g. No jugular venous distension. Lungs:   CTAB. No wheezing, rhonchi, or rales. Respirations even, unlabored- anterior   Abdomen: Bowel sounds present. Soft, nontender, nondistended. Extremities: No cyanosis or clubbing. No edema, RLE splinted  Skin:     No rashes and normal coloration. Warm and dry. Neuro:   grossly intact. Psych:  Normal mood and affect.       I have reviewed ordered lab tests and independently visualized imaging below:    Recent Labs:  Recent Results (from the past 48 hour(s))   GLUCOSE, POC Collection Time: 10/26/21  4:14 PM   Result Value Ref Range    Glucose (POC) 292 (H) 65 - 100 mg/dL    Performed by Codi    GLUCOSE, POC    Collection Time: 10/26/21  9:00 PM   Result Value Ref Range    Glucose (POC) 196 (H) 65 - 100 mg/dL    Performed by Kimber Hartmann    GLUCOSE, POC    Collection Time: 10/27/21  6:20 AM   Result Value Ref Range    Glucose (POC) 203 (H) 65 - 100 mg/dL    Performed by [facility]    CBC WITH AUTOMATED DIFF    Collection Time: 10/27/21  7:30 AM   Result Value Ref Range    WBC 5.6 4.3 - 11.1 K/uL    RBC 3.76 (L) 4.23 - 5.6 M/uL    HGB 12.9 (L) 13.6 - 17.2 g/dL    HCT 38.4 (L) 41.1 - 50.3 %    .1 (H) 79.6 - 97.8 FL    MCH 34.3 (H) 26.1 - 32.9 PG    MCHC 33.6 31.4 - 35.0 g/dL    RDW 13.9 11.9 - 14.6 %    PLATELET 87 (L) 121 - 450 K/uL    MPV 10.1 9.4 - 12.3 FL    ABSOLUTE NRBC 0.00 0.0 - 0.2 K/uL    DF AUTOMATED      NEUTROPHILS 69 43 - 78 %    LYMPHOCYTES 18 13 - 44 %    MONOCYTES 9 4.0 - 12.0 %    EOSINOPHILS 3 0.5 - 7.8 %    BASOPHILS 1 0.0 - 2.0 %    IMMATURE GRANULOCYTES 1 0.0 - 5.0 %    ABS. NEUTROPHILS 3.9 1.7 - 8.2 K/UL    ABS. LYMPHOCYTES 1.0 0.5 - 4.6 K/UL    ABS. MONOCYTES 0.5 0.1 - 1.3 K/UL    ABS. EOSINOPHILS 0.2 0.0 - 0.8 K/UL    ABS. BASOPHILS 0.0 0.0 - 0.2 K/UL    ABS. IMM.  GRANS. 0.0 0.0 - 0.5 K/UL   METABOLIC PANEL, BASIC    Collection Time: 10/27/21  7:30 AM   Result Value Ref Range    Sodium 135 (L) 138 - 145 mmol/L    Potassium 4.9 3.5 - 5.1 mmol/L    Chloride 103 98 - 107 mmol/L    CO2 27 21 - 32 mmol/L    Anion gap 5 (L) 7 - 16 mmol/L    Glucose 181 (H) 65 - 100 mg/dL    BUN 35 (H) 8 - 23 MG/DL    Creatinine 1.19 0.8 - 1.5 MG/DL    GFR est AA >60 >60 ml/min/1.73m2    GFR est non-AA >60 >60 ml/min/1.73m2    Calcium 8.9 8.3 - 10.4 MG/DL   HEMOGLOBIN A1C WITH EAG    Collection Time: 10/27/21  7:36 AM   Result Value Ref Range    Hemoglobin A1c 7.3 (H) 4.2 - 6.3 %    Est. average glucose 163 mg/dL   GLUCOSE, POC    Collection Time: 10/27/21 10:35 AM   Result Value Ref Range    Glucose (POC) 237 (H) 65 - 100 mg/dL    Performed by [email or web address] Masoud Pictorama PPD TEST IN 24 HRS    Collection Time: 10/27/21  3:52 PM   Result Value Ref Range    PPD Negative Negative    mm Induration 0 0 - 5 mm   GLUCOSE, POC    Collection Time: 10/27/21  4:16 PM   Result Value Ref Range    Glucose (POC) 169 (H) 65 - 100 mg/dL    Performed by Humza    GLUCOSE, POC    Collection Time: 10/27/21  9:15 PM   Result Value Ref Range    Glucose (POC) 250 (H) 65 - 100 mg/dL    Performed by Manuela Bernal    GLUCOSE, POC    Collection Time: 10/28/21  6:54 AM   Result Value Ref Range    Glucose (POC) 188 (H) 65 - 100 mg/dL    Performed by Kristi [email or web address], POC    Collection Time: 10/28/21 10:50 AM   Result Value Ref Range    Glucose (POC) 181 (H) 65 - 100 mg/dL    Performed by Alex        All Micro Results     Procedure Component Value Units Date/Time    COVID-19 RAPID TEST [[id number]] Collected: 10/26/21 0944    Order Status: Completed Specimen: Nasopharyngeal Updated: 10/26/21 1048     Specimen source NASAL        Comment: RAPID ONLY        COVID-19 rapid test Not detected        Comment:      The specimen is NEGATIVE for SARS-CoV-2, the novel coronavirus associated with COVID-19. A negative result does not rule out COVID-19. This test has been authorized by the FDA under an Emergency Use Authorization (EUA) for use by authorized laboratories. Fact sheet for Healthcare Providers: ConventionUpdate.co.nz  Fact sheet for Patients: ConventionUpdate.co.nz       Methodology: Isothermal Nucleic Acid Amplification               Other Studies:  No results found.     Current Meds:  Current Facility-Administered Medications   Medication Dose Route Frequency    influenza vaccine 2021-22 (6 mos+)(PF) (FLUARIX/FLULAVAL/FLUZONE QUAD) injection 0.5 mL  1 Each IntraMUSCular PRIOR TO DISCHARGE    meclizine (ANTIVERT) tablet 25 mg  25 mg Oral Q6H PRN    docusate sodium (COLACE) capsule 100 mg  100 mg Oral BID    polyethylene glycol (MIRALAX) packet 17 g  17 g Oral DAILY PRN    sodium chloride (NS) flush 5-40 mL  5-40 mL IntraVENous PRN    empagliflozin (JARDIANCE) tablet 25 mg (Patient Supplied)  25 mg Oral DAILY    multivitamin, tx-iron-ca-min (THERA-M w/ IRON) tablet 1 Tablet  1 Tablet Oral DAILY    gabapentin (NEURONTIN) capsule 300 mg  300 mg Oral TID    levothyroxine (SYNTHROID) tablet 125 mcg  125 mcg Oral ACB    lisinopril-hydroCHLOROthiazide (PRINZIDE, ZESTORETIC) 20-12.5 mg per tablet 1 Tablet  1 Tablet Oral DAILY    [Held by provider] metFORMIN ER (GLUCOPHAGE XR) tablet 1,000 mg  1,000 mg Oral ACB&D    [Held by provider] pioglitazone (ACTOS) tablet 30 mg  30 mg Oral 7am    pravastatin (PRAVACHOL) tablet 40 mg  40 mg Oral QHS    rOPINIRole (REQUIP) tablet 0.5 mg  0.5 mg Oral QHS    sodium chloride (NS) flush 5-40 mL  5-40 mL IntraVENous Q8H    sodium chloride (NS) flush 5-40 mL  5-40 mL IntraVENous PRN    acetaminophen (TYLENOL) tablet 650 mg  650 mg Oral Q4H PRN    oxyCODONE IR (ROXICODONE) tablet 5 mg  5 mg Oral Q4H PRN    HYDROmorphone (DILAUDID) injection 1 mg  1 mg IntraVENous Q4H PRN    enoxaparin (LOVENOX) injection 40 mg  40 mg SubCUTAneous Q24H    insulin lispro (HUMALOG) injection   SubCUTAneous AC&HS    dextrose 40% (GLUTOSE) oral gel 1 Tube  15 g Oral PRN    glucagon (GLUCAGEN) injection 1 mg  1 mg IntraMUSCular PRN    dextrose (D50W) injection syrg 12.5-25 g  25-50 mL IntraVENous PRN       Signed:  Deandra Scott MD    Part of this note may have been written by using a voice dictation software. The note has been proof read but may still contain some grammatical/other typographical errors.

## 2021-10-28 NOTE — PROGRESS NOTES
Problem: Falls - Risk of  Goal: *Absence of Falls  Description: Document Marques Tam Fall Risk and appropriate interventions in the flowsheet. Outcome: Progressing Towards Goal  Note: Fall Risk Interventions:  Mobility Interventions: Patient to call before getting OOB         Medication Interventions: Bed/chair exit alarm    Elimination Interventions: Bed/chair exit alarm, Call light in reach    History of Falls Interventions: Bed/chair exit alarm         Problem: Patient Education: Go to Patient Education Activity  Goal: Patient/Family Education  Outcome: Progressing Towards Goal     Problem: Diabetes Self-Management  Goal: *Disease process and treatment process  Description: Define diabetes and identify own type of diabetes; list 3 options for treating diabetes. Outcome: Progressing Towards Goal  Goal: *Incorporating nutritional management into lifestyle  Description: Describe effect of type, amount and timing of food on blood glucose; list 3 methods for planning meals. Outcome: Progressing Towards Goal  Goal: *Incorporating physical activity into lifestyle  Description: State effect of exercise on blood glucose levels. Outcome: Progressing Towards Goal  Goal: *Developing strategies to promote health/change behavior  Description: Define the ABC's of diabetes; identify appropriate screenings, schedule and personal plan for screenings. Outcome: Progressing Towards Goal  Goal: *Using medications safely  Description: State effect of diabetes medications on diabetes; name diabetes medication taking, action and side effects. Outcome: Progressing Towards Goal  Goal: *Monitoring blood glucose, interpreting and using results  Description: Identify recommended blood glucose targets  and personal targets.   Outcome: Progressing Towards Goal  Goal: *Prevention, detection, treatment of acute complications  Description: List symptoms of hyper- and hypoglycemia; describe how to treat low blood sugar and actions for lowering high blood glucose level. Outcome: Progressing Towards Goal  Goal: *Prevention, detection and treatment of chronic complications  Description: Define the natural course of diabetes and describe the relationship of blood glucose levels to long term complications of diabetes.   Outcome: Progressing Towards Goal  Goal: *Developing strategies to address psychosocial issues  Description: Describe feelings about living with diabetes; identify support needed and support network  Outcome: Progressing Towards Goal  Goal: *Insulin pump training  Outcome: Progressing Towards Goal  Goal: *Sick day guidelines  Outcome: Progressing Towards Goal  Goal: *Patient Specific Goal (EDIT GOAL, INSERT TEXT)  Outcome: Progressing Towards Goal     Problem: Patient Education: Go to Patient Education Activity  Goal: Patient/Family Education  Outcome: Progressing Towards Goal     Problem: Patient Education: Go to Patient Education Activity  Goal: Patient/Family Education  Outcome: Progressing Towards Goal     Problem: Patient Education: Go to Patient Education Activity  Goal: Patient/Family Education  Outcome: Progressing Towards Goal

## 2021-10-28 NOTE — PROGRESS NOTES
[facility] FRACTURE PROGRESS NOTE    2021  Admit Date:   10/26/2021    Post Op day: 2 Days Post-Op    Subjective:    Kb Boland Is alert in bed. He reports pain being under control and has not had any N/V, SOB or CP. Reports occasional pain as feeling like zingers shooting proximally towards his knee. He is ready to go to rehab.     PT/OT:   Gait:   NWB                  Vital Signs:    Patient Vitals for the past 8 hrs:   BP Temp Pulse Resp SpO2   10/28/21 1052 116/68 97.4 °F (36.3 °C) 64 20 95 %   10/28/21 0747 96/60 97.8 °F (36.6 °C) 72 20 94 %     Temp (24hrs), Av.9 °F (36.6 °C), Min:97.3 °F (36.3 °C), Max:98.5 °F (36.9 °C)      Pain Control:   Pain Assessment  Pain Scale 1: Numeric (0 - 10)  Pain Intensity 1: 0  Pain Onset 1: postop  Pain Location 1: Ankle  Pain Orientation 1: Right  Pain Description 1: Aching  Pain Intervention(s) 1: Declines    Meds:    Current Facility-Administered Medications   Medication Dose Route Frequency    bisacodyL (DULCOLAX) suppository 10 mg  10 mg Rectal DAILY PRN    bisacodyL (DULCOLAX) tablet 5 mg  5 mg Oral DAILY PRN    influenza vaccine  (6 mos+)(PF) (FLUARIX/FLULAVAL/FLUZONE QUAD) injection 0.5 mL  1 Each IntraMUSCular PRIOR TO DISCHARGE    meclizine (ANTIVERT) tablet 25 mg  25 mg Oral Q6H PRN    docusate sodium (COLACE) capsule 100 mg  100 mg Oral BID    polyethylene glycol (MIRALAX) packet 17 g  17 g Oral DAILY PRN    sodium chloride (NS) flush 5-40 mL  5-40 mL IntraVENous PRN    empagliflozin (JARDIANCE) tablet 25 mg (Patient Supplied)  25 mg Oral DAILY    multivitamin, tx-iron-ca-min (THERA-M w/ IRON) tablet 1 Tablet  1 Tablet Oral DAILY    gabapentin (NEURONTIN) capsule 300 mg  300 mg Oral TID    levothyroxine (SYNTHROID) tablet 125 mcg  125 mcg Oral ACB    [Held by provider] lisinopril-hydroCHLOROthiazide (PRINZIDE, ZESTORETIC) 20-12.5 mg per tablet 1 Tablet  1 Tablet Oral DAILY    [Held by provider] metFORMIN ER (GLUCOPHAGE XR) tablet 1,000 mg  1,000 mg Oral ACB&D    [Held by provider] pioglitazone (ACTOS) tablet 30 mg  30 mg Oral 7am    pravastatin (PRAVACHOL) tablet 40 mg  40 mg Oral QHS    rOPINIRole (REQUIP) tablet 0.5 mg  0.5 mg Oral QHS    sodium chloride (NS) flush 5-40 mL  5-40 mL IntraVENous Q8H    sodium chloride (NS) flush 5-40 mL  5-40 mL IntraVENous PRN    acetaminophen (TYLENOL) tablet 650 mg  650 mg Oral Q4H PRN    oxyCODONE IR (ROXICODONE) tablet 5 mg  5 mg Oral Q4H PRN    HYDROmorphone (DILAUDID) injection 1 mg  1 mg IntraVENous Q4H PRN    enoxaparin (LOVENOX) injection 40 mg  40 mg SubCUTAneous Q24H    insulin lispro (HUMALOG) injection   SubCUTAneous AC&HS    dextrose 40% (GLUTOSE) oral gel 1 Tube  15 g Oral PRN    glucagon (GLUCAGEN) injection 1 mg  1 mg IntraMUSCular PRN    dextrose (D50W) injection syrg 12.5-25 g  25-50 mL IntraVENous PRN       LAB:    Recent Labs     10/27/21  0730   HCT 38.4*   HGB 12.9*       24 Hour Assessment Issues:    Oriented    Discharge Planning: SNF    Assessment & Physician's Comment:  Neurovascular checks within normal limits, dressing with splint is clean, dry and intact. He denies SOB, CP, or N/V. Pain is well controlled at this time. We discussed SNF placement and expectations for recovery. Principal Problem:    Ankle fracture, right (10/26/2021)    Active Problems:    Acquired hypothyroidism (10/17/2016)      Hypertension, essential, benign (10/17/2016)      Diabetic neuropathy, painful ([facility] [address] (10/17/2016)      Type 2 diabetes mellitus with hyperglycemia, without long-term current use of insulin ([facility] [address] (10/26/2021)        Plan:  Pending SNF  Hospitalist managing medically. Continue PT BID- NWB status.    Pain control    Francoise Fitzgerald NP

## 2021-10-29 LAB
ANION GAP SERPL CALC-SCNC: 6 MMOL/L (ref 7–16)
BASOPHILS # BLD: 0 K/UL (ref 0–0.2)
BASOPHILS NFR BLD: 1 % (ref 0–2)
BUN SERPL-MCNC: 34 MG/DL (ref 8–23)
CALCIUM SERPL-MCNC: 8.9 MG/DL (ref 8.3–10.4)
CHLORIDE SERPL-SCNC: 102 MMOL/L (ref 98–107)
CO2 SERPL-SCNC: 25 MMOL/L (ref 21–32)
CREAT SERPL-MCNC: 1.1 MG/DL (ref 0.8–1.5)
DIFFERENTIAL METHOD BLD: ABNORMAL
EOSINOPHIL # BLD: 0.2 K/UL (ref 0–0.8)
EOSINOPHIL NFR BLD: 3 % (ref 0.5–7.8)
ERYTHROCYTE [DISTWIDTH] IN BLOOD BY AUTOMATED COUNT: 13.2 % (ref 11.9–14.6)
GLUCOSE BLD STRIP.AUTO-MCNC: 202 MG/DL (ref 65–100)
GLUCOSE BLD STRIP.AUTO-MCNC: 243 MG/DL (ref 65–100)
GLUCOSE BLD STRIP.AUTO-MCNC: 267 MG/DL (ref 65–100)
GLUCOSE BLD STRIP.AUTO-MCNC: 279 MG/DL (ref 65–100)
GLUCOSE SERPL-MCNC: 224 MG/DL (ref 65–100)
HCT VFR BLD AUTO: 37.9 % (ref 41.1–50.3)
HGB BLD-MCNC: 12.7 G/DL (ref 13.6–17.2)
IMM GRANULOCYTES # BLD AUTO: 0 K/UL (ref 0–0.5)
IMM GRANULOCYTES NFR BLD AUTO: 1 % (ref 0–5)
LYMPHOCYTES # BLD: 1.2 K/UL (ref 0.5–4.6)
LYMPHOCYTES NFR BLD: 23 % (ref 13–44)
MCH RBC QN AUTO: 32.8 PG (ref 26.1–32.9)
MCHC RBC AUTO-ENTMCNC: 33.5 G/DL (ref 31.4–35)
MCV RBC AUTO: 97.9 FL (ref 79.6–97.8)
MONOCYTES # BLD: 0.5 K/UL (ref 0.1–1.3)
MONOCYTES NFR BLD: 10 % (ref 4–12)
NEUTS SEG # BLD: 3.2 K/UL (ref 1.7–8.2)
NEUTS SEG NFR BLD: 62 % (ref 43–78)
NRBC # BLD: 0 K/UL (ref 0–0.2)
PLATELET # BLD AUTO: 113 K/UL (ref 150–450)
PMV BLD AUTO: 9.9 FL (ref 9.4–12.3)
POTASSIUM SERPL-SCNC: 4.4 MMOL/L (ref 3.5–5.1)
RBC # BLD AUTO: 3.87 M/UL (ref 4.23–5.6)
SERVICE CMNT-IMP: ABNORMAL
SODIUM SERPL-SCNC: 133 MMOL/L (ref 138–145)
WBC # BLD AUTO: 5.2 K/UL (ref 4.3–11.1)

## 2021-10-29 PROCEDURE — 97530 THERAPEUTIC ACTIVITIES: CPT

## 2021-10-29 PROCEDURE — 65270000029 HC RM PRIVATE

## 2021-10-29 PROCEDURE — 36415 COLL VENOUS BLD VENIPUNCTURE: CPT

## 2021-10-29 PROCEDURE — 74011250637 HC RX REV CODE- 250/637: Performed by: INTERNAL MEDICINE

## 2021-10-29 PROCEDURE — 82962 GLUCOSE BLOOD TEST: CPT

## 2021-10-29 PROCEDURE — 74011636637 HC RX REV CODE- 636/637: Performed by: STUDENT IN AN ORGANIZED HEALTH CARE EDUCATION/TRAINING PROGRAM

## 2021-10-29 PROCEDURE — 74011250636 HC RX REV CODE- 250/636: Performed by: NURSE PRACTITIONER

## 2021-10-29 PROCEDURE — 80048 BASIC METABOLIC PNL TOTAL CA: CPT

## 2021-10-29 PROCEDURE — 74011636637 HC RX REV CODE- 636/637: Performed by: INTERNAL MEDICINE

## 2021-10-29 PROCEDURE — 74011250637 HC RX REV CODE- 250/637: Performed by: NURSE PRACTITIONER

## 2021-10-29 PROCEDURE — 85025 COMPLETE CBC W/AUTO DIFF WBC: CPT

## 2021-10-29 RX ORDER — OXYCODONE HYDROCHLORIDE 5 MG/1
5 TABLET ORAL
Qty: 20 TABLET | Refills: 0 | Status: SHIPPED | OUTPATIENT
Start: 2021-10-29 | End: 2021-11-03

## 2021-10-29 RX ORDER — INSULIN GLARGINE 100 [IU]/ML
20 INJECTION, SOLUTION SUBCUTANEOUS
Status: DISCONTINUED | OUTPATIENT
Start: 2021-10-29 | End: 2021-10-30 | Stop reason: HOSPADM

## 2021-10-29 RX ORDER — ENOXAPARIN SODIUM 100 MG/ML
40 INJECTION SUBCUTANEOUS DAILY
Qty: 0.4 ML | Refills: 42 | Status: SHIPPED | OUTPATIENT
Start: 2021-10-29

## 2021-10-29 RX ADMIN — Medication 10 ML: at 14:54

## 2021-10-29 RX ADMIN — INSULIN LISPRO 6 UNITS: 100 INJECTION, SOLUTION INTRAVENOUS; SUBCUTANEOUS at 12:53

## 2021-10-29 RX ADMIN — ACETAMINOPHEN 650 MG: 325 TABLET ORAL at 09:34

## 2021-10-29 RX ADMIN — LEVOTHYROXINE SODIUM 125 MCG: 75 TABLET ORAL at 06:03

## 2021-10-29 RX ADMIN — ENOXAPARIN SODIUM 40 MG: 40 INJECTION SUBCUTANEOUS at 21:15

## 2021-10-29 RX ADMIN — INSULIN GLARGINE 20 UNITS: 100 INJECTION, SOLUTION SUBCUTANEOUS at 21:26

## 2021-10-29 RX ADMIN — ACETAMINOPHEN 650 MG: 325 TABLET ORAL at 21:15

## 2021-10-29 RX ADMIN — Medication 10 ML: at 21:15

## 2021-10-29 RX ADMIN — GABAPENTIN 300 MG: 300 CAPSULE ORAL at 21:15

## 2021-10-29 RX ADMIN — DOCUSATE SODIUM 100 MG: 100 CAPSULE, LIQUID FILLED ORAL at 17:25

## 2021-10-29 RX ADMIN — Medication 10 ML: at 06:03

## 2021-10-29 RX ADMIN — INSULIN LISPRO 4 UNITS: 100 INJECTION, SOLUTION INTRAVENOUS; SUBCUTANEOUS at 17:25

## 2021-10-29 RX ADMIN — GABAPENTIN 300 MG: 300 CAPSULE ORAL at 17:25

## 2021-10-29 RX ADMIN — GABAPENTIN 300 MG: 300 CAPSULE ORAL at 09:34

## 2021-10-29 RX ADMIN — DOCUSATE SODIUM 100 MG: 100 CAPSULE, LIQUID FILLED ORAL at 09:34

## 2021-10-29 RX ADMIN — INSULIN LISPRO 2 UNITS: 100 INJECTION, SOLUTION INTRAVENOUS; SUBCUTANEOUS at 07:34

## 2021-10-29 RX ADMIN — MULTIPLE VITAMINS W/ MINERALS TAB 1 TABLET: TAB at 09:34

## 2021-10-29 RX ADMIN — INSULIN LISPRO 6 UNITS: 100 INJECTION, SOLUTION INTRAVENOUS; SUBCUTANEOUS at 21:26

## 2021-10-29 RX ADMIN — PRAVASTATIN SODIUM 40 MG: 20 TABLET ORAL at 21:15

## 2021-10-29 RX ADMIN — ROPINIROLE HYDROCHLORIDE 0.5 MG: 0.5 TABLET, FILM COATED ORAL at 21:15

## 2021-10-29 NOTE — PROGRESS NOTES
Hospitalist Progress Note   Admit Date:  10/26/2021  7:29 AM   Name:  Rakesh Lima   Age:  80 y.o. Sex:  male  :  1940   MRN:  [id number]   Room:  [facility]/    Presenting Complaint: No chief complaint on file. Reason(s) for Admission: Closed trimalleolar fracture of right ankle, initial encounter [S82.580J]  Ankle fracture, right [facility]-STEP LIV[facility] Course & Interval History:   Mr. Keshia Bacon is a 81 yo male with PMH of DM2, HTN admitted to ortho s/p closed trimalleolar fracture of right ankle. He is s/p repair. Subjective (10/29/21): Was seen and examined at the bedside. No overnight events. Patient was in bed resting comfortably. Patient has very mild pain. Patient denied any cardiac chest and, shortness breath, abdominal pain. Assessment & Plan:    Ankle fracture, right (10/26/2021)  Patient now status post ORIF for right ankle fracture  PT/OT  Activity per Ortho   physical therapy recommending rehab   SNF placement pending    Type 2 diabetes  Sliding scale insulin  ADA diet  Actos and Metformin on hold  Lantus 20 units daily  A1c 7.3%  Continue to monitor glucose daily    Diabetic neuropathy  Continue home gabapentin    Acquired hypothyroidism  Continue home Synthroid    Hypertension  Currently controlled  Continue lisinoprilHCTZ    Thrombocytopenia  Has chronic trend  Continue to monitor CBC    Constipation  Colace and MiraLAX    Hyperlipidemia  Continue home pravastatin      Dispo/Discharge Planning:    Dispo pending clinical course.   Patient waiting on SNF placement    Diet:  ADULT DIET Regular; 4 carb choices (60 gm/meal)  DVT PPx: Lovenox  Code status: Full Code    Hospital Problems as of 10/29/2021 Date Reviewed: 3/2/2021        Codes Class Noted - Resolved POA    Type 2 diabetes mellitus with hyperglycemia, without long-term current use of insulin (HCC) ICD-10-CM: E11.65  ICD-9-CM: 250.00, 790.29  10/26/2021 - Present Yes        * (Principal) Ankle fracture, right ICD-10-CM: K25.010G  ICD-9-CM: 824.8  10/26/2021 - Present Yes        Acquired hypothyroidism (Chronic) ICD-10-CM: E03.9  ICD-9-CM: 244.9  10/17/2016 - Present Yes        Hypertension, essential, benign (Chronic) ICD-10-CM: I10  ICD-9-CM: 401.1  10/17/2016 - Present Yes        Diabetic neuropathy, painful (HCC) (Chronic) ICD-10-CM: E11.40  ICD-9-CM: 250.60, 357.2  10/17/2016 - Present Yes              Objective:     Patient Vitals for the past 24 hrs:   Temp Pulse Resp BP SpO2   10/29/21 1122 97.9 °F (36.6 °C) 70 18 116/67 97 %   10/29/21 0747 97.9 °F (36.6 °C) 77 19 107/65 94 %   10/29/21 0405 98.3 °F (36.8 °C) 68 20 124/66 93 %   10/29/21 0006 98.3 °F (36.8 °C) 87 20 (!) 102/59 94 %   10/28/21 2004 97.9 °F (36.6 °C) 82 18 123/66 93 %   10/28/21 1545 98 °F (36.7 °C) 84 18 133/80 95 %     Oxygen Therapy  O2 Sat (%): 97 % (10/29/21 1122)  Pulse via Oximetry: 67 beats per minute (10/26/21 1231)  O2 Device: None (Room air) (10/29/21 0405)  O2 Flow Rate (L/min): 3 l/min (10/26/21 1158)    Estimated body mass index is 30.68 kg/m² as calculated from the following:    Height as of this encounter: 5' 11\" (1.803 m). Weight as of this encounter: 99.8 kg (220 lb). Intake/Output Summary (Last 24 hours) at 10/29/2021 1325  Last data filed at 10/29/2021 1223  Gross per 24 hour   Intake    Output 700 ml   Net -700 ml         Physical Exam:   Blood pressure 116/67, pulse 70, temperature 97.9 °F (36.6 °C), resp. rate 18, height 5' 11\" (1.803 m), weight 99.8 kg (220 lb), SpO2 97 %. General:    Well nourished. No overt distress  Head:  Normocephalic, atraumatic  Eyes:  Sclerae appear normal.  Pupils equally round. ENT:  Nares appear normal, no drainage. Moist oral mucosa  Neck:  No restricted ROM. Trachea midline   CV:   RRR. No m/r/g. No jugular venous distension. Lungs:   CTAB. No wheezing, rhonchi, or rales. Respirations even, unlabored  Abdomen: Bowel sounds present.   Soft, nontender, nondistended. Extremities: Right lower extremity splinted no cyanosis or clubbing. No edema  Skin:     No rashes and normal coloration. Warm and dry. Neuro:  CN II-XII grossly intact. Sensation intact. A&Ox3  Psych:  Normal mood and affect.       I have reviewed ordered lab tests and independently visualized imaging below:    Recent Labs:  Recent Results (from the past 48 hour(s))   PLEASE READ & DOCUMENT PPD TEST IN 24 HRS    Collection Time: 10/27/21  3:52 PM   Result Value Ref Range    PPD Negative Negative    mm Induration 0 0 - 5 mm   GLUCOSE, POC    Collection Time: 10/27/21  4:16 PM   Result Value Ref Range    Glucose (POC) 169 (H) 65 - 100 mg/dL    Performed by [address] [employer], POC    Collection Time: 10/27/21  9:15 PM   Result Value Ref Range    Glucose (POC) 250 (H) 65 - 100 mg/dL    Performed by [employer], POC    Collection Time: 10/28/21  6:54 AM   Result Value Ref Range    Glucose (POC) 188 (H) 65 - 100 mg/dL    Performed by Kristi Gordillo [address], POC    Collection Time: 10/28/21 10:50 AM   Result Value Ref Range    Glucose (POC) 181 (H) 65 - 100 mg/dL    Performed by HarrySTEVE    PLEASE READ & DOCUMENT PPD TEST IN 48 HRS    Collection Time: 10/28/21  3:12 PM   Result Value Ref Range    PPD Negative Negative    mm Induration 0 0 - 5 mm   GLUCOSE, POC    Collection Time: 10/28/21  3:53 PM   Result Value Ref Range    Glucose (POC) 204 (H) 65 - 100 mg/dL    Performed by Hanna Grace    GLUCOSE, POC    Collection Time: 10/28/21  9:48 PM   Result Value Ref Range    Glucose (POC) 258 (H) 65 - 100 mg/dL    Performed by [employer]    GLUCOSE, POC    Collection Time: 10/29/21  6:14 AM   Result Value Ref Range    Glucose (POC) 202 (H) 65 - 100 mg/dL    Performed by [employer]    CBC WITH AUTOMATED DIFF    Collection Time: 10/29/21  6:53 AM   Result Value Ref Range    WBC 5.2 4.3 - 11.1 K/uL    RBC 3.87 (L) 4.23 - 5.6 M/uL    HGB 12.7 (L) 13.6 - 17.2 g/dL    HCT 37.9 (L) 41.1 - 50.3 %    MCV 97.9 (H) 79.6 - 97.8 FL    MCH 32.8 26.1 - 32.9 PG    MCHC 33.5 31.4 - 35.0 g/dL    RDW 13.2 11.9 - 14.6 %    PLATELET 922 (L) 371 - 450 K/uL    MPV 9.9 9.4 - 12.3 FL    ABSOLUTE NRBC 0.00 0.0 - 0.2 K/uL    DF AUTOMATED      NEUTROPHILS 62 43 - 78 %    LYMPHOCYTES 23 13 - 44 %    MONOCYTES 10 4.0 - 12.0 %    EOSINOPHILS 3 0.5 - 7.8 %    BASOPHILS 1 0.0 - 2.0 %    IMMATURE GRANULOCYTES 1 0.0 - 5.0 %    ABS. NEUTROPHILS 3.2 1.7 - 8.2 K/UL    ABS. LYMPHOCYTES 1.2 0.5 - 4.6 K/UL    ABS. MONOCYTES 0.5 0.1 - 1.3 K/UL    ABS. EOSINOPHILS 0.2 0.0 - 0.8 K/UL    ABS. BASOPHILS 0.0 0.0 - 0.2 K/UL    ABS. IMM. GRANS. 0.0 0.0 - 0.5 K/UL   METABOLIC PANEL, BASIC    Collection Time: 10/29/21  6:53 AM   Result Value Ref Range    Sodium 133 (L) 138 - 145 mmol/L    Potassium 4.4 3.5 - 5.1 mmol/L    Chloride 102 98 - 107 mmol/L    CO2 25 21 - 32 mmol/L    Anion gap 6 (L) 7 - 16 mmol/L    Glucose 224 (H) 65 - 100 mg/dL    BUN 34 (H) 8 - 23 MG/DL    Creatinine 1.10 0.8 - 1.5 MG/DL    GFR est AA >60 >60 ml/min/1.73m2    GFR est non-AA >60 >60 ml/min/1.73m2    Calcium 8.9 8.3 - 10.4 MG/DL   GLUCOSE, POC    Collection Time: 10/29/21 11:14 AM   Result Value Ref Range    Glucose (POC) 279 (H) 65 - 100 mg/dL    Performed by Tana Melchor        All Micro Results     Procedure Component Value Units Date/Time    COVID-19 RAPID TEST [[id number]] Collected: 10/26/21 0944    Order Status: Completed Specimen: Nasopharyngeal Updated: 10/26/21 1046     Specimen source NASAL        Comment: RAPID ONLY        COVID-19 rapid test Not detected        Comment:      The specimen is NEGATIVE for SARS-CoV-2, the novel coronavirus associated with COVID-19. A negative result does not rule out COVID-19. This test has been authorized by the FDA under an Emergency Use Authorization (EUA) for use by authorized laboratories.         Fact sheet for Healthcare Providers: iTendency.uy  Fact sheet for Patients: [facility].co.       Methodology: Isothermal Nucleic Acid Amplification               Other Studies:  No results found.     Current Meds:  Current Facility-Administered Medications   Medication Dose Route Frequency    insulin glargine (LANTUS) injection 20 Units  20 Units SubCUTAneous QHS    bisacodyL (DULCOLAX) suppository 10 mg  10 mg Rectal DAILY PRN    bisacodyL (DULCOLAX) tablet 5 mg  5 mg Oral DAILY PRN    influenza vaccine 2021-22 (6 mos+)(PF) (FLUARIX/FLULAVAL/FLUZONE QUAD) injection 0.5 mL  1 Each IntraMUSCular PRIOR TO DISCHARGE    meclizine (ANTIVERT) tablet 25 mg  25 mg Oral Q6H PRN    docusate sodium (COLACE) capsule 100 mg  100 mg Oral BID    polyethylene glycol (MIRALAX) packet 17 g  17 g Oral DAILY PRN    sodium chloride (NS) flush 5-40 mL  5-40 mL IntraVENous PRN    empagliflozin (JARDIANCE) tablet 25 mg (Patient Supplied)  25 mg Oral DAILY    multivitamin, tx-iron-ca-min (THERA-M w/ IRON) tablet 1 Tablet  1 Tablet Oral DAILY    gabapentin (NEURONTIN) capsule 300 mg  300 mg Oral TID    levothyroxine (SYNTHROID) tablet 125 mcg  125 mcg Oral ACB    [Held by provider] lisinopril-hydroCHLOROthiazide (PRINZIDE, ZESTORETIC) 20-12.5 mg per tablet 1 Tablet  1 Tablet Oral DAILY    [Held by provider] metFORMIN ER (GLUCOPHAGE XR) tablet 1,000 mg  1,000 mg Oral ACB&D    [Held by provider] pioglitazone (ACTOS) tablet 30 mg  30 mg Oral 7am    pravastatin (PRAVACHOL) tablet 40 mg  40 mg Oral QHS    rOPINIRole (REQUIP) tablet 0.5 mg  0.5 mg Oral QHS    sodium chloride (NS) flush 5-40 mL  5-40 mL IntraVENous Q8H    sodium chloride (NS) flush 5-40 mL  5-40 mL IntraVENous PRN    acetaminophen (TYLENOL) tablet 650 mg  650 mg Oral Q4H PRN    oxyCODONE IR (ROXICODONE) tablet 5 mg  5 mg Oral Q4H PRN    HYDROmorphone (DILAUDID) injection 1 mg  1 mg IntraVENous Q4H PRN    enoxaparin (LOVENOX) injection 40 mg  40 mg SubCUTAneous Q24H    insulin lispro (HUMALOG) injection   SubCUTAneous AC&HS    dextrose 40% (GLUTOSE) oral gel 1 Tube  15 g Oral PRN    glucagon (GLUCAGEN) injection 1 mg  1 mg IntraMUSCular PRN    dextrose (D50W) injection syrg 12.5-25 g  25-50 mL IntraVENous PRN       Signed:  Abdullahi Murillo MD    Part of this note may have been written by using a voice dictation software. The note has been proof read but may still contain some grammatical/other typographical errors.

## 2021-10-29 NOTE — DISCHARGE SUMMARY
[facility] Discharge Summary      Patient ID:  Riri Peña  [id number]  35 y.o.  1940    Allergies: Patient has no known allergies. Admit date: 10/26/2021    Discharge date and time: No discharge date for patient encounter. Admitting Physician: Arsenio Paget, MD     Discharge Physician: Leonor Stewart MD     * Admission Diagnoses: Closed trimalleolar fracture of right ankle, initial encounter [S82.851A]  Ankle fracture, right [S82.891A]    * Discharge Diagnoses:   Hospital Problems as of 10/29/2021 Date Reviewed: 3/2/2021        Codes Class Noted - Resolved POA    Type 2 diabetes mellitus with hyperglycemia, without long-term current use of insulin ([facility] ICD-10-CM: E11.65  ICD-9-CM: 250.00, 790.29  10/26/2021 - Present Yes        * (Principal) Ankle fracture, right ICD-10-CM: V90.050N  ICD-9-CM: 824.8  10/26/2021 - Present Yes        Acquired hypothyroidism (Chronic) ICD-10-CM: E03.9  ICD-9-CM: 244.9  10/17/2016 - Present Yes        Hypertension, essential, benign (Chronic) ICD-10-CM: I10  ICD-9-CM: 401.1  10/17/2016 - Present Yes        Diabetic neuropathy, painful (HCC) (Chronic) ICD-10-CM: E11.40  ICD-9-CM: 250.60, 357.2  10/17/2016 - Present Yes              Surgeon: Leonor Stewart MD      * Procedure: Procedure(s):  RIGHT ANKLE OPEN REDUCTION INTERNAL FIXATION WITH POSS SYNDESMOTIC SCREW CHOICE ANES           Perioperative Antibiotics: Ancef  2gm ___                                                Postoperative Pain Management:  Norco 5/325mg  prescribed on discharge    DVT Prophylaxis: Lovenox                                     Post Op complications: none    Hemoglobin at discharge: 12.7___    * Discharge Condition: good  Wound appears to be healing without any evidence of infection. Physical Therapy started on the day following surgery and progressed to independent ambulation with the aid of a walker.   At the time of discharge, able to go up and down stairs and had understanding of precautions needed following surgery.       * Discharged to: [facility] ([facility])    * Follow-up Care/Discharge instructions:  - Anticoagulate with: Lovenox   - Resume pre hospital diet            - Resume home medications per medical continuation form     - NWB Status  - Prescriptions written and placed in pt chart  - Follow up in office as scheduled       Signed:  Willem Liriano NP  10/29/2021  12:28 PM

## 2021-10-29 NOTE — PROGRESS NOTES
Hospitalist Progress Note   Admit Date:  10/26/2021  7:29 AM   Name:  Jorge Goddard   Age:  80 y.o. Sex:  male  :  1940   MRN:  [id number]   Room:  [facility]/    Presenting Complaint: No chief complaint on file. Reason(s) for Admission: Closed trimalleolar fracture of right ankle, initial encounter [S82.467Y]  Ankle fracture, right TWELVE-STEP LIVING [facility] Course & Interval History:       Mr. Salvador Ibrahim is a 81 yo male with PMH of DM2, HTN admitted to ortho s/p closed trimalleolar fracture of right ankle. He is s/p repair. Discharge plans pending. Subjective (10/29/21):     Some mild pains, ate ok, had BM,  Poor sleep, no dyspnea or cough    Assessment & Plan:     Principal Problem:    Ankle fracture, right (10/26/2021)  ·   Postop per orthopedics   · PT,OT  · Pending SNF      Active Problems:    Acquired hypothyroidism (10/17/2016)  ·   Continued synthroid        Hypertension, essential, benign (10/17/2016)  · Continued lisinopril- HCTZ  As tolerant, held 10-28 as lower BP ranges        Type 2 diabetes mellitus with hyperglycemia, without long-term current use of insulin ([facility] (10/26/2021)  · Continued sliding scale coverage   · Holding actos, metformin   · continued jardiance   · A1C 7.3        Thrombocytopenia:  · Trend CBC- has chronic trend  · Platelets 895G on 76-31        Constipation:  · Colace and miralax       Dispo/Discharge Planning:    Pending     Diet:  ADULT DIET Regular; 4 carb choices (60 gm/meal)  DVT PPx: lovenox  Code status: Full Code    Hospital Problems as of 10/29/2021 Date Reviewed: 3/2/2021        Codes Class Noted - Resolved POA    Type 2 diabetes mellitus with hyperglycemia, without long-term current use of insulin (HCC) ICD-10-CM: E11.65  ICD-9-CM: 250.00, 790.29  10/26/2021 - Present Yes        * (Principal) Ankle fracture, right ICD-10-CM: U80.020L  ICD-9-CM: 824.8  10/26/2021 - Present Yes        Acquired hypothyroidism (Chronic) ICD-10-CM: E03.9  ICD-9-CM: 244.9 10/17/2016 - Present Yes        Hypertension, essential, benign (Chronic) ICD-10-CM: I10  ICD-9-CM: 401.1  10/17/2016 - Present Yes        Diabetic neuropathy, painful (HCC) (Chronic) ICD-10-CM: E11.40  ICD-9-CM: 250.60, 357.2  10/17/2016 - Present Yes              Objective:     Patient Vitals for the past 24 hrs:   Temp Pulse Resp BP SpO2   10/29/21 1122 97.9 °F (36.6 °C) 70 18 116/67 97 %   10/29/21 0747 97.9 °F (36.6 °C) 77 19 107/65 94 %   10/29/21 0405 98.3 °F (36.8 °C) 68 20 124/66 93 %   10/29/21 0006 98.3 °F (36.8 °C) 87 20 (!) 102/59 94 %   10/28/21 2004 97.9 °F (36.6 °C) 82 18 123/66 93 %   10/28/21 1545 98 °F (36.7 °C) 84 18 133/80 95 %     Oxygen Therapy  O2 Sat (%): 97 % (10/29/21 1122)  Pulse via Oximetry: 67 beats per minute (10/26/21 1231)  O2 Device: None (Room air) (10/29/21 0405)  O2 Flow Rate (L/min): 3 l/min (10/26/21 1158)    Estimated body mass index is 30.68 kg/m² as calculated from the following:    Height as of this encounter: 5' 11\" (1.803 m). Weight as of this encounter: 99.8 kg (220 lb). Intake/Output Summary (Last 24 hours) at 10/29/2021 1400  Last data filed at 10/29/2021 1223  Gross per 24 hour   Intake    Output 700 ml   Net -700 ml         Physical Exam:     Blood pressure 116/67, pulse 70, temperature 97.9 °F (36.6 °C), resp. rate 18, height 5' 11\" (1.803 m), weight 99.8 kg (220 lb), SpO2 97 %. General:    Well nourished. No overt distress, pleasant , conversive  CV:   RRR. No m/r/g. No jugular venous distension. Lungs:   CTAB. No wheezing, rhonchi, or rales. Respirations even, unlabored- anterior   Abdomen: Bowel sounds present. Soft, nontender, nondistended. Extremities: No cyanosis or clubbing. No edema, RLE splinted  Skin:     No rashes and normal coloration. Warm and dry. Neuro:   grossly intact. Psych:  Normal mood and affect.       I have reviewed ordered lab tests and independently visualized imaging below:    Recent Labs:  Recent Results (from the past 48 hour(s))   PLEASE READ & DOCUMENT PPD TEST IN 24 HRS    Collection Time: 10/27/21  3:52 PM   Result Value Ref Range    PPD Negative Negative    mm Induration 0 0 - 5 mm   GLUCOSE, POC    Collection Time: 10/27/21  4:16 PM   Result Value Ref Range    Glucose (POC) 169 (H) 65 - 100 mg/dL    Performed by Humza    GLUCOSE, POC    Collection Time: 10/27/21  9:15 PM   Result Value Ref Range    Glucose (POC) 250 (H) 65 - 100 mg/dL    Performed by Lianna Varela    GLUCOSE, POC    Collection Time: 10/28/21  6:54 AM   Result Value Ref Range    Glucose (POC) 188 (H) 65 - 100 mg/dL    Performed by Kristi Gordillo 1348, POC    Collection Time: 10/28/21 10:50 AM   Result Value Ref Range    Glucose (POC) 181 (H) 65 - 100 mg/dL    Performed by Alex    PLEASE READ & DOCUMENT PPD TEST IN 48 HRS    Collection Time: 10/28/21  3:12 PM   Result Value Ref Range    PPD Negative Negative    mm Induration 0 0 - 5 mm   GLUCOSE, POC    Collection Time: 10/28/21  3:53 PM   Result Value Ref Range    Glucose (POC) 204 (H) 65 - 100 mg/dL    Performed by Pily Langley    GLUCOSE, POC    Collection Time: 10/28/21  9:48 PM   Result Value Ref Range    Glucose (POC) 258 (H) 65 - 100 mg/dL    Performed by [employer]    GLUCOSE, POC    Collection Time: 10/29/21  6:14 AM   Result Value Ref Range    Glucose (POC) 202 (H) 65 - 100 mg/dL    Performed by [employer]    CBC WITH AUTOMATED DIFF    Collection Time: 10/29/21  6:53 AM   Result Value Ref Range    WBC 5.2 4.3 - 11.1 K/uL    RBC 3.87 (L) 4.23 - 5.6 M/uL    HGB 12.7 (L) 13.6 - 17.2 g/dL    HCT 37.9 (L) 41.1 - 50.3 %    MCV 97.9 (H) 79.6 - 97.8 FL    MCH 32.8 26.1 - 32.9 PG    MCHC 33.5 31.4 - 35.0 g/dL    RDW 13.2 11.9 - 14.6 %    PLATELET 046 (L) 153 - 450 K/uL    MPV 9.9 9.4 - 12.3 FL    ABSOLUTE NRBC 0.00 0.0 - 0.2 K/uL    DF AUTOMATED      NEUTROPHILS 62 43 - 78 %    LYMPHOCYTES 23 13 - 44 %    MONOCYTES 10 4.0 - 12.0 %    EOSINOPHILS 3 0.5 - 7.8 % BASOPHILS 1 0.0 - 2.0 %    IMMATURE GRANULOCYTES 1 0.0 - 5.0 %    ABS. NEUTROPHILS 3.2 1.7 - 8.2 K/UL    ABS. LYMPHOCYTES 1.2 0.5 - 4.6 K/UL    ABS. MONOCYTES 0.5 0.1 - 1.3 K/UL    ABS. EOSINOPHILS 0.2 0.0 - 0.8 K/UL    ABS. BASOPHILS 0.0 0.0 - 0.2 K/UL    ABS. IMM. GRANS. 0.0 0.0 - 0.5 K/UL   METABOLIC PANEL, BASIC    Collection Time: 10/29/21  6:53 AM   Result Value Ref Range    Sodium 133 (L) 138 - 145 mmol/L    Potassium 4.4 3.5 - 5.1 mmol/L    Chloride 102 98 - 107 mmol/L    CO2 25 21 - 32 mmol/L    Anion gap 6 (L) 7 - 16 mmol/L    Glucose 224 (H) 65 - 100 mg/dL    BUN 34 (H) 8 - 23 MG/DL    Creatinine 1.10 0.8 - 1.5 MG/DL    GFR est AA >60 >60 ml/min/1.73m2    GFR est non-AA >60 >60 ml/min/1.73m2    Calcium 8.9 8.3 - 10.4 MG/DL   GLUCOSE, POC    Collection Time: 10/29/21 11:14 AM   Result Value Ref Range    Glucose (POC) 279 (H) 65 - 100 mg/dL    Performed by Robel Munoz        All Micro Results     Procedure Component Value Units Date/Time    COVID-19 RAPID TEST [[id number]] Collected: 10/26/21 0944    Order Status: Completed Specimen: Nasopharyngeal Updated: 10/26/21 1048     Specimen source NASAL        Comment: RAPID ONLY        COVID-19 rapid test Not detected        Comment:      The specimen is NEGATIVE for SARS-CoV-2, the novel coronavirus associated with COVID-19. A negative result does not rule out COVID-19. This test has been authorized by the FDA under an Emergency Use Authorization (EUA) for use by authorized laboratories. Fact sheet for Healthcare Providers: ConventionUpdate.co.nz  Fact sheet for Patients: ConventionUpdate.co.nz       Methodology: Isothermal Nucleic Acid Amplification               Other Studies:  No results found.     Current Meds:  Current Facility-Administered Medications   Medication Dose Route Frequency    insulin glargine (LANTUS) injection 20 Units  20 Units SubCUTAneous QHS    bisacodyL (DULCOLAX) suppository 10 mg  10 mg Rectal DAILY PRN    bisacodyL (DULCOLAX) tablet 5 mg  5 mg Oral DAILY PRN    influenza vaccine 2021-22 (6 mos+)(PF) (FLUARIX/FLULAVAL/FLUZONE QUAD) injection 0.5 mL  1 Each IntraMUSCular PRIOR TO DISCHARGE    meclizine (ANTIVERT) tablet 25 mg  25 mg Oral Q6H PRN    docusate sodium (COLACE) capsule 100 mg  100 mg Oral BID    polyethylene glycol (MIRALAX) packet 17 g  17 g Oral DAILY PRN    sodium chloride (NS) flush 5-40 mL  5-40 mL IntraVENous PRN    empagliflozin (JARDIANCE) tablet 25 mg (Patient Supplied)  25 mg Oral DAILY    multivitamin, tx-iron-ca-min (THERA-M w/ IRON) tablet 1 Tablet  1 Tablet Oral DAILY    gabapentin (NEURONTIN) capsule 300 mg  300 mg Oral TID    levothyroxine (SYNTHROID) tablet 125 mcg  125 mcg Oral ACB    [Held by provider] lisinopril-hydroCHLOROthiazide (PRINZIDE, ZESTORETIC) 20-12.5 mg per tablet 1 Tablet  1 Tablet Oral DAILY    [Held by provider] metFORMIN ER (GLUCOPHAGE XR) tablet 1,000 mg  1,000 mg Oral ACB&D    [Held by provider] pioglitazone (ACTOS) tablet 30 mg  30 mg Oral 7am    pravastatin (PRAVACHOL) tablet 40 mg  40 mg Oral QHS    rOPINIRole (REQUIP) tablet 0.5 mg  0.5 mg Oral QHS    sodium chloride (NS) flush 5-40 mL  5-40 mL IntraVENous Q8H    sodium chloride (NS) flush 5-40 mL  5-40 mL IntraVENous PRN    acetaminophen (TYLENOL) tablet 650 mg  650 mg Oral Q4H PRN    oxyCODONE IR (ROXICODONE) tablet 5 mg  5 mg Oral Q4H PRN    HYDROmorphone (DILAUDID) injection 1 mg  1 mg IntraVENous Q4H PRN    enoxaparin (LOVENOX) injection 40 mg  40 mg SubCUTAneous Q24H    insulin lispro (HUMALOG) injection   SubCUTAneous AC&HS    dextrose 40% (GLUTOSE) oral gel 1 Tube  15 g Oral PRN    glucagon (GLUCAGEN) injection 1 mg  1 mg IntraMUSCular PRN    dextrose (D50W) injection syrg 12.5-25 g  25-50 mL IntraVENous PRN       Signed:  Alejandra Wisdom MD    Part of this note may have been written by using a voice dictation software.   The note has been proof read but may still contain some grammatical/other typographical errors.

## 2021-10-29 NOTE — PROGRESS NOTES
ACUTE PHYSICAL THERAPY GOALS:  (Developed with and agreed upon by patient and/or caregiver.)  (Developed with and agreed upon by patient and/or caregiver.)  (1.) Thao Banda will move from supine to sit and sit to supine , scoot up and down and roll side to side with MODIFIED INDEPENDENCE within 7 treatment day(s). (2.) Thao Banda will transfer from bed to chair and chair to bed with MINIMAL ASSIST while maintaining NWB RLE using the least restrictive device within 7 treatment day(s). (3.) Thao Banda will ambulate with MINIMAL ASSIST for 15 feet  while maintaining NWB RLE  with the least restrictive device within 7 treatment day(s). (4.) Thao Banda will perform standing static and dynamic balance activities x 10 minutes with MINIMAL ASSIST  while maintaining NWB RLE  to improve safety within 7 treatment day(s). (5.) Thao Banda will perform therapeutic exercises x 15 min for HEP with INDEPENDENCE to improve strength, endurance, and functional mobility within 7 treatment day(s).      PHYSICAL THERAPY: Daily Note and AM Treatment Day # 3    Thao Banda is a 80 y.o. male   PRIMARY DIAGNOSIS: Ankle fracture, right  Closed trimalleolar fracture of right ankle, initial encounter [S82.851A]  Ankle fracture, right [S82.891A]  Procedure(s) (LRB):  RIGHT ANKLE OPEN REDUCTION INTERNAL FIXATION WITH POSS SYNDESMOTIC SCREW CHOICE ANES (Right)  3 Days Post-Op    ASSESSMENT:     REHAB RECOMMENDATIONS: CURRENT LEVEL OF FUNCTION:  (Most Recently Demonstrated)   Recommendation to date pending progress:  Setting:   Short-term Rehab  Equipment:    To Be Determined Bed Mobility:   Minimal Assistance  Sit to Stand:   Moderate Assistance x 2  Transfers:   Moderate Assistance x 2  Gait/Mobility:   Unable to perform     ASSESSMENT:  Mr. Malgorzata Cassidy is a 80year old M who presents s/p RLE ankle ORIF after he sustained a R ankle fx when he slipped on some acorns. NWB RLE.      Pt presents supine in bed and is saturated with urine. PCT came to room and assisted with getting pt cleaned up. He rolled L with mod assist and then performed supine to sit with min assist.  He stood and transferred to [facility] and then to chair with rolling walker and mod assist x 2. He does fairly well with NWB however continues to require cues because he seems to put it down slightly when trying to transfer. He also requires cues for hand placement, posture, keeping rolling walker close to him, and pushing thru UEs. He was left up in chair with needs in reach. He is making progress towards goals, slowly using his UEs better on the rolling walker. Will continue with POC. SUBJECTIVE:   Mr. Emmanuel Parrish states, \"I finally had a BM last night. \"    SOCIAL HISTORY/ LIVING ENVIRONMENT:  Lives with his spouse (who is in poor health), typically active and independent at baseline. 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 Lives in a one story home, states there are stairs but also a ramp he can use to enter.    Home Environment: Private residence  One/Two Story Residence: One story  Living Alone: No  Support Systems: Other Family Member(s), Child(nathaly), Spouse/Significant Other  OBJECTIVE:     PAIN: VITAL SIGNS: LINES/DRAINS:   Pre Treatment:    Post Treatment: 4/10   None  O2 Device: None (Room air)     MOBILITY: I Mod I S SBA CGA Min Mod Max Total  NT x2 Comments:   Bed Mobility    Rolling [] [] [] [] [] [x] [] [] [] [] []    Supine to Sit [] [] [] [] [] [x] [] [] [] [] []    Scooting [] [] [] [] [] [x] [] [] [] [] []    Sit to Supine [] [] [] [] [] [x] [] [] [] [] []    Transfers    Sit to Stand [] [] [] [] [] [] [x] [] [] [] [x]    Bed to Chair [] [] [] [] [] [] [x] [] [] [] [x]    Stand to Sit [] [] [] [] [] [] [x] [] [] [] [x]    I=Independent, Mod I=Modified Independent, S=Supervision, SBA=Standby Assistance, CGA=Contact Guard Assistance,   Min=Minimal Assistance, Mod=Moderate Assistance, Max=Maximal Assistance, Total=Total Assistance, NT=Not Tested    BALANCE: Good Fair+ Fair Fair- Poor NT Comments   Sitting Static [x] [] [] [] [] []    Sitting Dynamic [x] [] [] [] [] []              Standing Static [] [] [] [x] [x] []    Standing Dynamic [] [] [] [] [x] []      GAIT: I Mod I S SBA CGA Min Mod Max Total  NT x2 Comments:   Level of Assistance [] [] [] [] [] [] [] [] [] [x] []    Distance N/A    DME Rolling Walker and Gait Belt    Gait Quality N/A    Weightbearing  Status NWB RLE    I=Independent, Mod I=Modified Independent, S=Supervision, SBA=Standby Assistance, CGA=Contact Guard Assistance,   Min=Minimal Assistance, Mod=Moderate Assistance, Max=Maximal Assistance, Total=Total Assistance, NT=Not Tested    PLAN:   FREQUENCY/DURATION: PT Plan of Care: BID for duration of hospital stay or until stated goals are met, whichever comes first.  TREATMENT:     TREATMENT:   ($$ Therapeutic Activity: 23-37 mins    )  Therapeutic Activity (34 Minutes): Therapeutic activity included Rolling, Supine to Sit, Scooting, Transfer Training, Sitting balance  and Standing balance to improve functional Mobility, Strength, ROM and Activity tolerance.     TREATMENT GRID:  N/A    AFTER TREATMENT POSITION/PRECAUTIONS:  Chair, Needs within reach and RN notified    INTERDISCIPLINARY COLLABORATION:  RN/PCT, PT/PTA and Rehab Attendant     TOTAL TREATMENT DURATION:  PT Patient Time In/Time Out  Time In: 0937  Time Out: LEONARDO Ibarra

## 2021-10-29 NOTE — PROGRESS NOTES
ACUTE PHYSICAL THERAPY GOALS:  (Developed with and agreed upon by patient and/or caregiver.)  (Developed with and agreed upon by patient and/or caregiver.)  (1.) Yuli Morgan will move from supine to sit and sit to supine , scoot up and down and roll side to side with MODIFIED INDEPENDENCE within 7 treatment day(s). (2.) Yuli Morgan will transfer from bed to chair and chair to bed with MINIMAL ASSIST while maintaining NWB RLE using the least restrictive device within 7 treatment day(s). (3.) Yuli Morgan will ambulate with MINIMAL ASSIST for 15 feet  while maintaining NWB RLE  with the least restrictive device within 7 treatment day(s). (4.) Yuli Morgan will perform standing static and dynamic balance activities x 10 minutes with MINIMAL ASSIST  while maintaining NWB RLE  to improve safety within 7 treatment day(s). (5.) Yuli Morgan will perform therapeutic exercises x 15 min for HEP with INDEPENDENCE to improve strength, endurance, and functional mobility within 7 treatment day(s).      PHYSICAL THERAPY: Daily Note and PM Treatment Day # 3    Yuli Morgan is a 80 y.o. male   PRIMARY DIAGNOSIS: Ankle fracture, right  Closed trimalleolar fracture of right ankle, initial encounter [S82.851A]  Ankle fracture, right [S82.891A]  Procedure(s) (LRB):  RIGHT ANKLE OPEN REDUCTION INTERNAL FIXATION WITH POSS SYNDESMOTIC SCREW CHOICE ANES (Right)  3 Days Post-Op    ASSESSMENT:     REHAB RECOMMENDATIONS: CURRENT LEVEL OF FUNCTION:  (Most Recently Demonstrated)   Recommendation to date pending progress:  Setting:   Short-term Rehab  Equipment:    To Be Determined Bed Mobility:   Minimal Assistance  Sit to Stand:   Moderate Assistance x 2  Transfers:   Moderate Assistance x 2  Gait/Mobility:   Unable to perform     ASSESSMENT:   S [facility] is a 80year old M who presents s/p RLE ankle ORIF after he sustained a R ankle fx when he slipped on some acorns. NWB RLE.    This PM, pt sitting up in chair and requesting to use BSC. He states he has vertigo from time to time and is a little dizzy this afternoon. He continues to require mod assist x 2 for transfers using rolling walker to [facility] and then to bed. He is pushing more thru his UEs and doing a little better with NWB this afternoon. He does get a little anxious about getting to the bed and asks a few times, \"Am I there yet? \". He returned supine and left with needs in reach. Will continue with POC. SUBJECTIVE:   Mr. Melva Gross states, \"I have vertigo and I'm just a little dizzy this afternoon. \"    SOCIAL HISTORY/ LIVING ENVIRONMENT:  Lives with his spouse (who is in poor health), typically active and independent at baseline. 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 Lives in a one story home, states there are stairs but also a ramp he can use to enter.    Home Environment: Private residence  One/Two Story Residence: One story  Living Alone: No  Support Systems: Other Family Member(s), Child(nathaly), Spouse/Significant Other  OBJECTIVE:     PAIN: VITAL SIGNS: LINES/DRAINS:   Pre Treatment:    Post Treatment: 4/10   None  O2 Device: None (Room air)     MOBILITY: I Mod I S SBA CGA Min Mod Max Total  NT x2 Comments:   Bed Mobility    Rolling [] [] [] [] [] [x] [] [] [] [] []    Supine to Sit [] [] [] [] [] [x] [] [] [] [] []    Scooting [] [] [] [] [] [x] [] [] [] [] []    Sit to Supine [] [] [] [] [] [x] [] [] [] [] []    Transfers    Sit to Stand [] [] [] [] [] [] [x] [] [] [] [x]    Bed to Chair [] [] [] [] [] [] [x] [] [] [] [x]    Stand to Sit [] [] [] [] [] [] [x] [] [] [] [x]    I=Independent, Mod I=Modified Independent, S=Supervision, SBA=Standby Assistance, CGA=Contact Guard Assistance,   Min=Minimal Assistance, Mod=Moderate Assistance, Max=Maximal Assistance, Total=Total Assistance, NT=Not Tested    BALANCE: Good Fair+ Fair Fair- Poor NT Comments   Sitting Static [x] [] [] [] [] []    Sitting Dynamic [x] [] [] [] [] [] Standing Static [] [] [] [x] [x] []    Standing Dynamic [] [] [] [] [x] []      GAIT: I Mod I S SBA CGA Min Mod Max Total  NT x2 Comments:   Level of Assistance [] [] [] [] [] [] [] [] [] [x] []    Distance N/A    DME Rolling Walker and Gait Belt    Gait Quality N/A    Weightbearing  Status NWB RLE    I=Independent, Mod I=Modified Independent, S=Supervision, SBA=Standby Assistance, CGA=Contact Guard Assistance,   Min=Minimal Assistance, Mod=Moderate Assistance, Max=Maximal Assistance, Total=Total Assistance, NT=Not Tested    PLAN:   FREQUENCY/DURATION: PT Plan of Care: BID for duration of hospital stay or until stated goals are met, whichever comes first.  TREATMENT:     TREATMENT:   ($$ Therapeutic Activity: 23-37 mins    )  Therapeutic Activity (25 Minutes): Therapeutic activity included Rolling, Sit to Supine, Scooting, Transfer Training, Sitting balance  and Standing balance to improve functional Mobility, Strength, ROM and Activity tolerance.     TREATMENT GRID:  N/A    AFTER TREATMENT POSITION/PRECAUTIONS:  Bed, Needs within reach and RN notified    INTERDISCIPLINARY COLLABORATION:  RN/PCT, PT/PTA and Rehab Attendant     TOTAL TREATMENT DURATION:  PT Patient Time In/Time Out  Time In: 1330  Time Out: [facility]

## 2021-10-29 NOTE — PROGRESS NOTES
No acute events overnight. Patient resting quietly in bed. Respirations present, even and unlabored on room air. Bed low and locked, safety measures in place. No signs of distress, no needs expressed by the patient. Cast to LLE CDI. Call light within reach, encouraged patient to call with any needs. Preparing to give report to oncoming RN.

## 2021-10-29 NOTE — PROGRESS NOTES
[facility] FRACTURE PROGRESS NOTE    2021  Admit Date:   10/26/2021    Post Op day: 2 Days Post-Op    Subjective:    Mikal Garcia Is alert eating lunch in bedside chair. He reports pain being under control and has not had any N/V, SOB or CP. Reports occasional pain as feeling like zingers shooting proximally towards his knee. He is ready to go to rehab.     PT/OT:   Gait:   NWB                  Vital Signs:    Patient Vitals for the past 8 hrs:   BP Temp Pulse Resp SpO2   10/29/21 1122 116/67 97.9 °F (36.6 °C) 70 18 97 %   10/29/21 0747 107/65 97.9 °F (36.6 °C) 77 19 94 %     Temp (24hrs), Av.1 °F (36.7 °C), Min:97.9 °F (36.6 °C), Max:98.3 °F (36.8 °C)      Pain Control:   Pain Assessment  Pain Scale 1: Numeric (0 - 10)  Pain Intensity 1: 0  Pain Onset 1: postop  Pain Location 1: Ankle  Pain Orientation 1: Right  Pain Description 1: Aching  Pain Intervention(s) 1: Declines    Meds:    Current Facility-Administered Medications   Medication Dose Route Frequency    bisacodyL (DULCOLAX) suppository 10 mg  10 mg Rectal DAILY PRN    bisacodyL (DULCOLAX) tablet 5 mg  5 mg Oral DAILY PRN    influenza vaccine  (6 mos+)(PF) (FLUARIX/FLULAVAL/FLUZONE QUAD) injection 0.5 mL  1 Each IntraMUSCular PRIOR TO DISCHARGE    meclizine (ANTIVERT) tablet 25 mg  25 mg Oral Q6H PRN    docusate sodium (COLACE) capsule 100 mg  100 mg Oral BID    polyethylene glycol (MIRALAX) packet 17 g  17 g Oral DAILY PRN    sodium chloride (NS) flush 5-40 mL  5-40 mL IntraVENous PRN    empagliflozin (JARDIANCE) tablet 25 mg (Patient Supplied)  25 mg Oral DAILY    multivitamin, tx-iron-ca-min (THERA-M w/ IRON) tablet 1 Tablet  1 Tablet Oral DAILY    gabapentin (NEURONTIN) capsule 300 mg  300 mg Oral TID    levothyroxine (SYNTHROID) tablet 125 mcg  125 mcg Oral ACB    [Held by provider] lisinopril-hydroCHLOROthiazide (PRINZIDE, ZESTORETIC) 20-12.5 mg per tablet 1 Tablet  1 Tablet Oral DAILY    [Held by provider] metFORMIN ER (GLUCOPHAGE XR) tablet 1,000 mg  1,000 mg Oral ACB&D    [Held by provider] pioglitazone (ACTOS) tablet 30 mg  30 mg Oral 7am    pravastatin (PRAVACHOL) tablet 40 mg  40 mg Oral QHS    rOPINIRole (REQUIP) tablet 0.5 mg  0.5 mg Oral QHS    sodium chloride (NS) flush 5-40 mL  5-40 mL IntraVENous Q8H    sodium chloride (NS) flush 5-40 mL  5-40 mL IntraVENous PRN    acetaminophen (TYLENOL) tablet 650 mg  650 mg Oral Q4H PRN    oxyCODONE IR (ROXICODONE) tablet 5 mg  5 mg Oral Q4H PRN    HYDROmorphone (DILAUDID) injection 1 mg  1 mg IntraVENous Q4H PRN    enoxaparin (LOVENOX) injection 40 mg  40 mg SubCUTAneous Q24H    insulin lispro (HUMALOG) injection   SubCUTAneous AC&HS    dextrose 40% (GLUTOSE) oral gel 1 Tube  15 g Oral PRN    glucagon (GLUCAGEN) injection 1 mg  1 mg IntraMUSCular PRN    dextrose (D50W) injection syrg 12.5-25 g  25-50 mL IntraVENous PRN       LAB:    Recent Labs     10/29/21  0653   HCT 37.9*   HGB 12.7*       24 Hour Assessment Issues:    Oriented    Discharge Planning: SNF    Assessment & Physician's Comment:  Neurovascular checks within normal limits, dressing with splint is clean, dry and intact. He denies SOB, CP, or N/V. Pain is well controlled at this time. Again we discussed SNF placement and expectations for recovery. Principal Problem:    Ankle fracture, right (10/26/2021)    Active Problems:    Acquired hypothyroidism (10/17/2016)      Hypertension, essential, benign (10/17/2016)      Diabetic neuropathy, painful ([facility] [address] (10/17/2016)      Type 2 diabetes mellitus with hyperglycemia, without long-term current use of insulin ([facility] [address] (10/26/2021)        Plan:  Pending SNF- [facility]  Hospitalist managing medically. Continue PT BID- NWB status.    Pain control    Francoise Fitzgerald NP

## 2021-10-29 NOTE — PROGRESS NOTES
Problem: Falls - Risk of  Goal: *Absence of Falls  Description: Document Clydene Bone Fall Risk and appropriate interventions in the flowsheet.   Outcome: Progressing Towards Goal  Note: Fall Risk Interventions:  Mobility Interventions: Patient to call before getting OOB         Medication Interventions: Evaluate medications/consider consulting pharmacy    Elimination Interventions: Bed/chair exit alarm    History of Falls Interventions: Bed/chair exit alarm

## 2021-10-29 NOTE — PROGRESS NOTES
Report received from rusty RN. Patient resting quietly in bed, son present at bedside. Patient AOx4. Respirations present, even and unlabored on RA. Male external catheter patent and draining clear yellow urine. Cast to LLE CDI. Bed low and locked, safety measures in place. No signs of distress, needs addressed. Call light within reach, encouraged patient to call with any needs. Will continue to monitor.

## 2021-10-30 VITALS
TEMPERATURE: 99.5 F | SYSTOLIC BLOOD PRESSURE: 110 MMHG | OXYGEN SATURATION: 97 % | HEART RATE: 69 BPM | HEIGHT: 71 IN | RESPIRATION RATE: 17 BRPM | DIASTOLIC BLOOD PRESSURE: 66 MMHG | BODY MASS INDEX: 30.8 KG/M2 | WEIGHT: 220 LBS

## 2021-10-30 LAB
ALBUMIN SERPL-MCNC: 2.7 G/DL (ref 3.2–4.6)
ALBUMIN/GLOB SERPL: 0.8 {RATIO} (ref 1.2–3.5)
ALP SERPL-CCNC: 129 U/L (ref 50–136)
ALT SERPL-CCNC: 62 U/L (ref 12–65)
ANION GAP SERPL CALC-SCNC: 6 MMOL/L (ref 7–16)
AST SERPL-CCNC: 56 U/L (ref 15–37)
BILIRUB SERPL-MCNC: 1.6 MG/DL (ref 0.2–1.1)
BUN SERPL-MCNC: 28 MG/DL (ref 8–23)
CALCIUM SERPL-MCNC: 8.8 MG/DL (ref 8.3–10.4)
CHLORIDE SERPL-SCNC: 101 MMOL/L (ref 98–107)
CO2 SERPL-SCNC: 26 MMOL/L (ref 21–32)
CREAT SERPL-MCNC: 1.2 MG/DL (ref 0.8–1.5)
ERYTHROCYTE [DISTWIDTH] IN BLOOD BY AUTOMATED COUNT: 13 % (ref 11.9–14.6)
GLOBULIN SER CALC-MCNC: 3.6 G/DL (ref 2.3–3.5)
GLUCOSE BLD STRIP.AUTO-MCNC: 229 MG/DL (ref 65–100)
GLUCOSE BLD STRIP.AUTO-MCNC: 313 MG/DL (ref 65–100)
GLUCOSE SERPL-MCNC: 306 MG/DL (ref 65–100)
HCT VFR BLD AUTO: 37.7 % (ref 41.1–50.3)
HGB BLD-MCNC: 12.8 G/DL (ref 13.6–17.2)
MAGNESIUM SERPL-MCNC: 2 MG/DL (ref 1.8–2.4)
MCH RBC QN AUTO: 33.9 PG (ref 26.1–32.9)
MCHC RBC AUTO-ENTMCNC: 34 G/DL (ref 31.4–35)
MCV RBC AUTO: 99.7 FL (ref 79.6–97.8)
NRBC # BLD: 0 K/UL (ref 0–0.2)
PLATELET # BLD AUTO: 113 K/UL (ref 150–450)
PMV BLD AUTO: 9.9 FL (ref 9.4–12.3)
POTASSIUM SERPL-SCNC: 4.2 MMOL/L (ref 3.5–5.1)
PROT SERPL-MCNC: 6.3 G/DL (ref 6.3–8.2)
RBC # BLD AUTO: 3.78 M/UL (ref 4.23–5.6)
SERVICE CMNT-IMP: ABNORMAL
SERVICE CMNT-IMP: ABNORMAL
SODIUM SERPL-SCNC: 133 MMOL/L (ref 136–145)
WBC # BLD AUTO: 5.4 K/UL (ref 4.3–11.1)

## 2021-10-30 PROCEDURE — 74011250637 HC RX REV CODE- 250/637: Performed by: NURSE PRACTITIONER

## 2021-10-30 PROCEDURE — 36415 COLL VENOUS BLD VENIPUNCTURE: CPT

## 2021-10-30 PROCEDURE — 80053 COMPREHEN METABOLIC PANEL: CPT

## 2021-10-30 PROCEDURE — 82962 GLUCOSE BLOOD TEST: CPT

## 2021-10-30 PROCEDURE — 85027 COMPLETE CBC AUTOMATED: CPT

## 2021-10-30 PROCEDURE — 74011636637 HC RX REV CODE- 636/637: Performed by: INTERNAL MEDICINE

## 2021-10-30 PROCEDURE — 97530 THERAPEUTIC ACTIVITIES: CPT

## 2021-10-30 PROCEDURE — 74011250637 HC RX REV CODE- 250/637: Performed by: INTERNAL MEDICINE

## 2021-10-30 PROCEDURE — 83735 ASSAY OF MAGNESIUM: CPT

## 2021-10-30 RX ADMIN — Medication 10 ML: at 06:36

## 2021-10-30 RX ADMIN — LEVOTHYROXINE SODIUM 125 MCG: 75 TABLET ORAL at 06:35

## 2021-10-30 RX ADMIN — INSULIN LISPRO 4 UNITS: 100 INJECTION, SOLUTION INTRAVENOUS; SUBCUTANEOUS at 08:09

## 2021-10-30 RX ADMIN — GABAPENTIN 300 MG: 300 CAPSULE ORAL at 09:09

## 2021-10-30 RX ADMIN — MULTIPLE VITAMINS W/ MINERALS TAB 1 TABLET: TAB at 09:10

## 2021-10-30 RX ADMIN — DOCUSATE SODIUM 100 MG: 100 CAPSULE, LIQUID FILLED ORAL at 09:09

## 2021-10-30 RX ADMIN — ACETAMINOPHEN 650 MG: 325 TABLET ORAL at 09:09

## 2021-10-30 NOTE — PROGRESS NOTES
Hospitalist Progress Note   Admit Date:  10/26/2021  7:29 AM   Name:  Kb Boland   Age:  80 y.o. Sex:  male  :  1940   MRN:  [id number]   Room:  [facility]/    Presenting Complaint: No chief complaint on file. Reason(s) for Admission: Closed trimalleolar fracture of right ankle, initial encounter [S82.100T]  Ankle fracture, right [facility] Course & Interval History:       Mr. Maureen Bowen is a 81 yo male with PMH of DM2, HTN admitted to ortho s/p closed trimalleolar fracture of right ankle. He is s/p repair. A1C 7.3. prefers oral meds longterm. Discharge plans pending to rehab.        Subjective (10/30/21):    Up to chair, going to rehab today, eating ok,    Assessment & Plan:     Principal Problem:    Ankle fracture, right (10/26/2021)  ·   Postop per orthopedics   · PT,OT  · Pending SNF today      Active Problems:    Acquired hypothyroidism (10/17/2016)  ·   Continued synthroid        Hypertension, essential, benign (10/17/2016)  · Continued lisinopril- HCTZ  As tolerant, held 10-28 as lower BP ranges        Type 2 diabetes mellitus with hyperglycemia, without long-term current use of insulin ([address] (10/26/2021)  · Continued sliding scale coverage   · lantus started 10-29  · Holding actos, metformin -resume at discharge  · continued jardiance   · A1C 7.3        Thrombocytopenia:  · Trend CBC- has chronic trend  · Platelets 919Q on         Constipation:  · Colace and miralax       Elevated total bilirubin/LFTS:  · Will need followup lab- has chronic appearing trend       Dispo/Discharge Planning:    Pending     Diet:  ADULT DIET Regular; 4 carb choices (60 gm/meal)  DVT PPx: lovenox  Code status: Full Code    Hospital Problems as of 10/30/2021 Date Reviewed: 3/2/2021        Codes Class Noted - Resolved POA    Type 2 diabetes mellitus with hyperglycemia, without long-term current use of insulin ([address] ICD-10-CM: E11.65  ICD-9-CM: 250.00, 790.29  10/26/2021 - Present Yes        * (Principal) Ankle fracture, right ICD-10-CM: C56.450O  ICD-9-CM: 824.8  10/26/2021 - Present Yes        Acquired hypothyroidism (Chronic) ICD-10-CM: E03.9  ICD-9-CM: 244.9  10/17/2016 - Present Yes        Hypertension, essential, benign (Chronic) ICD-10-CM: I10  ICD-9-CM: 401.1  10/17/2016 - Present Yes        Diabetic neuropathy, painful (HCC) (Chronic) ICD-10-CM: E11.40  ICD-9-CM: 250.60, 357.2  10/17/2016 - Present Yes              Objective:     Patient Vitals for the past 24 hrs:   Temp Pulse Resp BP SpO2   10/30/21 0706 99.5 °F (37.5 °C) 69 17 110/66 97 %   10/30/21 0356 98.6 °F (37 °C) 69 18 128/74 97 %   10/29/21 2339 97.2 °F (36.2 °C) 86 20 93/76 91 %   10/29/21 2013 99.6 °F (37.6 °C) 80 20 134/72 95 %   10/29/21 1535 98.5 °F (36.9 °C) 72 18 119/71 94 %     Oxygen Therapy  O2 Sat (%): 97 % (10/30/21 0706)  Pulse via Oximetry: 67 beats per minute (10/26/21 1231)  O2 Device: None (Room air) (Pt removed CPAP) (10/30/21 0356)  O2 Flow Rate (L/min): 3 l/min (10/26/21 1158)    Estimated body mass index is 30.68 kg/m² as calculated from the following:    Height as of this encounter: 5' 11\" (1.803 m). Weight as of this encounter: 99.8 kg (220 lb). Intake/Output Summary (Last 24 hours) at 10/30/2021 1214  Last data filed at 10/30/2021 0643  Gross per 24 hour   Intake 480 ml   Output 2025 ml   Net -1545 ml         Physical Exam:     Blood pressure 110/66, pulse 69, temperature 99.5 °F (37.5 °C), resp. rate 17, height 5' 11\" (1.803 m), weight 99.8 kg (220 lb), SpO2 97 %. General:    Well nourished. No overt distress  CV:   RRR. No m/r/g. No jugular venous distension. Lungs:   CTAB. No wheezing, rhonchi, or rales. Respirations even, unlabored- anterior   Abdomen: Bowel sounds present. Soft, nontender, nondistended. Extremities: No cyanosis or clubbing. No edema, RLE splinted  Skin:     No rashes and normal coloration. Warm and dry. Neuro:   grossly intact. Psych:  Normal mood and affect. nita    I have reviewed ordered lab tests and independently visualized imaging below:    Recent Labs:  Recent Results (from the past 48 hour(s))   PLEASE READ & DOCUMENT PPD TEST IN 48 HRS    Collection Time: 10/28/21  3:12 PM   Result Value Ref Range    PPD Negative Negative    mm Induration 0 0 - 5 mm   GLUCOSE, POC    Collection Time: 10/28/21  3:53 PM   Result Value Ref Range    Glucose (POC) 204 (H) 65 - 100 mg/dL    Performed by [employer]    GLUCOSE, POC    Collection Time: 10/28/21  9:48 PM   Result Value Ref Range    Glucose (POC) 258 (H) 65 - 100 mg/dL    Performed by [employer]    GLUCOSE, POC    Collection Time: 10/29/21  6:14 AM   Result Value Ref Range    Glucose (POC) 202 (H) 65 - 100 mg/dL    Performed by [employer]    CBC WITH AUTOMATED DIFF    Collection Time: 10/29/21  6:53 AM   Result Value Ref Range    WBC 5.2 4.3 - 11.1 K/uL    RBC 3.87 (L) 4.23 - 5.6 M/uL    HGB 12.7 (L) 13.6 - 17.2 g/dL    HCT 37.9 (L) 41.1 - 50.3 %    MCV 97.9 (H) 79.6 - 97.8 FL    MCH 32.8 26.1 - 32.9 PG    MCHC 33.5 31.4 - 35.0 g/dL    RDW 13.2 11.9 - 14.6 %    PLATELET 113 (L) 120 - 450 K/uL    MPV 9.9 9.4 - 12.3 FL    ABSOLUTE NRBC 0.00 0.0 - 0.2 K/uL    DF AUTOMATED      NEUTROPHILS 62 43 - 78 %    LYMPHOCYTES 23 13 - 44 %    MONOCYTES 10 4.0 - 12.0 %    EOSINOPHILS 3 0.5 - 7.8 %    BASOPHILS 1 0.0 - 2.0 %    IMMATURE GRANULOCYTES 1 0.0 - 5.0 %    ABS. NEUTROPHILS 3.2 1.7 - 8.2 K/UL    ABS. LYMPHOCYTES 1.2 0.5 - 4.6 K/UL    ABS. MONOCYTES 0.5 0.1 - 1.3 K/UL    ABS. EOSINOPHILS 0.2 0.0 - 0.8 K/UL    ABS. BASOPHILS 0.0 0.0 - 0.2 K/UL    ABS. IMM.  GRANS. 0.0 0.0 - 0.5 K/UL   METABOLIC PANEL, BASIC    Collection Time: 10/29/21  6:53 AM   Result Value Ref Range    Sodium 133 (L) 138 - 145 mmol/L    Potassium 4.4 3.5 - 5.1 mmol/L    Chloride 102 98 - 107 mmol/L    CO2 25 21 - 32 mmol/L    Anion gap 6 (L) 7 - 16 mmol/L    Glucose 224 (H) 65 - 100 mg/dL    BUN 34 (H) 8 - 23 MG/DL    Creatinine 1.10 0.8 - 1.5 MG/DL GFR est AA >60 >60 ml/min/1.73m2    GFR est non-AA >60 >60 ml/min/1.73m2    Calcium 8.9 8.3 - 10.4 MG/DL   GLUCOSE, POC    Collection Time: 10/29/21 11:14 AM   Result Value Ref Range    Glucose (POC) 279 (H) 65 - 100 mg/dL    Performed by [id number], POC    Collection Time: 10/29/21  3:36 PM   Result Value Ref Range    Glucose (POC) 243 (H) 65 - 100 mg/dL    Performed by Altagracia    GLUCOSE, POC    Collection Time: 10/29/21  9:18 PM   Result Value Ref Range    Glucose (POC) 267 (H) 65 - 100 mg/dL    Performed by Alisha    GLUCOSE, POC    Collection Time: 10/30/21  6:26 AM   Result Value Ref Range    Glucose (POC) 229 (H) 65 - 100 mg/dL    Performed by Alisha    CBC W/O DIFF    Collection Time: 10/30/21  8:13 AM   Result Value Ref Range    WBC 5.4 4.3 - 11.1 K/uL    RBC 3.78 (L) 4.23 - 5.6 M/uL    HGB 12.8 (L) 13.6 - 17.2 g/dL    HCT 37.7 (L) 41.1 - 50.3 %    MCV 99.7 (H) 79.6 - 97.8 FL    MCH 33.9 (H) 26.1 - 32.9 PG    MCHC 34.0 31.4 - 35.0 g/dL    RDW 13.0 11.9 - 14.6 %    PLATELET 420 (L) 006 - 450 K/uL    MPV 9.9 9.4 - 12.3 FL    ABSOLUTE NRBC 0.00 0.0 - 0.2 K/uL   METABOLIC PANEL, COMPREHENSIVE    Collection Time: 10/30/21  8:13 AM   Result Value Ref Range    Sodium 133 (L) 136 - 145 mmol/L    Potassium 4.2 3.5 - 5.1 mmol/L    Chloride 101 98 - 107 mmol/L    CO2 26 21 - 32 mmol/L    Anion gap 6 (L) 7 - 16 mmol/L    Glucose 306 (H) 65 - 100 mg/dL    BUN 28 (H) 8 - 23 MG/DL    Creatinine 1.20 0.8 - 1.5 MG/DL    GFR est AA >60 >60 ml/min/1.73m2    GFR est non-AA >60 >60 ml/min/1.73m2    Calcium 8.8 8.3 - 10.4 MG/DL    Bilirubin, total 1.6 (H) 0.2 - 1.1 MG/DL    ALT (SGPT) 62 12 - 65 U/L    AST (SGOT) 56 (H) 15 - 37 U/L    Alk.  phosphatase 129 50 - 136 U/L    Protein, total 6.3 6.3 - 8.2 g/dL    Albumin 2.7 (L) 3.2 - 4.6 g/dL    Globulin 3.6 (H) 2.3 - 3.5 g/dL    A-G Ratio 0.8 (L) 1.2 - 3.5     MAGNESIUM    Collection Time: 10/30/21  8:13 AM   Result Value Ref Range    Magnesium 2.0 1.8 - 2.4 mg/dL   GLUCOSE, POC    Collection Time: 10/30/21 11:20 AM   Result Value Ref Range    Glucose (POC) 313 (H) 65 - 100 mg/dL    Performed by Altagracia        All Micro Results     Procedure Component Value Units Date/Time    COVID-19 RAPID TEST [[id number]] Collected: 10/26/21 0944    Order Status: Completed Specimen: Nasopharyngeal Updated: 10/26/21 1048     Specimen source NASAL        Comment: RAPID ONLY        COVID-19 rapid test Not detected        Comment:      The specimen is NEGATIVE for SARS-CoV-2, the novel coronavirus associated with COVID-19. A negative result does not rule out COVID-19. This test has been authorized by the FDA under an Emergency Use Authorization (EUA) for use by authorized laboratories. Fact sheet for Healthcare Providers: ConventionUpdate.co.nz  Fact sheet for Patients: HubPagesdate.co.nz       Methodology: Isothermal Nucleic Acid Amplification               Other Studies:  No results found.     Current Meds:  Current Facility-Administered Medications   Medication Dose Route Frequency    insulin glargine (LANTUS) injection 20 Units  20 Units SubCUTAneous QHS    bisacodyL (DULCOLAX) suppository 10 mg  10 mg Rectal DAILY PRN    bisacodyL (DULCOLAX) tablet 5 mg  5 mg Oral DAILY PRN    influenza vaccine 2021-22 (6 mos+)(PF) (FLUARIX/FLULAVAL/FLUZONE QUAD) injection 0.5 mL  1 Each IntraMUSCular PRIOR TO DISCHARGE    meclizine (ANTIVERT) tablet 25 mg  25 mg Oral Q6H PRN    docusate sodium (COLACE) capsule 100 mg  100 mg Oral BID    polyethylene glycol (MIRALAX) packet 17 g  17 g Oral DAILY PRN    sodium chloride (NS) flush 5-40 mL  5-40 mL IntraVENous PRN    empagliflozin (JARDIANCE) tablet 25 mg (Patient Supplied)  25 mg Oral DAILY    multivitamin, tx-iron-ca-min (THERA-M w/ IRON) tablet 1 Tablet  1 Tablet Oral DAILY    gabapentin (NEURONTIN) capsule 300 mg  300 mg Oral TID    levothyroxine (SYNTHROID) tablet 125 mcg  125 mcg Oral ACB    [Held by provider] lisinopril-hydroCHLOROthiazide (PRINZIDE, ZESTORETIC) 20-12.5 mg per tablet 1 Tablet  1 Tablet Oral DAILY    [Held by provider] metFORMIN ER (GLUCOPHAGE XR) tablet 1,000 mg  1,000 mg Oral ACB&D    [Held by provider] pioglitazone (ACTOS) tablet 30 mg  30 mg Oral 7am    pravastatin (PRAVACHOL) tablet 40 mg  40 mg Oral QHS    rOPINIRole (REQUIP) tablet 0.5 mg  0.5 mg Oral QHS    sodium chloride (NS) flush 5-40 mL  5-40 mL IntraVENous Q8H    sodium chloride (NS) flush 5-40 mL  5-40 mL IntraVENous PRN    acetaminophen (TYLENOL) tablet 650 mg  650 mg Oral Q4H PRN    oxyCODONE IR (ROXICODONE) tablet 5 mg  5 mg Oral Q4H PRN    HYDROmorphone (DILAUDID) injection 1 mg  1 mg IntraVENous Q4H PRN    enoxaparin (LOVENOX) injection 40 mg  40 mg SubCUTAneous Q24H    insulin lispro (HUMALOG) injection   SubCUTAneous AC&HS    dextrose 40% (GLUTOSE) oral gel 1 Tube  15 g Oral PRN    glucagon (GLUCAGEN) injection 1 mg  1 mg IntraMUSCular PRN    dextrose (D50W) injection syrg 12.5-25 g  25-50 mL IntraVENous PRN     Current Outpatient Medications   Medication Sig    enoxaparin (Lovenox) 40 mg/0.4 mL 0.4 mL by SubCUTAneous route daily. Indications: deep vein thrombosis prevention    oxyCODONE IR (Roxicodone) 5 mg immediate release tablet Take 1 Tablet by mouth every six (6) hours as needed for Pain for up to 5 days. Max Daily Amount: 20 mg.    acetaminophen (Tylenol Extra Strength) 500 mg tablet Take 1,000 mg by mouth two (2) times a day.  calcium carbonate/vitamin D3 (CALCIUM 600 + D,3, PO) Take  by mouth daily.  cholecalciferol (VITAMIN D3) (2,000 UNITS /50 MCG) cap capsule Take  by mouth every other day.  empagliflozin (Jardiance) 25 mg tablet Take 25 mg by mouth every other day. Last dose 12/19/47    folic acid/multivit-min/lutein (CENTRUM SILVER PO) Take  by mouth daily.     pravastatin (PRAVACHOL) 40 mg tablet TAKE 1 TABLET BY MOUTH  NIGHTLY FOR HYPERLIPIDEMIA  metFORMIN ER (GLUCOPHAGE XR) 500 mg tablet TAKE 4 TABLETS BY MOUTH  DAILY (Patient taking differently: 1,000 mg Before breakfast and dinner.)    gabapentin (NEURONTIN) 300 mg capsule TAKE 1 CAPSULE BY MOUTH  DAILY AT BEDTIME    rOPINIRole (REQUIP) 0.5 mg tablet TAKE 1 TABLET BY MOUTH  TWICE A DAY (Patient taking differently: 0.5 mg nightly. TAKE 1 TABLET BY MOUTH  TWICE A DAY)    lisinopril-hydroCHLOROthiazide (PRINZIDE, ZESTORETIC) 20-12.5 mg per tablet TAKE 1 TABLET BY MOUTH  DAILY    levothyroxine (SYNTHROID) 125 mcg tablet Take  by mouth Daily (before breakfast).  pioglitazone (ACTOS) 30 mg tablet TAKE 1 TABLET BY MOUTH  DAILY    ascorbic acid, vitamin C, (VITAMIN C) 500 mg tablet Take  by mouth daily.  omega-3 fatty acids-vitamin e (FISH OIL) 1,000 mg cap Take 1 Cap by mouth daily. Stopped 11/28/17       Signed:  Michelle Alexander MD    Part of this note may have been written by using a voice dictation software. The note has been proof read but may still contain some grammatical/other typographical errors.

## 2021-10-30 NOTE — PROGRESS NOTES
ACUTE PHYSICAL THERAPY GOALS:  (Developed with and agreed upon by patient and/or caregiver.)  (Developed with and agreed upon by patient and/or caregiver.)  (1.) Nadia Maki will move from supine to sit and sit to supine , scoot up and down and roll side to side with MODIFIED INDEPENDENCE within 7 treatment day(s). (2.) Nadia Maki will transfer from bed to chair and chair to bed with MINIMAL ASSIST while maintaining NWB RLE using the least restrictive device within 7 treatment day(s). (3.) Nadia Maki will ambulate with MINIMAL ASSIST for 15 feet  while maintaining NWB RLE  with the least restrictive device within 7 treatment day(s). (4.) Nadia Maki will perform standing static and dynamic balance activities x 10 minutes with MINIMAL ASSIST  while maintaining NWB RLE  to improve safety within 7 treatment day(s). (5.) Nadia Maki will perform therapeutic exercises x 15 min for HEP with INDEPENDENCE to improve strength, endurance, and functional mobility within 7 treatment day(s).      PHYSICAL THERAPY: Daily Note and AM Treatment Day # 4    Nadia Maki is a 80 y.o. male   PRIMARY DIAGNOSIS: Ankle fracture, right  Closed trimalleolar fracture of right ankle, initial encounter [S82.851A]  Ankle fracture, right [S82.891A]  Procedure(s) (LRB):  RIGHT ANKLE OPEN REDUCTION INTERNAL FIXATION WITH POSS SYNDESMOTIC SCREW CHOICE ANES (Right)  4 Days Post-Op    ASSESSMENT:     REHAB RECOMMENDATIONS: CURRENT LEVEL OF FUNCTION:  (Most Recently Demonstrated)   Recommendation to date pending progress:  Setting:   Short-term Rehab  Equipment:    To Be Determined Bed Mobility:   Contact Guard Assistance  Sit to Stand:   Minimal Assistance x 2  Transfers:   Minimal Assistance x 2  Gait/Mobility:   Unable to perform     ASSESSMENT:  Mr. Barbie Donato is a 80year old M who presents s/p RLE ankle ORIF after he sustained a R ankle fx when he slipped on some acorns. NWB RLE.    Today, patient is making slow progress toward goals. Improved bed mobility and decreased assistance with transfers and SPT. He does continue to require constant cueing for posture and maintaining NWB status. Tends to push the walker too far away and put weight on the foot during pivoting. SUBJECTIVE:   Mr. Francesca Cerda states, \"I am going to rehab\"    SOCIAL HISTORY/ LIVING ENVIRONMENT:  Lives with his spouse (who is in poor health), typically active and independent at baseline. 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 Lives in a one story home, states there are stairs but also a ramp he can use to enter.    Home Environment: Private residence  One/Two Story Residence: One story  Living Alone: No  Support Systems: Other Family Member(s), Child(nathaly), Spouse/Significant Other  OBJECTIVE:     PAIN: VITAL SIGNS: LINES/DRAINS:   Pre Treatment: Pain Screen  Pain Scale 1: Numeric (0 - 10)  Pain Intensity 1: 0  Post Treatment: 0   None  O2 Device: None (Room air) (Pt removed CPAP)     MOBILITY: I Mod I S SBA CGA Min Mod Max Total  NT x2 Comments:   Bed Mobility    Rolling [] [] [] [] [x] [] [] [] [] [] []    Supine to Sit [] [] [] [] [x] [] [] [] [] [] []    Scooting [] [] [] [] [x] [] [] [] [] [] []    Sit to Supine [] [] [] [] [] [] [] [] [] [] []    Transfers    Sit to Stand [] [] [] [] [] [x] [] [] [] [] [x]    Bed to Chair [] [] [] [] [] [x] [] [] [] [] [x]    Stand to Sit [] [] [] [] [] [x] [] [] [] [] [x]    I=Independent, Mod I=Modified Independent, S=Supervision, SBA=Standby Assistance, CGA=Contact Guard Assistance,   Min=Minimal Assistance, Mod=Moderate Assistance, Max=Maximal Assistance, Total=Total Assistance, NT=Not Tested    BALANCE: Good Fair+ Fair Fair- Poor NT Comments   Sitting Static [x] [] [] [] [] []    Sitting Dynamic [x] [] [] [] [] []              Standing Static [] [] [] [x] [x] []    Standing Dynamic [] [] [] [] [x] []      GAIT: I Mod I S SBA CGA Min Mod Max Total  NT x2 Comments:   Level of Assistance [] [] [] [] [] [] [] [] [] [x] []    Distance N/A    DME Rolling Walker and Gait Belt    Gait Quality N/A    Weightbearing  Status NWB RLE    I=Independent, Mod I=Modified Independent, S=Supervision, SBA=Standby Assistance, CGA=Contact Guard Assistance,   Min=Minimal Assistance, Mod=Moderate Assistance, Max=Maximal Assistance, Total=Total Assistance, NT=Not Tested    PLAN:   FREQUENCY/DURATION: PT Plan of Care: BID for duration of hospital stay or until stated goals are met, whichever comes first.  TREATMENT:     TREATMENT:   ($$ Therapeutic Activity: 23-37 mins    )  Therapeutic Activity (32 Minutes): Therapeutic activity included Rolling, Supine to Sit, Scooting, Transfer Training, Sitting balance  and Standing balance to improve functional Mobility, Strength, ROM and Activity tolerance.     TREATMENT GRID:  N/A    AFTER TREATMENT POSITION/PRECAUTIONS:  Chair, Needs within reach and RN notified    INTERDISCIPLINARY COLLABORATION:  RN/PCT, PT/PTA and Rehab Attendant     TOTAL TREATMENT DURATION:  PT Patient Time In/Time Out  Time In: 1010  Time Out: [address], PTA

## 2021-10-30 NOTE — PROGRESS NOTES
Problem: Falls - Risk of  Goal: *Absence of Falls  Description: Document Fili Led Fall Risk and appropriate interventions in the flowsheet.   Outcome: Progressing Towards Goal  Note: Fall Risk Interventions:  Mobility Interventions: Patient to call before getting OOB         Medication Interventions: Evaluate medications/consider consulting pharmacy    Elimination Interventions: Bed/chair exit alarm    History of Falls Interventions: Bed/chair exit alarm         Problem: Patient Education: Go to Patient Education Activity  Goal: Patient/Family Education  Outcome: Progressing Towards Goal     Problem: Patient Education: Go to Patient Education Activity  Goal: Patient/Family Education  Outcome: Progressing Towards Goal     Problem: Patient Education: Go to Patient Education Activity  Goal: Patient/Family Education  Outcome: Progressing Towards Goal     Problem: Patient Education: Go to Patient Education Activity  Goal: Patient/Family Education  Outcome: Progressing Towards Goal

## 2021-10-30 NOTE — PROGRESS NOTES
Problem: Patient Education: Go to Patient Education Activity  Goal: Patient/Family Education  10/30/2021 0722 by Mary Correa RN  Outcome: Progressing Towards Goal  10/29/2021 1820 by Mary Correa, RN  Outcome: Progressing Towards Goal

## 2021-10-30 NOTE — PROGRESS NOTES
October 30, 2021         Post Op day: 4 Days Post-Op   Admit Diagnosis: Closed trimalleolar fracture of right ankle, initial encounter [S82.252X]  Ankle fracture, right [S82.702M]  LAB:    Recent Results (from the past 24 hour(s))   GLUCOSE, POC    Collection Time: 10/29/21  3:36 PM   Result Value Ref Range    Glucose (POC) 243 (H) 65 - 100 mg/dL    Performed by Altagracia    GLUCOSE, POC    Collection Time: 10/29/21  9:18 PM   Result Value Ref Range    Glucose (POC) 267 (H) 65 - 100 mg/dL    Performed by Alisha    GLUCOSE, POC    Collection Time: 10/30/21  6:26 AM   Result Value Ref Range    Glucose (POC) 229 (H) 65 - 100 mg/dL    Performed by Alisha    CBC W/O DIFF    Collection Time: 10/30/21  8:13 AM   Result Value Ref Range    WBC 5.4 4.3 - 11.1 K/uL    RBC 3.78 (L) 4.23 - 5.6 M/uL    HGB 12.8 (L) 13.6 - 17.2 g/dL    HCT 37.7 (L) 41.1 - 50.3 %    MCV 99.7 (H) 79.6 - 97.8 FL    MCH 33.9 (H) 26.1 - 32.9 PG    MCHC 34.0 31.4 - 35.0 g/dL    RDW 13.0 11.9 - 14.6 %    PLATELET 528 (L) 591 - 450 K/uL    MPV 9.9 9.4 - 12.3 FL    ABSOLUTE NRBC 0.00 0.0 - 0.2 K/uL   METABOLIC PANEL, COMPREHENSIVE    Collection Time: 10/30/21  8:13 AM   Result Value Ref Range    Sodium 133 (L) 136 - 145 mmol/L    Potassium 4.2 3.5 - 5.1 mmol/L    Chloride 101 98 - 107 mmol/L    CO2 26 21 - 32 mmol/L    Anion gap 6 (L) 7 - 16 mmol/L    Glucose 306 (H) 65 - 100 mg/dL    BUN 28 (H) 8 - 23 MG/DL    Creatinine 1.20 0.8 - 1.5 MG/DL    GFR est AA >60 >60 ml/min/1.73m2    GFR est non-AA >60 >60 ml/min/1.73m2    Calcium 8.8 8.3 - 10.4 MG/DL    Bilirubin, total 1.6 (H) 0.2 - 1.1 MG/DL    ALT (SGPT) 62 12 - 65 U/L    AST (SGOT) 56 (H) 15 - 37 U/L    Alk.  phosphatase 129 50 - 136 U/L    Protein, total 6.3 6.3 - 8.2 g/dL    Albumin 2.7 (L) 3.2 - 4.6 g/dL    Globulin 3.6 (H) 2.3 - 3.5 g/dL    A-G Ratio 0.8 (L) 1.2 - 3.5     MAGNESIUM    Collection Time: 10/30/21  8:13 AM   Result Value Ref Range    Magnesium 2.0 1.8 - 2.4 mg/dL   GLUCOSE, POC    Collection Time: 10/30/21 11:20 AM   Result Value Ref Range    Glucose (POC) 313 (H) 65 - 100 mg/dL    Performed by Altagracia      Vital Signs:    Patient Vitals for the past 8 hrs:   BP Temp Pulse Resp SpO2   10/30/21 0706 110/66 99.5 °F (37.5 °C) 69 17 97 %   10/30/21 0356 128/74 98.6 °F (37 °C) 69 18 97 %     Temp (24hrs), Av.7 °F (37.1 °C), Min:97.2 °F (36.2 °C), Max:99.6 °F (37.6 °C)    Pain Control:   Pain Assessment  Pain Scale 1: Numeric (0 - 10)  Pain Intensity 1: 0  Pain Onset 1: postop  Pain Location 1: Ankle  Pain Orientation 1: Right  Pain Description 1: Shooting  Pain Intervention(s) 1: Medication (see MAR)  Subjective: Doing well, no complaints     Objective:  No Acute Distress, Alert and Oriented,  RLE splint is C/D/I.  Neurovascular exam is normal       Assessment:   Patient Active Problem List   Diagnosis Code    Disorder of fluid or electrolyte E87.8    Arthritis M19.90    Nontoxic uninodular goiter E04.1    Polyarthritis/Polyarthropathy M13.0    Malaise and fatigue R53.81, R53.83    Pain in limb M79.609    Anemia D64.9    DM (diabetes mellitus), type 2, uncontrolled ([facility] [address] E11.65    BPH (benign prostatic hyperplasia) N40.0    Hypercholesterolemia E78.00    Blood clotting disorder (HCC) D68.9    GERD (gastroesophageal reflux disease) K21.9    Acquired hypothyroidism E03.9    Abnormal clinical finding R68.89    Hypertension, essential, benign I10    Hyperglyceridemia, pure E78.1    Dizziness and giddiness R42    RLS (restless legs syndrome) G25.81    HERNÁN on CPAP G47.33, Z99.89    Diabetic neuropathy, painful (HCC) E11.40    Hip tendinitis M76.899    Lumbar stenosis with neurogenic claudication M48.062    PLMD (periodic limb movement disorder) G47.61    Muscle spasm of back M62.830    Arteriosclerosis of carotid artery I65.29    Radiculopathy, lumbosacral region M54.17    Peripheral neuropathy G62.9    Hematoma, subungual, great toe, left I34.871B    Dyslipidemia E78.5    Mononeuritis of lower extremity G57.90    Joint derangement M24.9    Leukemia (HCC) C95.90    Type 2 diabetes mellitus with hyperglycemia, without long-term current use of insulin (HCC) E11.65    Onychomycosis B35.1    Hyperlipidemia due to type 2 diabetes mellitus (HCC) E11.69, E78.5    Precordial pain R07.2    Hypersomnia G47.10    Obesity (BMI 30.0-34. 9) E66.9    Nocturia R35.1    Ankle fracture, right S82.891A       Status Post Procedure(s) (LRB):  RIGHT ANKLE OPEN REDUCTION INTERNAL FIXATION WITH POSS SYNDESMOTIC SCREW CHOICE ANES (Right)        Plan: Continue PT/OT- NWB on RLE. Lovenox for DVT prophylaxis. Scheduled for d/c to rehab today.    Signed By: JACKY De Jesus

## 2021-10-30 NOTE — PROGRESS NOTES
The patient is medically stable for discharge. Patient to discharge to [facility]  for [employer]. Patient's room number is 415 and report line is [phone]. [facility] scheduled for [facility]. Primary nurse notified of this information. Patient's daughter Jett Lewis aware of discharge. CM remains available. Care Management Interventions  PCP Verified by CM:  Yes  Mode of Transport at Discharge: BLS  Transition of Care Consult (CM Consult): SNF  Partner SNF: Yes  Discharge Durable Medical Equipment: No  Physical Therapy Consult: Yes  Occupational Therapy Consult: Yes  Speech Therapy Consult: No  Support Systems: Other Family Member(s), Child(nathaly), Spouse/Significant Other  Confirm Follow Up Transport: Family  The Plan for Transition of Care is Related to the Following Treatment Goals : STR to improve pt's strength and functional abilities for a safe transition to home  The Patient and/or Patient Representative was Provided with a Choice of Provider and Agrees with the Discharge Plan?: Yes  Freedom of Choice List was Provided with Basic Dialogue that Supports the Patient's Individualized Plan of Care/Goals, Treatment Preferences and Shares the Quality Data Associated with the Providers?: Yes  Discharge Location  Discharge Placement: Rehab Unit [facility]

## 2021-12-22 ENCOUNTER — APPOINTMENT (RX ONLY)
Dept: URBAN - METROPOLITAN AREA CLINIC 349 | Facility: CLINIC | Age: 81
Setting detail: DERMATOLOGY
End: 2021-12-22

## 2021-12-22 DIAGNOSIS — Z12.83 ENCOUNTER FOR SCREENING FOR MALIGNANT NEOPLASM OF SKIN: ICD-10-CM

## 2021-12-22 DIAGNOSIS — L82.1 OTHER SEBORRHEIC KERATOSIS: ICD-10-CM

## 2021-12-22 DIAGNOSIS — Z85.828 PERSONAL HISTORY OF OTHER MALIGNANT NEOPLASM OF SKIN: ICD-10-CM

## 2021-12-22 DIAGNOSIS — D485 NEOPLASM OF UNCERTAIN BEHAVIOR OF SKIN: ICD-10-CM

## 2021-12-22 DIAGNOSIS — L57.0 ACTINIC KERATOSIS: ICD-10-CM

## 2021-12-22 DIAGNOSIS — L57.8 OTHER SKIN CHANGES DUE TO CHRONIC EXPOSURE TO NONIONIZING RADIATION: ICD-10-CM

## 2021-12-22 PROBLEM — D48.5 NEOPLASM OF UNCERTAIN BEHAVIOR OF SKIN: Status: ACTIVE | Noted: 2021-12-22

## 2021-12-22 PROBLEM — H91.90 UNSPECIFIED HEARING LOSS, UNSPECIFIED EAR: Status: ACTIVE | Noted: 2021-12-22

## 2021-12-22 PROCEDURE — ? LIQUID NITROGEN

## 2021-12-22 PROCEDURE — ? COUNSELING

## 2021-12-22 PROCEDURE — 99213 OFFICE O/P EST LOW 20 MIN: CPT | Mod: 25

## 2021-12-22 PROCEDURE — A4550 SURGICAL TRAYS: HCPCS

## 2021-12-22 PROCEDURE — 11102 TANGNTL BX SKIN SINGLE LES: CPT | Mod: 59

## 2021-12-22 PROCEDURE — 11103 TANGNTL BX SKIN EA SEP/ADDL: CPT

## 2021-12-22 PROCEDURE — 17004 DESTROY PREMAL LESIONS 15/>: CPT

## 2021-12-22 PROCEDURE — ? BIOPSY BY SHAVE METHOD

## 2021-12-22 ASSESSMENT — LOCATION SIMPLE DESCRIPTION DERM
LOCATION SIMPLE: LEFT FOREHEAD
LOCATION SIMPLE: RIGHT EAR
LOCATION SIMPLE: SUPERIOR FOREHEAD
LOCATION SIMPLE: RIGHT CHEEK
LOCATION SIMPLE: LEFT CHEEK
LOCATION SIMPLE: RIGHT FOREHEAD
LOCATION SIMPLE: LEFT EAR
LOCATION SIMPLE: LEFT SCALP
LOCATION SIMPLE: RIGHT FOREHEAD
LOCATION SIMPLE: LEFT EAR

## 2021-12-22 ASSESSMENT — LOCATION ZONE DERM
LOCATION ZONE: SCALP
LOCATION ZONE: EAR
LOCATION ZONE: FACE
LOCATION ZONE: FACE
LOCATION ZONE: EAR

## 2021-12-22 ASSESSMENT — LOCATION DETAILED DESCRIPTION DERM
LOCATION DETAILED: RIGHT ANTITRAGUS
LOCATION DETAILED: RIGHT SUPERIOR HELIX
LOCATION DETAILED: LEFT TRAGUS
LOCATION DETAILED: RIGHT ANTIHELIX
LOCATION DETAILED: LEFT SCAPHA
LOCATION DETAILED: RIGHT FOREHEAD
LOCATION DETAILED: RIGHT POSTERIOR EAR
LOCATION DETAILED: RIGHT SUPERIOR FOREHEAD
LOCATION DETAILED: LEFT SUPERIOR POSTERIOR HELIX
LOCATION DETAILED: LEFT SUPERIOR MEDIAL FOREHEAD
LOCATION DETAILED: RIGHT SCAPHA
LOCATION DETAILED: SUPERIOR MID FOREHEAD
LOCATION DETAILED: RIGHT ANTERIOR EARLOBE
LOCATION DETAILED: LEFT MID PREAURICULAR CHEEK
LOCATION DETAILED: RIGHT INFERIOR POSTERIOR HELIX
LOCATION DETAILED: RIGHT SUPERIOR FOREHEAD
LOCATION DETAILED: LEFT SUPERIOR LATERAL MALAR CHEEK
LOCATION DETAILED: LEFT CENTRAL FRONTAL SCALP
LOCATION DETAILED: LEFT SCAPHA
LOCATION DETAILED: RIGHT CENTRAL MALAR CHEEK
LOCATION DETAILED: LEFT SUPERIOR CRUS OF ANTIHELIX
LOCATION DETAILED: LEFT INFERIOR FOREHEAD
LOCATION DETAILED: LEFT CENTRAL MALAR CHEEK
LOCATION DETAILED: LEFT SUPERIOR FOREHEAD
LOCATION DETAILED: RIGHT SUPERIOR POSTERIOR HELIX

## 2021-12-22 NOTE — PROCEDURE: BIOPSY BY SHAVE METHOD
Information: Selecting Yes will display possible errors in your note based on the variables you have selected. This validation is only offered as a suggestion for you. PLEASE NOTE THAT THE VALIDATION TEXT WILL BE REMOVED WHEN YOU FINALIZE YOUR NOTE. IF YOU WANT TO FAX A PRELIMINARY NOTE YOU WILL NEED TO TOGGLE THIS TO 'NO' IF YOU DO NOT WANT IT IN YOUR FAXED NOTE.
Dressing: bandage
Anesthesia Volume In Cc (Will Not Render If 0): 0.5
Validate Anticipated Plan: No
Post-Care Instructions: I reviewed with the patient in detail post-care instructions. Patient is to keep the biopsy site dry overnight, and then apply bacitracin twice daily until healed. Patient may apply hydrogen peroxide soaks to remove any crusting.  After the procedure, the patient was observed for 5-10 minutes and was oriented to person, place and time and demied feeling dizzy, queasy, and stated that they did not feel that they were going to faint.
Biopsy Type: H and E
X Size Of Lesion In Cm: 0
Detail Level: Detailed
Billing Type: Third-Party Bill
Silver Nitrate Text: The wound bed was treated with silver nitrate after the biopsy was performed.
Size Of Lesion In Cm: 0.8
Biopsy Method: Dermablade
Electrodesiccation And Curettage Text: The wound bed was treated with electrodesiccation and curettage after the biopsy was performed.
Type Of Destruction Used: Curettage
Hemostasis: Aluminum Chloride
Electrodesiccation Text: The wound bed was treated with electrodesiccation after the biopsy was performed.
Cryotherapy Text: The wound bed was treated with cryotherapy after the biopsy was performed.
Consent: Written consent was obtained and risks were reviewed including but not limited to scarring, infection, bleeding, scabbing, incomplete removal, nerve damage and allergy to anesthesia.
Depth Of Biopsy: dermis
Bill For Surgical Tray: yes
Anesthesia Type: 2% lidocaine with epinephrine
Wound Care: Vaseline
Notification Instructions: Patient will be notified of biopsy results. However, patient instructed to call the office if not contacted within 2 weeks.

## 2021-12-22 NOTE — PROCEDURE: LIQUID NITROGEN
Consent: The patient's consent was obtained including but not limited to risks of crusting, scabbing, blistering, scarring, darker or lighter pigmentary change, recurrence, incomplete removal and infection.
Number Of Freeze-Thaw Cycles: 2 freeze-thaw cycles
Detail Level: Detailed
Duration Of Freeze Thaw-Cycle (Seconds): 3
Render Note In Bullet Format When Appropriate: No
Post-Care Instructions: I reviewed with the patient in detail post-care instructions. Patient is to wear sunprotection, and avoid picking at any of the treated lesions. Pt may apply Vaseline to crusted or scabbing areas.
Show Applicator Variable?: Yes

## 2022-02-09 ENCOUNTER — APPOINTMENT (RX ONLY)
Dept: URBAN - METROPOLITAN AREA CLINIC 349 | Facility: CLINIC | Age: 82
Setting detail: DERMATOLOGY
End: 2022-02-09

## 2022-02-09 PROBLEM — D04.39 CARCINOMA IN SITU OF SKIN OF OTHER PARTS OF FACE: Status: ACTIVE | Noted: 2022-02-09

## 2022-02-09 PROCEDURE — 17283 DSTR MAL LS F/E/E/N/L/M2.1-3: CPT

## 2022-02-09 PROCEDURE — ? CURETTAGE AND DESTRUCTION

## 2022-02-09 NOTE — PROCEDURE: CURETTAGE AND DESTRUCTION
Size Of Lesion After Curettage: 2.3
Render Post-Care Instructions In Note?: no
Consent was obtained from the patient. The risks, benefits and alternatives to therapy were discussed in detail. Specifically, the risks of infection, scarring, bleeding, prolonged wound healing, nerve injury, incomplete removal, allergy to anesthesia and recurrence were addressed. Alternatives to ED&C, such as: surgical removal and XRT were also discussed.  Prior to the procedure, the treatment site was clearly identified and confirmed by the patient. All components of Universal Protocol/PAUSE Rule completed.
Size Of Lesion In Cm: 1
Final Size Statement: The size of the lesion after curettage was
Number Of Curettages: 3
Anesthesia Type: 2% lidocaine with epinephrine
Bill As A Line Item Or As Units: Line Item
Post-Care Instructions: I reviewed with the patient in detail post-care instructions. Patient is to keep the area dry for 48 hours, and not to engage in any swimming until the area is healed. Should the patient develop any fevers, chills, bleeding, severe pain patient will contact the office immediately. After the procedure, the patient was observed for 5-10 mins and was oriented to person, place anddenied feeling dizzy, queasy and stated that they did not feel that they were going to faint.            denied
Additional Information: (Optional): The wound was cleaned, and a pressure dressing was applied.  The patient received detailed post-op instructions.
Cautery Type: electrodesiccation
What Was Performed First?: Curettage
Detail Level: Detailed

## 2022-03-18 ENCOUNTER — TRANSCRIBE ORDER (OUTPATIENT)
Dept: SCHEDULING | Age: 82
End: 2022-03-18

## 2022-03-18 DIAGNOSIS — K76.9 LIVER LESION: Primary | ICD-10-CM

## 2022-03-18 PROBLEM — E66.811 OBESITY (BMI 30.0-34.9): Status: ACTIVE | Noted: 2021-03-02

## 2022-03-18 PROBLEM — E66.9 OBESITY (BMI 30.0-34.9): Status: ACTIVE | Noted: 2021-03-02

## 2022-03-19 PROBLEM — R07.2 PRECORDIAL PAIN: Status: ACTIVE | Noted: 2018-06-28

## 2022-03-19 PROBLEM — E11.69 HYPERLIPIDEMIA DUE TO TYPE 2 DIABETES MELLITUS (HCC): Status: ACTIVE | Noted: 2018-02-28

## 2022-03-19 PROBLEM — S82.891A ANKLE FRACTURE, RIGHT: Status: ACTIVE | Noted: 2021-10-26

## 2022-03-19 PROBLEM — E78.5 HYPERLIPIDEMIA DUE TO TYPE 2 DIABETES MELLITUS (HCC): Status: ACTIVE | Noted: 2018-02-28

## 2022-03-19 PROBLEM — B35.1 ONYCHOMYCOSIS: Status: ACTIVE | Noted: 2017-08-01

## 2022-03-19 PROBLEM — R35.1 NOCTURIA: Status: ACTIVE | Noted: 2021-03-02

## 2022-03-19 PROBLEM — G47.10 HYPERSOMNIA: Status: ACTIVE | Noted: 2021-03-02

## 2022-03-20 PROBLEM — E11.65 TYPE 2 DIABETES MELLITUS WITH HYPERGLYCEMIA, WITHOUT LONG-TERM CURRENT USE OF INSULIN (HCC): Status: ACTIVE | Noted: 2021-10-26

## 2022-03-29 ENCOUNTER — HOSPITAL ENCOUNTER (OUTPATIENT)
Dept: CT IMAGING | Age: 82
Discharge: HOME | End: 2022-03-29
Attending: NURSE PRACTITIONER
Payer: MEDICARE

## 2022-03-29 DIAGNOSIS — K76.9 LIVER LESION: ICD-10-CM

## 2022-03-29 LAB — CREAT BLD-MCNC: 0.99 MG/DL (ref 0.8–1.5)

## 2022-03-29 PROCEDURE — 74178 CT ABD&PLV WO CNTR FLWD CNTR: CPT

## 2022-03-29 PROCEDURE — 74011000636 HC RX REV CODE- 636: Performed by: NURSE PRACTITIONER

## 2022-03-29 PROCEDURE — 82565 ASSAY OF CREATININE: CPT

## 2022-03-29 PROCEDURE — 74011000258 HC RX REV CODE- 258: Performed by: NURSE PRACTITIONER

## 2022-03-29 RX ORDER — SODIUM CHLORIDE 0.9 % (FLUSH) 0.9 %
10 SYRINGE (ML) INJECTION
Status: COMPLETED | OUTPATIENT
Start: 2022-03-29 | End: 2022-03-29

## 2022-03-29 RX ADMIN — DIATRIZOATE MEGLUMINE AND DIATRIZOATE SODIUM 15 ML: 660; 100 LIQUID ORAL; RECTAL at 09:20

## 2022-03-29 RX ADMIN — Medication 10 ML: at 09:20

## 2022-03-29 RX ADMIN — IOPAMIDOL 100 ML: 755 INJECTION, SOLUTION INTRAVENOUS at 09:19

## 2022-03-29 RX ADMIN — SODIUM CHLORIDE 100 ML: 9 INJECTION, SOLUTION INTRAVENOUS at 09:19

## 2022-05-26 ENCOUNTER — HOSPITAL ENCOUNTER (OUTPATIENT)
Dept: NUTRITION | Age: 82
Discharge: HOME | End: 2022-05-29
Payer: MEDICARE

## 2022-05-26 PROCEDURE — 97802 MEDICAL NUTRITION INDIV IN: CPT

## 2022-05-26 NOTE — PROGRESS NOTES
Lori Flood, MS,  RD, LD  Outpatient Registered Dietitian  [address] [facility]  Phone: [phone]  Fax: [phone]    ASSESSMENT  Pt is a 80 y.o. male referred by gastroenterology with the following diagnosis (es):   Obesity (BMI 30-39. 9) E66.9  Cirrhosis of liver without ascites (K74.60)  DM: suboptimal glycemic control, A1C= 7.3. Monitors BG using FSBS intermittently. Did not check BG this AM, checked a few days ago, stated home fasting was 160mg/dL. Medications managed by PCP current regimen inludes: Jardiance, Metformin XR, and Actos. States he takes his medications as prescribed. Patient stated nutrition goal: Weight loss, goal weight 180 pounds, current in office weight is 207 pounds. Motivation- grandchild's wedding in November. Eating behaviors and factors impacting food choices:    established food preferences/eating behaviors   dining out more than 50% of meals-    family food preferences: voices spouse and daughter influence foods consumed in the home. \"wife likes pillsbury biscuits\" - consumes 3 for breakfast.    diet culture: \"bariatric diet\", followed \"all\" except for weight watchers    Initial Diet Recall  (written)  Breakfast: 1 egg + egg whites, coffee with cream  Lunch: [employer] spicy chicken sandwich with french fries  Snack: Yevgeniy cheese crackers + banana  Dinner: pork chop, cottage cheese, pineapple, hotsauce/ ketchup, water  Beverages: water, diet drinks    Baseline eating pattern appears inadequate in carbohydrate, fiber and vegetables and excessive in saturated fat, sodium and refined carbohydrate     Lifestyle/Family Influence/Support: s/p ORIF right ankle after fall. Working with physical therapy, unstable gait observed. C/o SOB when he walks. Would like to increase his PA. 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 Spouse s/p bariatric surgery. Stage of Change: Preparation.     Patient Active Problem List   Diagnosis    Obesity (BMI 30.0-34. 9)    Blood clotting disorder (HCC)    Arteriosclerosis of carotid artery    Malaise and fatigue    Dizziness and giddiness    Joint derangement    Hyperglyceridemia, pure    Muscle spasm of back    MERT on CPAP    Onychomycosis    Nontoxic uninodular goiter    Anemia    GERD (gastroesophageal reflux disease)    Hypercholesterolemia    Lumbar stenosis with neurogenic claudication    Peripheral neuropathy    Hematoma, subungual, great toe, left    Diabetic neuropathy, painful (HCC)    Pain in limb    Abnormal clinical finding    Acquired hypothyroidism    Hypersomnia    Ankle fracture, right    Nocturia    Disorder of fluid or electrolyte    Mononeuritis of lower extremity    Hyperlipidemia due to type 2 diabetes mellitus (HCC)    RLS (restless legs syndrome)    Dyslipidemia    Precordial pain    Leukemia (HCC)    BPH (benign prostatic hyperplasia)    PLMD (periodic limb movement disorder)    DM (diabetes mellitus), type 2, uncontrolled (HCC)    Type 2 diabetes mellitus with hyperglycemia, without long-term current use of insulin ([facility])    Polyarthritis    Arthritis    Hypertension, essential, benign    Hip tendinitis    Radiculopathy, lumbosacral region       Anthropometrics:   Estimated body mass index is 30.68 kg/m² as calculated from the following:    Height as of 10/26/21: 5' 11\" (1.803 m). Weight as of 4/6/22: 220 lb (99.8 kg). Estimated Nutrition Needs:  MSJ x 1.2= 2000- 200= 1800 kcal/day for weight reduction      Protein: 80-100g/day (20%) Carbohydrates: 180-200g per day. DIAGNOSIS:  Unbalanced diet related to nutrition knowledge deficit as evidenced by eating pattern as above.      INTERVENTION:  Goal: 1-2# weight loss/week    Recommendations:  -25g+ fiber/day- swap from biscuits to higher fiber bread/oatmeal for breakfast.  - 55-60 grams of total carbohydrates per meal  -Increase phytonutrient intake with variety of color at meals  Aim to include 2 servings of fruits and 3 cups of non starchy vegetables per day  -Less than 25g added sugar/day- try true lemon/lime in water. , and reduce sweetened beverages.   -Track meals for 3 days and record blood sugars on tracking too      Nutrition Education and Counseling (60minutes):  Discussed nutrition principles for weight management and focus on food quality/nutrition density of food choices. Reviewed recall and provided recommendations for change, provided strategies for increasing protein and fiber at meals. Provided goals for added sugar/day and protein/meal. Reviewed Diabetes My Plate as template for meal planning. Answered questions. Affirmed changes made thus far to lifestyle. Reviewed ADA blood glucose/ A1C targets. Addressed cognitive distortions regarding weight and nutrition. Utilized the following behavior change strategies: MI, goal setting. Materials Provided and Discussed:  Fiber, meal planning, protein/carbohydrate snacks     Patient verbalized understanding of recommendations discussed. MONITORING & EVALUATION:  Monitor Food and Nutrient Intake and Anthropometrics  Follow Up: 3 weeks with tracked meals.

## 2022-06-01 ENCOUNTER — PREP FOR PROCEDURE (OUTPATIENT)
Dept: ADMINISTRATIVE | Age: 82
End: 2022-06-01

## 2022-06-01 RX ORDER — SODIUM CHLORIDE 0.9 % (FLUSH) 0.9 %
5-40 SYRINGE (ML) INJECTION PRN
OUTPATIENT
Start: 2022-06-01

## 2022-06-01 RX ORDER — SODIUM CHLORIDE 9 MG/ML
INJECTION, SOLUTION INTRAVENOUS PRN
OUTPATIENT
Start: 2022-06-01

## 2022-06-01 RX ORDER — SODIUM CHLORIDE 0.9 % (FLUSH) 0.9 %
5-40 SYRINGE (ML) INJECTION EVERY 12 HOURS SCHEDULED
OUTPATIENT
Start: 2022-06-01

## 2022-06-09 ENCOUNTER — ANESTHESIA EVENT (OUTPATIENT)
Dept: ENDOSCOPY | Age: 82
End: 2022-06-09
Payer: MEDICARE

## 2022-06-09 RX ORDER — DEXTROSE MONOHYDRATE 50 MG/ML
100 INJECTION, SOLUTION INTRAVENOUS PRN
Status: CANCELLED | OUTPATIENT
Start: 2022-06-09

## 2022-06-09 RX ORDER — MULTIVITAMIN,THERAPEUTIC
TABLET ORAL DAILY
COMMUNITY

## 2022-06-09 RX ORDER — SODIUM CHLORIDE 9 MG/ML
INJECTION, SOLUTION INTRAVENOUS PRN
Status: CANCELLED | OUTPATIENT
Start: 2022-06-09

## 2022-06-09 RX ORDER — SODIUM CHLORIDE 0.9 % (FLUSH) 0.9 %
5-40 SYRINGE (ML) INJECTION EVERY 12 HOURS SCHEDULED
Status: CANCELLED | OUTPATIENT
Start: 2022-06-09

## 2022-06-09 RX ORDER — SODIUM CHLORIDE, SODIUM LACTATE, POTASSIUM CHLORIDE, CALCIUM CHLORIDE 600; 310; 30; 20 MG/100ML; MG/100ML; MG/100ML; MG/100ML
INJECTION, SOLUTION INTRAVENOUS CONTINUOUS
Status: CANCELLED | OUTPATIENT
Start: 2022-06-09

## 2022-06-09 RX ORDER — SODIUM CHLORIDE 0.9 % (FLUSH) 0.9 %
5-40 SYRINGE (ML) INJECTION PRN
Status: CANCELLED | OUTPATIENT
Start: 2022-06-09

## 2022-06-09 RX ORDER — GLUCAGON 1 MG/ML
1 KIT INJECTION PRN
Status: CANCELLED | OUTPATIENT
Start: 2022-06-09

## 2022-06-09 RX ORDER — CHLORAL HYDRATE 500 MG
1 CAPSULE ORAL DAILY
COMMUNITY

## 2022-06-09 NOTE — PERIOP NOTE
Patient verified name, , and procedure. Type: 1a; abbreviated assessment per anesthesia guidelines    Labs per anesthesia: SQBS  DOS    Instructed pt that they will be notified the day before their procedure by the GI Lab for time of arrival [facility] [place] [facility]. Arrival times should be called by 5 pm. If no phone is received the patient should contact their respective hospital. The GI lab telephone number is [phone] and ES Pre-op is [phone]. Follow diet and prep instructions per office including NPO status. If patient has NOT received instructions from office patient is advised to call surgeon office, verbalizes understanding. Bath or shower the night before and the am of surgery with non-mositurizing soap. No lotions, oils, powders, cologne on skin. No make up, eye make up or jewelry. Wear loose fitting comfortable, clean clothing. Must have adult present in building the entire time . Medications for the day of procedure: levothyroxine, patient to hold: all vitamins, jardiance, lisinopril/HCT, meclizine, metformin, Actos. The following discharge instructions reviewed with patient: medication given during procedure may cause drowsiness for several hours, therefore, do not drive or operate machinery for remainder of the day. You may not drink alcohol on the day of your procedure, please resume regular diet and activity unless otherwise directed. You may experience abdominal distention for several hours that is relieved by the passage of gas. Contact your physician if you have any of the following: fever or chills, severe abdominal pain or excessive amount of bleeding or a large amount when having a bowel movement.  Occasional specks of blood with bowel movement would not be unusual.

## 2022-06-10 ENCOUNTER — ANESTHESIA (OUTPATIENT)
Dept: ENDOSCOPY | Age: 82
End: 2022-06-10
Payer: MEDICARE

## 2022-06-10 ENCOUNTER — HOSPITAL ENCOUNTER (OUTPATIENT)
Age: 82
Setting detail: OUTPATIENT SURGERY
Discharge: HOME | End: 2022-06-10
Attending: INTERNAL MEDICINE | Admitting: INTERNAL MEDICINE
Payer: MEDICARE

## 2022-06-10 VITALS
TEMPERATURE: 97.7 F | HEIGHT: 71 IN | BODY MASS INDEX: 28.98 KG/M2 | SYSTOLIC BLOOD PRESSURE: 126 MMHG | RESPIRATION RATE: 16 BRPM | WEIGHT: 207 LBS | OXYGEN SATURATION: 100 % | DIASTOLIC BLOOD PRESSURE: 69 MMHG | HEART RATE: 73 BPM

## 2022-06-10 LAB
GLUCOSE BLD STRIP.AUTO-MCNC: 168 MG/DL (ref 65–100)
SERVICE CMNT-IMP: ABNORMAL

## 2022-06-10 PROCEDURE — 88312 SPECIAL STAINS GROUP 1: CPT

## 2022-06-10 PROCEDURE — 3700000001 HC ADD 15 MINUTES (ANESTHESIA): Performed by: INTERNAL MEDICINE

## 2022-06-10 PROCEDURE — 7100000010 HC PHASE II RECOVERY - FIRST 15 MIN: Performed by: INTERNAL MEDICINE

## 2022-06-10 PROCEDURE — 2580000003 HC RX 258: Performed by: ANESTHESIOLOGY

## 2022-06-10 PROCEDURE — 88305 TISSUE EXAM BY PATHOLOGIST: CPT

## 2022-06-10 PROCEDURE — 2500000003 HC RX 250 WO HCPCS: Performed by: NURSE ANESTHETIST, CERTIFIED REGISTERED

## 2022-06-10 PROCEDURE — 6360000002 HC RX W HCPCS: Performed by: NURSE ANESTHETIST, CERTIFIED REGISTERED

## 2022-06-10 PROCEDURE — 3700000000 HC ANESTHESIA ATTENDED CARE: Performed by: INTERNAL MEDICINE

## 2022-06-10 PROCEDURE — 82962 GLUCOSE BLOOD TEST: CPT

## 2022-06-10 PROCEDURE — 3609010600 HC COLONOSCOPY POLYPECTOMY SNARE/COLD BIOPSY: Performed by: INTERNAL MEDICINE

## 2022-06-10 PROCEDURE — 3609012400 HC EGD TRANSORAL BIOPSY SINGLE/MULTIPLE: Performed by: INTERNAL MEDICINE

## 2022-06-10 PROCEDURE — 7100000011 HC PHASE II RECOVERY - ADDTL 15 MIN: Performed by: INTERNAL MEDICINE

## 2022-06-10 PROCEDURE — 2709999900 HC NON-CHARGEABLE SUPPLY: Performed by: INTERNAL MEDICINE

## 2022-06-10 RX ORDER — SODIUM CHLORIDE 0.9 % (FLUSH) 0.9 %
5-40 SYRINGE (ML) INJECTION EVERY 12 HOURS SCHEDULED
Status: DISCONTINUED | OUTPATIENT
Start: 2022-06-10 | End: 2022-06-10 | Stop reason: HOSPADM

## 2022-06-10 RX ORDER — PROPOFOL 10 MG/ML
INJECTION, EMULSION INTRAVENOUS PRN
Status: DISCONTINUED | OUTPATIENT
Start: 2022-06-10 | End: 2022-06-10 | Stop reason: SDUPTHER

## 2022-06-10 RX ORDER — SODIUM CHLORIDE 9 MG/ML
INJECTION, SOLUTION INTRAVENOUS PRN
Status: DISCONTINUED | OUTPATIENT
Start: 2022-06-10 | End: 2022-06-10 | Stop reason: HOSPADM

## 2022-06-10 RX ORDER — LIDOCAINE HYDROCHLORIDE 10 MG/ML
1 INJECTION, SOLUTION EPIDURAL; INFILTRATION; INTRACAUDAL; PERINEURAL
Status: DISCONTINUED | OUTPATIENT
Start: 2022-06-10 | End: 2022-06-10 | Stop reason: HOSPADM

## 2022-06-10 RX ORDER — SODIUM CHLORIDE 0.9 % (FLUSH) 0.9 %
5-40 SYRINGE (ML) INJECTION PRN
Status: DISCONTINUED | OUTPATIENT
Start: 2022-06-10 | End: 2022-06-10 | Stop reason: HOSPADM

## 2022-06-10 RX ORDER — EPHEDRINE SULFATE/0.9% NACL/PF 50 MG/5 ML
SYRINGE (ML) INTRAVENOUS PRN
Status: DISCONTINUED | OUTPATIENT
Start: 2022-06-10 | End: 2022-06-10 | Stop reason: SDUPTHER

## 2022-06-10 RX ORDER — LIDOCAINE HYDROCHLORIDE 20 MG/ML
INJECTION, SOLUTION EPIDURAL; INFILTRATION; INTRACAUDAL; PERINEURAL PRN
Status: DISCONTINUED | OUTPATIENT
Start: 2022-06-10 | End: 2022-06-10 | Stop reason: SDUPTHER

## 2022-06-10 RX ORDER — SODIUM CHLORIDE, SODIUM LACTATE, POTASSIUM CHLORIDE, CALCIUM CHLORIDE 600; 310; 30; 20 MG/100ML; MG/100ML; MG/100ML; MG/100ML
INJECTION, SOLUTION INTRAVENOUS CONTINUOUS
Status: DISCONTINUED | OUTPATIENT
Start: 2022-06-10 | End: 2022-06-10 | Stop reason: HOSPADM

## 2022-06-10 RX ADMIN — Medication 100 MG: at 10:36

## 2022-06-10 RX ADMIN — Medication 5 MG: at 10:58

## 2022-06-10 RX ADMIN — PROPOFOL 50 MG: 10 INJECTION, EMULSION INTRAVENOUS at 10:38

## 2022-06-10 RX ADMIN — SODIUM CHLORIDE: 9 INJECTION, SOLUTION INTRAVENOUS at 10:21

## 2022-06-10 RX ADMIN — Medication 10 MG: at 11:07

## 2022-06-10 RX ADMIN — SODIUM CHLORIDE, SODIUM LACTATE, POTASSIUM CHLORIDE, AND CALCIUM CHLORIDE: 600; 310; 30; 20 INJECTION, SOLUTION INTRAVENOUS at 10:34

## 2022-06-10 RX ADMIN — PROPOFOL 140 MCG/KG/MIN: 10 INJECTION, EMULSION INTRAVENOUS at 10:39

## 2022-06-10 RX ADMIN — PROPOFOL 50 MG: 10 INJECTION, EMULSION INTRAVENOUS at 10:36

## 2022-06-10 RX ADMIN — Medication 5 MG: at 10:55

## 2022-06-10 ASSESSMENT — PAIN - FUNCTIONAL ASSESSMENT: PAIN_FUNCTIONAL_ASSESSMENT: NONE - DENIES PAIN

## 2022-06-10 ASSESSMENT — ENCOUNTER SYMPTOMS: SHORTNESS OF BREATH: 1

## 2022-06-10 NOTE — PROCEDURES
Esophagogastroduodenoscopy    DATE of PROCEDURE: 6/10/2022    INDICATION:  1. Pill dysphagia  2. Liver cirrhosis    POSTPROCEDURE DIAGNOSIS:  1. Gastritis  2. Portal hypertensive gastropathy    MEDICATIONS ADMINISTERED: MAC. Further details per anesthesia note. INSTRUMENT: [id number]    PROCEDURE:  After obtaining informed consent, the patient was placed in the left lateral position and sedated. The endoscope was advanced under direct vision without difficulty to the level of the 2nd portion of the duodenum. The esophagus, stomach (including retroflexed views) and duodenum were evaluated. The patient was taken to the recovery area in stable condition. FINDINGS:  ESOPHAGUS:Exam negative for varices, ring, stricture, web,and esophagitis. Dilation performed with 54 and 60 Fr Chang bougies without disruption of mucosa. Cold-forceps biopsies obtained for pathology from mid-esophagus. Otherwise normal exam.  STOMACH: Negative gastric varices. Mild to moderate PHG in proximal body and fundus. Mild gastritis in antrum and distal body. Cold-forceps biopsies for pathology. Otherwise normal exam.  DUODENUM: Normal    ESTIMATED BLOOD LOSS 0-minimal     COMPLICATIONS: none    Specimens obtained during procedure:   1. Mid-esophageal biopsies  2. Gastric biopsies    PLAN:  1. Follow-up pathology  2. Repeat EGD at hospital in 3 years  3. Omeprazole 40 mg PO daily  4. Follow-up in office 10/2022 with repeat CBC, CMP, and INR just prior to the appointment  5.  Proceed with colonoscopy    Sherif Mir MD

## 2022-06-10 NOTE — PROCEDURES
COLONOSCOPY    DATE of PROCEDURE: 6/10/2022    INDICATION:  1. Screening colonoscopy  2. Personal history of colon polyps    POSTPROCEDURE DIAGNOSIS:  1. Colon polyps  2. Internal hemorrhoids    MEDICATION:   MAC. Further details per anesthesia note. INSTRUMENT: [id number]    PROCEDURE: After obtaining informed consent, the patient was placed in the left lateral position and sedated. The endoscope was advanced to the cecum where the appendiceal orifice and ileocecal valve were identified. The terminal ileum was not entered . On withdrawal, the colon was carefully visualized in a 360 degree fashion. Retroflexion was performed in the rectum. The patient was taken to the recovery area in stable condition. The quality of the bowel prep was good. FINDINGS:  Rectum: Small internal hemorrhoids. Otherwise normal exam.  Sigmoid: Single sessile polyp <5 mm in size. Cold-snare polypectomy. Otherwise normal exam.  Descending Colon: normal  Transverse Colon: Single flat polyp 8 mm in size. Cold-snare polypectomy. Otherwise normal exam.  Ascending Colon: Three sessile polyps each <5 mm in size. Cold-forceps biopsy polypectomy x3. Otherwise normal exam.  Cecum: normal  Terminal ileum: not entered    Estimated blood loss: 0-minimal     Complication: none    Specimens obtained during procedure:  1. Ascending/transverse colon polyps  2. Sigmoid colon polyp    PLAN:  1. Follow-up pathology  2.  Repeat exam based on pathology    Concepcion Ruth MD

## 2022-06-10 NOTE — ANESTHESIA PRE PROCEDURE
Department of Anesthesiology  Preprocedure Note       Name:  Mayelin Theodore   Age:  80 y.o.  :  1940                                          MRN:  [id number]         Date:  6/10/2022      Surgeon: Evelia Martinez):  Steven Lacy MD    Procedure: Procedure(s):  EGD ESOPHAGOGASTRODUODENOSCOPY  COLORECTAL CANCER SCREENING, NOT HIGH RISK    Medications prior to admission:   Prior to Admission medications    Medication Sig Start Date End Date Taking?  Authorizing Provider   calcium-vitamin D (OSCAL) 250-125 MG-UNIT per tablet Take 1 tablet by mouth daily   Yes Historical Provider, MD   Multiple Vitamin (THERAPEUTIC) TABS tablet Take by mouth daily    Historical Provider, MD   Omega-3 Fatty Acids (FISH OIL) 1000 MG CAPS Take 1 capsule by mouth daily    Historical Provider, MD   acetaminophen (TYLENOL) 500 MG tablet Take 1,000 mg by mouth 2 times daily    Ar Automatic Reconciliation   ascorbic acid (VITAMIN C) 500 MG tablet Take by mouth daily    Ar Automatic Reconciliation   Cholecalciferol 50 MCG (2000 UT) CAPS Take by mouth daily     Ar Automatic Reconciliation   empagliflozin (JARDIANCE) 25 MG tablet Take 25 mg by mouth every other day    Ar Automatic Reconciliation   gabapentin (NEURONTIN) 300 MG capsule TAKE 1 CAPSULE BY MOUTH DAILY AT BEDTIME 3/26/19   Ar Automatic Reconciliation   levothyroxine (SYNTHROID) 125 MCG tablet Take by mouth every morning (before breakfast)    Ar Automatic Reconciliation   lisinopril-hydroCHLOROthiazide (PRINZIDE;ZESTORETIC) 20-12.5 MG per tablet TAKE 1 TABLET BY MOUTH DAILY 12/3/18   Ar Automatic Reconciliation   meclizine (ANTIVERT) 25 MG tablet Take 25 mg by mouth in the morning and at bedtime  22   Ar Automatic Reconciliation   metFORMIN (GLUCOPHAGE-XR) 500 MG extended release tablet Take 1,000 mg by mouth in the morning and at bedtime TAKE 4 TABLETS BY MOUTH DAILY 3/26/19   Ar Automatic Reconciliation   pioglitazone (ACTOS) 30 MG tablet TAKE 1 TABLET BY MOUTH DAILY 10/29/18   Ar Automatic Reconciliation   pravastatin (PRAVACHOL) 40 MG tablet TAKE 1 TABLET BY MOUTH NIGHTLY FOR HYPERLIPIDEMIA 3/26/19   Ar Automatic Reconciliation   rOPINIRole (REQUIP) 0.5 MG tablet Take 0.5 mg by mouth every evening TAKE 1 TABLET BY MOUTH in the evening 3/26/19   Ar Automatic Reconciliation       Current medications:    No current facility-administered medications for this encounter. Allergies:  No Known Allergies    Problem List:    Patient Active Problem List   Diagnosis Code    Obesity (BMI 30.0-34. 9) E66.9    Blood clotting disorder (HCC) D68.9    Arteriosclerosis of carotid artery I65.29    Malaise and fatigue R53.81, R53.83    Dizziness and giddiness R42    Joint derangement M24.9    Hyperglyceridemia, pure E78.1    Muscle spasm of back M62.830    MERT on CPAP G47.33, Z99.89    Onychomycosis B35.1    Nontoxic uninodular goiter E04.1    Anemia D64.9    GERD (gastroesophageal reflux disease) K21.9    Hypercholesterolemia E78.00    Lumbar stenosis with neurogenic claudication M48.062    Peripheral neuropathy G62.9    Hematoma, subungual, great toe, left L67.274R    Diabetic neuropathy, painful ([facility]) E11.40    Pain in limb M79.609    Abnormal clinical finding R68.89    Acquired hypothyroidism E03.9    Hypersomnia G47.10    Ankle fracture, right S82.891A    Nocturia R35.1    Disorder of fluid or electrolyte E87.8    Mononeuritis of lower extremity G57.90    Hyperlipidemia due to type 2 diabetes mellitus ([facility]) E11.69, E78.5    RLS (restless legs syndrome) G25.81    Dyslipidemia E78.5    Precordial pain R07.2    Leukemia ([facility]) C95.90    BPH (benign prostatic hyperplasia) N40.0    PLMD (periodic limb movement disorder) G47.61    DM (diabetes mellitus), type 2, uncontrolled ([facility]) E11.65    Type 2 diabetes mellitus with hyperglycemia, without long-term current use of insulin ([facility]) E11.65    Polyarthritis M13.0    Arthritis M19.90    Hypertension, essential, benign I10    Hip tendinitis M76.899    Radiculopathy, lumbosacral region M54.17       Past Medical History:        Diagnosis Date    Abnormal clinical finding 10/17/2016    Allergic rhinitis     Anemia 10/17/2016    Arteriosclerosis of carotid artery 10/17/2016    Arthritis     hands    BPH (benign prostatic hyperplasia) 10/17/2016    Dermatophytosis of nail     Diabetes ([address]     avg fasting BS = 150-170; A1C 7.3 10/2021     Diabetic neuropathy, painful ([address] 10/17/2016    Disorder of fluid or electrolyte 10/17/2016    Dizziness and giddiness 10/17/2016    dizziness, ataxia 5/2021 ER visit    DM (diabetes mellitus), type 2, uncontrolled ([address] 10/17/2016    oral agents, -150, S&S of hypoglycemia BS 45    Dyslipidemia 10/17/2016    GERD (gastroesophageal reflux disease) 10/17/2016    Hematoma, subungual, great toe, left 10/17/2016    Hip tendinitis 10/17/2016    Hx of blood clots 1987/2002    DVTs to both legs     Hypercholesterolemia 10/17/2016    Hypertension     controlled with meds    Hypertension, essential, benign 10/17/2016    controlled with med    Hypomagnesemia     Joint derangement 10/17/2016    Lumbar stenosis with neurogenic claudication 10/17/2016    Malaise and fatigue 10/17/2016    Mononeuritis of lower extremity 10/17/2016    Muscle spasm of back 10/17/2016    Neuropathy     Nontoxic uninodular goiter 10/17/2016    PAD (peripheral artery disease) ([address] 10/17/2016    Pain in limb 10/17/2016    Pain, foot     Peripheral neuropathy 10/17/2016    PLMD (periodic limb movement disorder) 10/17/2016    Polyarthritis 10/17/2016    Radiculopathy, lumbosacral region 10/17/2016    RLS (restless legs syndrome) 10/17/2016    Skin cancer     face, head    Thromboembolus ([address]     blood clot in both legs 1987 ,2002    Thyroid disease     hypo    Unspecified sleep apnea     wears cpap \" most \" of the time    Weakness        Past Surgical History:        Procedure Laterality Date    APPENDECTOMY      CHOLECYSTECTOMY      COLONOSCOPY  11/29/2017    COLONOSCOPY N/A 11/29/2017    COLONOSCOPY / BMI=27 performed by Gab Carrington MD at [address] [place] [address]      right ear surgery-to improve hearing    OTHER SURGICAL HISTORY      colonscopy     SKIN BIOPSY      TOTAL KNEE ARTHROPLASTY  2002    right and left       Social History:    Social History     Tobacco Use    Smoking status: Never Smoker    Smokeless tobacco: Never Used   Substance Use Topics    Alcohol use: No                                Counseling given: Not Answered      Vital Signs (Current):   Vitals:    06/09/22 0934   Weight: 207 lb (93.9 kg)   Height: 5' 11\" (1.803 m)                                              BP Readings from Last 3 Encounters:   03/02/21 122/60       NPO Status: Time of last liquid consumption: 0730                        Time of last solid consumption: 2100                        Date of last liquid consumption: 06/10/22                        Date of last solid food consumption: 06/08/22    BMI:   Wt Readings from Last 3 Encounters:   06/09/22 207 lb (93.9 kg)   04/06/22 220 lb (99.8 kg)   10/25/21 220 lb (99.8 kg)     Body mass index is 28.87 kg/m².     CBC:   Lab Results   Component Value Date    WBC 5.4 10/30/2021    RBC 3.78 10/30/2021    HGB 12.8 10/30/2021    HCT 37.7 10/30/2021    MCV 99.7 10/30/2021    RDW 13.0 10/30/2021     10/30/2021       CMP:   Lab Results   Component Value Date     10/30/2021    K 4.2 10/30/2021     10/30/2021    CO2 26 10/30/2021    BUN 28 10/30/2021    CREATININE 0.99 03/29/2022    CREATININE 1.20 10/30/2021    GFRAA >60 03/29/2022    AGRATIO 0.8 10/30/2021    LABGLOM >60 03/29/2022    GLUCOSE 306 10/30/2021    PROT 6.3 10/30/2021    CALCIUM 8.8 10/30/2021    BILITOT 1.6 10/30/2021    ALKPHOS 129 10/30/2021    AST 56 10/30/2021    ALT 62 10/30/2021       POC Tests: No results for input(s): POCGLU, POCNA, DUSTIN, POCHALLIE, POCJAIME, Leopoldo Aurora in the last 72 hours. Coags: No results found for: PROTIME, INR, APTT    HCG (If Applicable): No results found for: PREGTESTUR, PREGSERUM, HCG, HCGQUANT     ABGs: No results found for: PHART, PO2ART, OXZ1ECO, YLI1QSD, BEART, Q3UYOHNU     Type & Screen (If Applicable):  No results found for: LABABO, LABRH    Drug/Infectious Status (If Applicable):  No results found for: HIV, HEPCAB    COVID-19 Screening (If Applicable):   Lab Results   Component Value Date    COVID19 Detected 01/13/2022    COVID19 Performed 01/13/2022           Anesthesia Evaluation  Patient summary reviewed and Nursing notes reviewed no history of anesthetic complications:   Airway: Mallampati: II  TM distance: >3 FB   Neck ROM: full  Mouth opening: > = 3 FB   Dental:    (+) lower dentures and upper dentures      Pulmonary: breath sounds clear to auscultation  (+) shortness of breath:  sleep apnea:                             Cardiovascular:  Exercise tolerance: no interval change, Activity limited by BECKETT and arthritis. Denied chest pains   (+) hypertension:, hyperlipidemia      ECG reviewed  Rhythm: regular  Rate: normal    Stress test reviewed             ROS comment: Normal nuclear stress 2018      Neuro/Psych:                ROS comment: Peripheral neuropathy  GI/Hepatic/Renal:   (+) GERD: well controlled,          ROS comment: Dysphagia. Endo/Other:    (+) DiabetesType II DM, , hypothyroidism: arthritis:., .                 Abdominal:             Vascular: negative vascular ROS. Other Findings:           Anesthesia Plan      TIVA     ASA 3       Induction: intravenous. Anesthetic plan and risks discussed with patient.                         Brando Mosqueda MD   6/10/2022

## 2022-06-10 NOTE — H&P
PreProcedure H&P Update    Today's Date:  6/10/2022    CC:  Pill dysphagia, liver cirrhosis, h/o colon polyps, colon cancer screening    Subjective:   HPI:n Patient is a 79 yo male with h/o liver cirrhosis here for EGD/colonosocpy for above complaints.     PMH:  Past Medical History:   Diagnosis Date    Abnormal clinical finding 10/17/2016    Allergic rhinitis     Anemia 10/17/2016    Arteriosclerosis of carotid artery 10/17/2016    Arthritis     hands    BPH (benign prostatic hyperplasia) 10/17/2016    Dermatophytosis of nail     Diabetes ([address]     avg fasting BS = 150-170; A1C 7.3 10/2021     Diabetic neuropathy, painful ([address] 10/17/2016    Disorder of fluid or electrolyte 10/17/2016    Dizziness and giddiness 10/17/2016    dizziness, ataxia 5/2021 ER visit    DM (diabetes mellitus), type 2, uncontrolled ([address] 10/17/2016    oral agents, -150, S&S of hypoglycemia BS 45    Dyslipidemia 10/17/2016    GERD (gastroesophageal reflux disease) 10/17/2016    Hematoma, subungual, great toe, left 10/17/2016    Hip tendinitis 10/17/2016    Hx of blood clots 1987/2002    DVTs to both legs     Hypercholesterolemia 10/17/2016    Hypertension     controlled with meds    Hypertension, essential, benign 10/17/2016    controlled with med    Hypomagnesemia     Joint derangement 10/17/2016    Lumbar stenosis with neurogenic claudication 10/17/2016    Malaise and fatigue 10/17/2016    Mononeuritis of lower extremity 10/17/2016    Muscle spasm of back 10/17/2016    Neuropathy     Nontoxic uninodular goiter 10/17/2016    PAD (peripheral artery disease) ([address] 10/17/2016    Pain in limb 10/17/2016    Pain, foot     Peripheral neuropathy 10/17/2016    PLMD (periodic limb movement disorder) 10/17/2016    Polyarthritis 10/17/2016    Radiculopathy, lumbosacral region 10/17/2016    RLS (restless legs syndrome) 10/17/2016    Skin cancer     face, head    Thromboembolus ([address]     blood clot in both legs 1987 ,2002    Thyroid disease     hypo    Unspecified sleep apnea     wears cpap \" most \" of the time    Weakness        Medications:   Current Facility-Administered Medications   Medication Dose Route Frequency    lidocaine PF 1 % injection 1 mL  1 mL IntraDERmal Once PRN    lactated ringers infusion   IntraVENous Continuous    sodium chloride flush 0.9 % injection 5-40 mL  5-40 mL IntraVENous 2 times per day    sodium chloride flush 0.9 % injection 5-40 mL  5-40 mL IntraVENous PRN    0.9 % sodium chloride infusion   IntraVENous PRN         Objective:   Vitals:  BP (!) 122/57   Pulse 56   Temp 97.6 °F (36.4 °C) (Oral)   Resp 20   Ht 5' 11\" (1.803 m)   Wt 207 lb (93.9 kg)   SpO2 99%   BMI 28.87 kg/m²   Exam:  General appearance: alert, cooperative, no distress  Lungs: clear to auscultation bilaterally anteriorly  Heart: regular rate and rhythm  Abdomen: soft, non-tender. Bowel sounds normal. No masses, no organomegaly      Data Review (Labs):    No results for input(s): WBC, HGB, HCT, PLT, MCV, NA, K, CL, CO2, BUN, CREA, CA, GLU, ALB, TP, AML, INR, APTT in the last 72 hours.     Invalid input(s): AP, SGOT, GPT, TBIL, DBIL, CBIL, LPSE, PTP    Plan:     EGD with possible dilation/colonoscopy

## 2022-06-22 ENCOUNTER — APPOINTMENT (RX ONLY)
Dept: URBAN - METROPOLITAN AREA CLINIC 329 | Facility: CLINIC | Age: 82
Setting detail: DERMATOLOGY
End: 2022-06-22

## 2022-06-22 DIAGNOSIS — L57.8 OTHER SKIN CHANGES DUE TO CHRONIC EXPOSURE TO NONIONIZING RADIATION: ICD-10-CM

## 2022-06-22 DIAGNOSIS — L57.0 ACTINIC KERATOSIS: ICD-10-CM

## 2022-06-22 DIAGNOSIS — Z85.828 PERSONAL HISTORY OF OTHER MALIGNANT NEOPLASM OF SKIN: ICD-10-CM

## 2022-06-22 PROCEDURE — ? SUNSCREEN RECOMMENDATIONS

## 2022-06-22 PROCEDURE — ? FULL BODY SKIN EXAM - DECLINED

## 2022-06-22 PROCEDURE — ? COUNSELING

## 2022-06-22 PROCEDURE — 17004 DESTROY PREMAL LESIONS 15/>: CPT

## 2022-06-22 PROCEDURE — ? LIQUID NITROGEN

## 2022-06-22 PROCEDURE — ? ADDITIONAL NOTES

## 2022-06-22 PROCEDURE — 99213 OFFICE O/P EST LOW 20 MIN: CPT | Mod: 25

## 2022-06-22 ASSESSMENT — LOCATION SIMPLE DESCRIPTION DERM
LOCATION SIMPLE: LEFT EAR
LOCATION SIMPLE: RIGHT FOREHEAD
LOCATION SIMPLE: LEFT CHEEK
LOCATION SIMPLE: RIGHT EAR
LOCATION SIMPLE: RIGHT CHEEK
LOCATION SIMPLE: LEFT CHEEK
LOCATION SIMPLE: LEFT SCALP
LOCATION SIMPLE: LEFT FOREHEAD
LOCATION SIMPLE: LEFT EAR
LOCATION SIMPLE: NOSE
LOCATION SIMPLE: RIGHT CHEEK
LOCATION SIMPLE: LEFT FOREHEAD
LOCATION SIMPLE: RIGHT SCALP

## 2022-06-22 ASSESSMENT — LOCATION DETAILED DESCRIPTION DERM
LOCATION DETAILED: LEFT SCAPHA
LOCATION DETAILED: LEFT FOREHEAD
LOCATION DETAILED: LEFT MEDIAL FOREHEAD
LOCATION DETAILED: LEFT CENTRAL FRONTAL SCALP
LOCATION DETAILED: NASAL DORSUM
LOCATION DETAILED: LEFT SCAPHA
LOCATION DETAILED: RIGHT CENTRAL FRONTAL SCALP
LOCATION DETAILED: RIGHT SUPERIOR HELIX
LOCATION DETAILED: LEFT CENTRAL MALAR CHEEK
LOCATION DETAILED: RIGHT SUPERIOR FOREHEAD
LOCATION DETAILED: RIGHT CENTRAL MALAR CHEEK
LOCATION DETAILED: RIGHT ANTITRAGUS
LOCATION DETAILED: LEFT MEDIAL MALAR CHEEK
LOCATION DETAILED: LEFT LATERAL FOREHEAD
LOCATION DETAILED: LEFT SUPERIOR FOREHEAD
LOCATION DETAILED: LEFT SUPERIOR FOREHEAD
LOCATION DETAILED: LEFT FOREHEAD
LOCATION DETAILED: LEFT LATERAL FOREHEAD
LOCATION DETAILED: NASAL TIP
LOCATION DETAILED: RIGHT SUPERIOR MEDIAL BUCCAL CHEEK
LOCATION DETAILED: LEFT SUPERIOR CRUS OF ANTIHELIX
LOCATION DETAILED: RIGHT FOREHEAD
LOCATION DETAILED: RIGHT MEDIAL MALAR CHEEK
LOCATION DETAILED: RIGHT CENTRAL MALAR CHEEK
LOCATION DETAILED: LEFT MEDIAL FOREHEAD

## 2022-06-22 ASSESSMENT — LOCATION ZONE DERM
LOCATION ZONE: FACE
LOCATION ZONE: NOSE
LOCATION ZONE: SCALP
LOCATION ZONE: EAR
LOCATION ZONE: EAR
LOCATION ZONE: FACE

## 2022-07-01 ENCOUNTER — HOSPITAL ENCOUNTER (OUTPATIENT)
Dept: NUTRITION | Age: 82
Discharge: HOME | End: 2022-07-04
Payer: MEDICARE

## 2022-07-01 PROCEDURE — 97803 MED NUTRITION INDIV SUBSEQ: CPT

## 2022-07-01 NOTE — PROGRESS NOTES
Anita Reece MS,  RD, LD  Outpatient Registered Dietitian  [facility]  Phone: [phone]  Fax: [phone]     ASSESSMENT  Pt is a 80 y.o. male referred by gastroenterology with the following diagnosis (es):   Obesity (BMI 30-39. 9) E66.9  Cirrhosis of liver without ascites (K74.60)  DM: suboptimal glycemic control, A1C= 7.3. Monitors BG using FSBS intermittently. Did not check BG this AM, checked a few days ago, stated home fasting was 160mg/dL. Medications managed by PCP current regimen inludes: Jardiance, Metformin XR, and Actos. States he takes his medications as prescribed. Follow Up assessment:  Comes in today accompanied by his spouse, Fab Arzola. Voices he is eating 3 meals per day, using the [employer] food milli to find healthier choices, has tried the [employer], and likes it. Increasing fiber in his diet from baseline. Checking his fasting BG, on most days, ranges from 150-160, this morning 184mg/dL. Last night's  meal: 4 cups of beans. Weight: Starting weight: 207 pounds, in office wieght 203 pounds. 2% weight loss. Progressing weight loss goals    Unbalanced intake of nutrients related to nutrition knowledge evidenced by diet recall. Intervention: Discussed carbohydrates meal goals, reviewed portions and revisited concepts of my plate. Encouraged tracking 2 hrPPBG to have a better understanding of glycemic response to meals. Continue outlined goals below. Follow up: 6 weeks, with tracked meals and blood glucose  Anita Reece MS RD LD     Initial Assessment:   Patient stated nutrition goal: Weight loss, goal weight 180 pounds, current in office weight is 207 pounds.   Motivation- grandchild's wedding in November.         Eating behaviors and factors impacting food choices:   · established food preferences/eating behaviors  · dining out more than 50% of meals-   · family food preferences: voices spouse and daughter influence foods consumed in the home. \"wife likes pillsbury biscuits\" - consumes 3 for breakfast.   · diet culture: \"bariatric diet\", followed \"all\" except for weight watchers     Initial Diet Recall  (written)  Breakfast: 1 egg + egg whites, coffee with cream  Lunch: [employer] spicy chicken sandwich with french fries  Snack: Yevgeniy cheese crackers + banana  Dinner: pork chop, cottage cheese, pineapple, hotsauce/ ketchup, water  Beverages: water, diet drinks     Baseline eating pattern appears inadequate in carbohydrate, fiber and vegetables and excessive in saturated fat, sodium and refined carbohydrate      Lifestyle/Family Influence/Support: s/p ORIF right ankle after fall. Working with physical therapy, unstable gait observed. C/o SOB when he walks. Would like to increase his PA. 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 Spouse s/p bariatric surgery.     Stage of Change: Preparation.         Patient Active Problem List   Diagnosis    Obesity (BMI 30.0-34. 9)    Blood clotting disorder (HCC)    Arteriosclerosis of carotid artery    Malaise and fatigue    Dizziness and giddiness    Joint derangement    Hyperglyceridemia, pure    Muscle spasm of back    MERT on CPAP    Onychomycosis    Nontoxic uninodular goiter    Anemia    GERD (gastroesophageal reflux disease)    Hypercholesterolemia    Lumbar stenosis with neurogenic claudication    Peripheral neuropathy    Hematoma, subungual, great toe, left    Diabetic neuropathy, painful (HCC)    Pain in limb    Abnormal clinical finding    Acquired hypothyroidism    Hypersomnia    Ankle fracture, right    Nocturia    Disorder of fluid or electrolyte    Mononeuritis of lower extremity    Hyperlipidemia due to type 2 diabetes mellitus (HCC)    RLS (restless legs syndrome)    Dyslipidemia    Precordial pain    Leukemia (HCC)    BPH (benign prostatic hyperplasia)    PLMD (periodic limb movement disorder)    DM (diabetes mellitus), type 2, uncontrolled [facility]    Type 2 diabetes mellitus with hyperglycemia, without long-term current use of insulin ([facility])    Polyarthritis    Arthritis    Hypertension, essential, benign    Hip tendinitis    Radiculopathy, lumbosacral region         Anthropometrics:   Estimated body mass index is 30.68 kg/m² as calculated from the following:    Height as of 10/26/21: 5' 11\" (1.803 m). Weight as of 4/6/22: 220 lb (99.8 kg).    Estimated Nutrition Needs:  MSJ x 1.2= 2000- 200= 1800 kcal/day for weight reduction      Protein: 80-100g/day (20%) Carbohydrates: 180-200g per day.       DIAGNOSIS:  Unbalanced diet related to nutrition knowledge deficit as evidenced by eating pattern as above.      INTERVENTION:  Goal: 1-2# weight loss/week     Recommendations:  -25g+ fiber/day- swap from biscuits to higher fiber bread/oatmeal for breakfast.  - 55-60 grams of total carbohydrates per meal  -Increase phytonutrient intake with variety of color at meals  Aim to include 2 servings of fruits and 3 cups of non starchy vegetables per day  -Less than 25g added sugar/day- try true lemon/lime in water. , and reduce sweetened beverages.   -Track meals for 3 days and record blood sugars on tracking too       Nutrition Education and Counseling (60minutes):  Discussed nutrition principles for weight management and focus on food quality/nutrition density of food choices. Reviewed recall and provided recommendations for change, provided strategies for increasing protein and fiber at meals. Provided goals for added sugar/day and protein/meal. Reviewed Diabetes My Plate as template for meal planning. Answered questions. Affirmed changes made thus far to lifestyle. Reviewed ADA blood glucose/ A1C targets. Addressed cognitive distortions regarding weight and nutrition. Utilized the following behavior change strategies: MI, goal setting.                Materials Provided and Discussed:  Fiber, meal planning, protein/carbohydrate snacks      Patient verbalized understanding of recommendations discussed.     MONITORING & EVALUATION:  Monitor Food and Nutrient Intake and Anthropometrics  Follow Up: 3 weeks with tracked meals

## 2022-08-19 ENCOUNTER — HOSPITAL ENCOUNTER (OUTPATIENT)
Dept: NUTRITION | Age: 82
Discharge: HOME | End: 2022-08-22
Payer: MEDICARE

## 2022-08-19 PROCEDURE — 97803 MED NUTRITION INDIV SUBSEQ: CPT

## 2022-08-19 NOTE — PROGRESS NOTES
Alvina Rob, MS,  RD, LD  Outpatient Registered Dietitian  [facility]  Phone: [phone]  Fax: [phone]     ASSESSMENT  Pt is a 80 y.o. male referred by gastroenterology with the following diagnosis (es):   Obesity (BMI 30-39. 9) E66.9  Cirrhosis of liver without ascites (K74.60)  DM: suboptimal glycemic control, A1C= 7.3. Monitors BG using FSBS intermittently. Did not check BG this AM, checked a few days ago, stated home fasting was 160mg/dL. Medications managed by PCP current regimen inludes: Jardiance, Metformin XR, and Actos. States he takes his medications as prescribed. 8/19/2022: Follow up assessment. Comes in today progressing towards nutrition goals for weight reduction. Notes clothes are fitting looser, notable decrease in WC, purchasing a smaller pant 36 pant size. Continues to have swelling in his right ankle which he has fractured and underwent a internal fixation in 2021. Ambulates independently, gait is slow. He has not been checking his blood glucose as instructed. Diet recall:   He is using my fitness pal to track nutritionals. Self imputed data reviewed from his smart phone today. Last week consumed on average 25 grams fiber, 200g CHO, 75 grams protein, and ~3700 mg sodium per day. Diet variety improving, increasing intake of fiber from whole grains, fruits and canned vegetables. Diet excess in carbohydrates and sodium. Barriers expressed today: Increase in food costs- [employer] bread increased to $6 per loaf. Estimated Nutrition Needs:  MSJ x 1.2= 2000- 200= 1800 kcal/day for weight reduction      Protein: 80-100g/day (20%) Carbohydrates: 180-200g per day. Weight: Starting weight: 207 pounds, in office wieght 199 pounds. 4% weight loss. Progressing weight loss goals    Voices his family wedding is in Nov, and he would like to be down to 199.   We discussed feeling healthy, improvements in biomarkers, and reduction in weight 180 pounds, current in office weight is 207 pounds. Motivation- grandchild's wedding in November. Eating behaviors and factors impacting food choices:   established food preferences/eating behaviors  dining out more than 50% of meals-   family food preferences: voices spouse and daughter influence foods consumed in the home. \"wife likes pillsbury biscuits\" - consumes 3 for breakfast.  diet culture: \"bariatric diet\", followed \"all\" except for weight watchers     Initial Diet Recall  (written)  Breakfast: 1 egg + egg whites, coffee with cream  Lunch: [employer] spicy chicken sandwich with french fries  Snack: Yevgeniy cheese crackers + banana  Dinner: pork chop, cottage cheese, pineapple, hotsauce/ ketchup, water  Beverages: water, diet drinks     Baseline eating pattern appears inadequate in carbohydrate, fiber and vegetables and excessive in saturated fat, sodium and refined carbohydrate      Lifestyle/Family Influence/Support: s/p ORIF right ankle after fall. Working with physical therapy, unstable gait observed. C/o SOB when he walks. Would like to increase his PA. 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 Spouse s/p bariatric surgery. Stage of Change: Preparation. Patient Active Problem List   Diagnosis    Obesity (BMI 30.0-34. 9)    Blood clotting disorder (HCC)    Arteriosclerosis of carotid artery    Malaise and fatigue    Dizziness and giddiness    Joint derangement    Hyperglyceridemia, pure    Muscle spasm of back    MERT on CPAP    Onychomycosis    Nontoxic uninodular goiter    Anemia    GERD (gastroesophageal reflux disease)    Hypercholesterolemia    Lumbar stenosis with neurogenic claudication    Peripheral neuropathy    Hematoma, subungual, great toe, left    Diabetic neuropathy, painful (HCC)    Pain in limb    Abnormal clinical finding    Acquired hypothyroidism    Hypersomnia    Ankle fracture, right    Nocturia    Disorder of fluid or electrolyte Reviewed ADA blood glucose/ A1C targets. Addressed cognitive distortions regarding weight and nutrition. Utilized the following behavior change strategies: MI, goal setting. Materials Provided and Discussed:  Fiber, meal planning, protein/carbohydrate snacks      Patient verbalized understanding of recommendations discussed.      MONITORING & EVALUATION:  Monitor Food and Nutrient Intake and Anthropometrics  Follow Up: 3 weeks with tracked meals

## 2022-08-24 RX ORDER — MECLIZINE HYDROCHLORIDE 25 MG/1
25 TABLET ORAL 3 TIMES DAILY PRN
Qty: 90 TABLET | Refills: 11 | Status: SHIPPED | OUTPATIENT
Start: 2022-08-24

## 2022-09-06 ENCOUNTER — TELEPHONE (OUTPATIENT)
Dept: ORTHOPEDIC SURGERY | Age: 82
End: 2022-09-06

## 2022-09-21 ENCOUNTER — OFFICE VISIT (OUTPATIENT)
Dept: ORTHOPEDIC SURGERY | Age: 82
End: 2022-09-21
Payer: MEDICARE

## 2022-09-21 DIAGNOSIS — S82.851A DISPLACED TRIMALLEOLAR FRACTURE OF RIGHT LOWER LEG, INITIAL ENCOUNTER FOR CLOSED FRACTURE: Primary | ICD-10-CM

## 2022-09-21 PROCEDURE — 99212 OFFICE O/P EST SF 10 MIN: CPT | Performed by: NURSE PRACTITIONER

## 2022-09-21 PROCEDURE — 1123F ACP DISCUSS/DSCN MKR DOCD: CPT | Performed by: NURSE PRACTITIONER

## 2022-09-21 NOTE — PROGRESS NOTES
Name: Mariangel June  YOB: 1940  Gender: male  MRN: [id number]    Procedure Performed: Right Ankle Open Reduction Internal Fixation With Poss Syndesmotic Screw Choice Anes - Right             Date of Procedure: 10/26/2021       Subjective: Patient feels overall the pain in his ankle is better however he feels like his gait has been affected as the fracture is healed. He is still concerned with the swelling that he notices daily too. Physical Examination: Incisions are well-healed no signs of infection. He has palpable pulses and intact sensation to the foot. He has mild swelling to both medial and lateral sides of the ankle. The ankle joint itself is stable as evidence with talar tilt and anterior drawer testing. Range of motion to the ankle joint was performed with stiffness however no pain experienced by the patient. Imaging:   I independently interpreted XR taken today  Right ankle XR: AP, Lateral, Oblique views     ICD-10-CM    1. Displaced trimalleolar fracture of right lower leg, initial encounter for closed fracture  S82.851A XR ANKLE RIGHT (MIN 3 VIEWS)         Report: AP, lateral, oblique x-ray of the right ankle demonstrates healed fractures with no hardware failure    Impression: Healed fractures with no hardware failure   Ann Griffin, GOMEZ - CNP           Assessment:   Status post right ankle ORIF with syndesmotic screw placement. Plan:   3 This is stable chronic illness/condition  Treatment at this time:  Patient will continue to elevate the affected extremity as needed for swelling, he will continue home ankle program to help with strength and mobility as he does not want to go to resume physical therapy due to cost at this time. He is interested in following up with a total joint surgeon regarding his 21year-old knee prosthesis and its potential for affecting his gait and overall leg alignment.   The patient will follow up on a as needed basis.   Studies ordered: NO XR needed @ Next Visit    Weight-bearing status: WBAT        Return to work/work restrictions: none  No medications given

## 2022-11-25 ENCOUNTER — HOSPITAL ENCOUNTER (OUTPATIENT)
Dept: GENERAL RADIOLOGY | Age: 82
Discharge: HOME | End: 2022-11-28
Payer: MEDICARE

## 2022-11-25 DIAGNOSIS — R13.10 DYSPHAGIA, UNSPECIFIED TYPE: ICD-10-CM

## 2022-11-25 PROCEDURE — A4641 RADIOPHARM DX AGENT NOC: HCPCS | Performed by: INTERNAL MEDICINE

## 2022-11-25 PROCEDURE — 74220 X-RAY XM ESOPHAGUS 1CNTRST: CPT

## 2022-11-25 PROCEDURE — 6360000004 HC RX CONTRAST MEDICATION: Performed by: INTERNAL MEDICINE

## 2022-11-25 PROCEDURE — 6370000000 HC RX 637 (ALT 250 FOR IP): Performed by: INTERNAL MEDICINE

## 2022-11-25 PROCEDURE — 2500000003 HC RX 250 WO HCPCS: Performed by: INTERNAL MEDICINE

## 2022-11-25 RX ADMIN — ANTACID/ANTIFLATULENT 1 EACH: 380; 550; 10; 10 GRANULE, EFFERVESCENT ORAL at 09:38

## 2022-11-25 RX ADMIN — BARIUM SULFATE 140 ML: 980 POWDER, FOR SUSPENSION ORAL at 09:38

## 2022-11-25 RX ADMIN — BARIUM SULFATE 1 TABLET: 700 TABLET ORAL at 09:38

## 2022-11-25 RX ADMIN — BARIUM SULFATE 355 ML: 0.6 SUSPENSION ORAL at 09:38

## 2022-12-22 DIAGNOSIS — U07.1 COVID-19: Primary | ICD-10-CM

## 2022-12-22 RX ORDER — NIRMATRELVIR AND RITONAVIR 300-100 MG
KIT ORAL
Qty: 30 TABLET | Refills: 0 | Status: SHIPPED | OUTPATIENT
Start: 2022-12-22 | End: 2022-12-27

## 2022-12-22 RX ORDER — DEXTROMETHORPHAN HYDROBROMIDE AND PROMETHAZINE HYDROCHLORIDE 15; 6.25 MG/5ML; MG/5ML
5 SYRUP ORAL EVERY 4 HOURS PRN
Qty: 180 ML | Refills: 0 | Status: SHIPPED | OUTPATIENT
Start: 2022-12-22

## 2022-12-22 RX ORDER — ALBUTEROL SULFATE 90 UG/1
2 AEROSOL, METERED RESPIRATORY (INHALATION) EVERY 6 HOURS PRN
Qty: 1 EACH | Refills: 11 | Status: SHIPPED | OUTPATIENT
Start: 2022-12-22

## 2023-01-24 ENCOUNTER — HOSPITAL ENCOUNTER (EMERGENCY)
Age: 83
Discharge: HOME | End: 2023-01-24
Attending: EMERGENCY MEDICINE
Payer: MEDICARE

## 2023-01-24 ENCOUNTER — APPOINTMENT (OUTPATIENT)
Dept: GENERAL RADIOLOGY | Age: 83
End: 2023-01-24
Payer: MEDICARE

## 2023-01-24 VITALS
HEIGHT: 71 IN | HEART RATE: 75 BPM | BODY MASS INDEX: 28.98 KG/M2 | OXYGEN SATURATION: 100 % | TEMPERATURE: 98.2 F | RESPIRATION RATE: 20 BRPM | WEIGHT: 207 LBS | SYSTOLIC BLOOD PRESSURE: 121 MMHG | DIASTOLIC BLOOD PRESSURE: 65 MMHG

## 2023-01-24 DIAGNOSIS — R07.9 CHEST PAIN, UNSPECIFIED TYPE: Primary | ICD-10-CM

## 2023-01-24 LAB
ALBUMIN SERPL-MCNC: 3.9 G/DL (ref 3.2–4.6)
ALBUMIN/GLOB SERPL: 1.3 (ref 0.4–1.6)
ALP SERPL-CCNC: 128 U/L (ref 50–136)
ALT SERPL-CCNC: 67 U/L (ref 12–65)
ANION GAP SERPL CALC-SCNC: 10 MMOL/L (ref 2–11)
AST SERPL-CCNC: 55 U/L (ref 15–37)
BILIRUB SERPL-MCNC: 1.6 MG/DL (ref 0.2–1.1)
BUN SERPL-MCNC: 18 MG/DL (ref 8–23)
CALCIUM SERPL-MCNC: 9.7 MG/DL (ref 8.3–10.4)
CHLORIDE SERPL-SCNC: 104 MMOL/L (ref 101–110)
CO2 SERPL-SCNC: 25 MMOL/L (ref 21–32)
CREAT SERPL-MCNC: 1.2 MG/DL (ref 0.8–1.5)
EKG ATRIAL RATE: 77 BPM
EKG DIAGNOSIS: NORMAL
EKG P AXIS: 68 DEGREES
EKG P-R INTERVAL: 138 MS
EKG Q-T INTERVAL: 382 MS
EKG QRS DURATION: 94 MS
EKG QTC CALCULATION (BAZETT): 432 MS
EKG R AXIS: 74 DEGREES
EKG T AXIS: 55 DEGREES
EKG VENTRICULAR RATE: 77 BPM
ERYTHROCYTE [DISTWIDTH] IN BLOOD BY AUTOMATED COUNT: 13.4 % (ref 11.9–14.6)
GLOBULIN SER CALC-MCNC: 3.1 G/DL (ref 2.8–4.5)
GLUCOSE SERPL-MCNC: 212 MG/DL (ref 65–100)
HCT VFR BLD AUTO: 46.3 % (ref 41.1–50.3)
HGB BLD-MCNC: 16 G/DL (ref 13.6–17.2)
LIPASE SERPL-CCNC: 95 U/L (ref 73–393)
MCH RBC QN AUTO: 33.5 PG (ref 26.1–32.9)
MCHC RBC AUTO-ENTMCNC: 34.6 G/DL (ref 31.4–35)
MCV RBC AUTO: 96.9 FL (ref 82–102)
NRBC # BLD: 0 K/UL (ref 0–0.2)
PLATELET # BLD AUTO: 78 K/UL (ref 150–450)
PMV BLD AUTO: 10.8 FL (ref 9.4–12.3)
POTASSIUM SERPL-SCNC: 4 MMOL/L (ref 3.5–5.1)
PROT SERPL-MCNC: 7 G/DL (ref 6.3–8.2)
RBC # BLD AUTO: 4.78 M/UL (ref 4.23–5.6)
SODIUM SERPL-SCNC: 139 MMOL/L (ref 133–143)
TROPONIN I SERPL HS-MCNC: 3.7 PG/ML (ref 0–14)
TROPONIN I SERPL HS-MCNC: 3.9 PG/ML (ref 0–14)
WBC # BLD AUTO: 8.5 K/UL (ref 4.3–11.1)

## 2023-01-24 PROCEDURE — 99285 EMERGENCY DEPT VISIT HI MDM: CPT

## 2023-01-24 PROCEDURE — 85027 COMPLETE CBC AUTOMATED: CPT

## 2023-01-24 PROCEDURE — 71046 X-RAY EXAM CHEST 2 VIEWS: CPT

## 2023-01-24 PROCEDURE — 83690 ASSAY OF LIPASE: CPT

## 2023-01-24 PROCEDURE — 93005 ELECTROCARDIOGRAM TRACING: CPT | Performed by: NURSE PRACTITIONER

## 2023-01-24 PROCEDURE — 84484 ASSAY OF TROPONIN QUANT: CPT

## 2023-01-24 PROCEDURE — 6370000000 HC RX 637 (ALT 250 FOR IP): Performed by: NURSE PRACTITIONER

## 2023-01-24 PROCEDURE — 80053 COMPREHEN METABOLIC PANEL: CPT

## 2023-01-24 RX ORDER — LIDOCAINE HYDROCHLORIDE 20 MG/ML
15 SOLUTION OROPHARYNGEAL
Status: COMPLETED | OUTPATIENT
Start: 2023-01-24 | End: 2023-01-24

## 2023-01-24 RX ORDER — MAGNESIUM HYDROXIDE/ALUMINUM HYDROXICE/SIMETHICONE 120; 1200; 1200 MG/30ML; MG/30ML; MG/30ML
30 SUSPENSION ORAL
Status: COMPLETED | OUTPATIENT
Start: 2023-01-24 | End: 2023-01-24

## 2023-01-24 RX ADMIN — LIDOCAINE HYDROCHLORIDE 15 ML: 20 SOLUTION ORAL at 17:09

## 2023-01-24 RX ADMIN — ALUMINUM HYDROXIDE, MAGNESIUM HYDROXIDE, AND SIMETHICONE 30 ML: 200; 200; 20 SUSPENSION ORAL at 17:09

## 2023-01-24 ASSESSMENT — ENCOUNTER SYMPTOMS
VOMITING: 0
BACK PAIN: 0
ORTHOPNEA: 0
SHORTNESS OF BREATH: 0
TROUBLE SWALLOWING: 0
COUGH: 0
ABDOMINAL PAIN: 1

## 2023-01-24 ASSESSMENT — PAIN - FUNCTIONAL ASSESSMENT
PAIN_FUNCTIONAL_ASSESSMENT: NONE - DENIES PAIN
PAIN_FUNCTIONAL_ASSESSMENT: 0-10

## 2023-01-24 ASSESSMENT — PAIN SCALES - GENERAL
PAINLEVEL_OUTOF10: 1
PAINLEVEL_OUTOF10: 3
PAINLEVEL_OUTOF10: 5
PAINLEVEL_OUTOF10: 1
PAINLEVEL_OUTOF10: 1

## 2023-01-24 ASSESSMENT — PAIN DESCRIPTION - LOCATION: LOCATION: CHEST

## 2023-01-24 ASSESSMENT — HEART SCORE: ECG: 0

## 2023-01-24 ASSESSMENT — LIFESTYLE VARIABLES
HOW MANY STANDARD DRINKS CONTAINING ALCOHOL DO YOU HAVE ON A TYPICAL DAY: PATIENT DOES NOT DRINK
HOW OFTEN DO YOU HAVE A DRINK CONTAINING ALCOHOL: NEVER

## 2023-01-24 ASSESSMENT — PAIN DESCRIPTION - ORIENTATION: ORIENTATION: MID

## 2023-01-24 NOTE — ED TRIAGE NOTES
Pt brought in by ems from home c/o L arm pain when woke up w radiating to jaw then R side. Pain now epigastric and worse w deep breath intermittently.    HR 76 /88 SpO2 98% on RA   BGL 284mg/dL  A&Ox4

## 2023-01-24 NOTE — DISCHARGE INSTRUCTIONS
As we discussed, your labs and EKG were reassuring today. I am unable to provide you with a definitive cause for your pain today but it could possibly be caused from acid reflux. Please follow-up with your primary care provider as soon as possible for a recheck of your symptoms. Return to the emergency department for any new, worsening, or concerning symptoms.

## 2023-01-24 NOTE — ED PROVIDER NOTES
Emergency Department Provider Note                   PCP:                Jacinto Zavala MD               Age: [de-identified] y.o. Sex: male       ICD-10-CM    1. Chest pain, unspecified type  R07.9           DISPOSITION         Medical Decision Making  42-year-old male who presents emergency department today with complaint of chest pain. Patient appears in no acute distress. Lung sounds are clear. EKG shows normal sinus rhythm. Initial labs and troponin are normal.  Awaiting repeat troponin. Currently no evidence of ACS. Problems Addressed:  Chest pain, unspecified type: acute illness or injury    Amount and/or Complexity of Data Reviewed  Labs: ordered. Radiology: ordered. ECG/medicine tests: ordered. Risk  OTC drugs. Prescription drug management. Orders Placed This Encounter   Procedures    XR CHEST (2 VW)    CBC    Comprehensive Metabolic Panel    Troponin    Lipase    Cardiac Monitor    Pulse Oximetry    EKG 12 Lead    Saline lock IV        Medications   lidocaine viscous hcl (XYLOCAINE) 2 % solution 15 mL (15 mLs Mouth/Throat Given 1/24/23 1709)   aluminum & magnesium hydroxide-simethicone (MAALOX) 200-200-20 MG/5ML suspension 30 mL (30 mLs Oral Given 1/24/23 1709)       New Prescriptions    No medications on file        Jacinto Zavala is a [de-identified] y.o. male who presents to the Emergency Department with chief complaint of    Chief Complaint   Patient presents with    Chest Pain      42-year-old male who presents emergency department today with complaint of chest pain. He states that when he woke up this morning he felt some pain in his left arm. He used a massage gun on the arm but it did not seem helpful. He denies any pain in his arm at this time. He states then the pain moved to his abdomen. Pain was radiating up his chest and into his throat. He states that the pain is worse with taking in a deep breath. He denies any shortness of breath, vomiting, diarrhea, fever, or recent illness. He took 324 mg of aspirin but denies any other treatment. The history is provided by the patient. Chest Pain  Pain location:  Substernal area  Pain quality: burning    Pain severity:  Moderate  Onset quality:  Gradual  Duration:  1 day  Timing:  Constant  Progression:  Unchanged  Chronicity:  New  Relieved by:  None tried  Worsened by:  Nothing  Ineffective treatments:  None tried  Associated symptoms: abdominal pain    Associated symptoms: no back pain, no cough, no diaphoresis, no dizziness, no dysphagia, no fatigue, no lower extremity edema, no numbness, no orthopnea, no shortness of breath and no vomiting       Review of Systems   Constitutional:  Negative for diaphoresis and fatigue. HENT:  Negative for trouble swallowing. Respiratory:  Negative for cough and shortness of breath. Cardiovascular:  Positive for chest pain. Negative for orthopnea. Gastrointestinal:  Positive for abdominal pain. Negative for vomiting. Musculoskeletal:  Negative for back pain. Neurological:  Negative for dizziness and numbness. All other systems reviewed and are negative.     Past Medical History:   Diagnosis Date    Abnormal clinical finding 10/17/2016    Allergic rhinitis     Anemia 10/17/2016    Arteriosclerosis of carotid artery 10/17/2016    Arthritis     hands    BPH (benign prostatic hyperplasia) 10/17/2016    Dermatophytosis of nail     Diabetes (HCC)     avg fasting BS = 150-170; A1C 7.3 10/2021     Diabetic neuropathy, painful ([address] 10/17/2016    Disorder of fluid or electrolyte 10/17/2016    Dizziness and giddiness 10/17/2016    dizziness, ataxia 5/2021 ER visit    DM (diabetes mellitus), type 2, uncontrolled ([address] 10/17/2016    oral agents, -150, S&S of hypoglycemia BS 45    Dyslipidemia 10/17/2016    GERD (gastroesophageal reflux disease) 10/17/2016    Hematoma, subungual, great toe, left 10/17/2016    Hip tendinitis 10/17/2016    Hx of blood clots 1987/2002    DVTs to both legs Hypercholesterolemia 10/17/2016    Hypertension     controlled with meds    Hypertension, essential, benign 10/17/2016    controlled with med    Hypomagnesemia     Joint derangement 10/17/2016    Lumbar stenosis with neurogenic claudication 10/17/2016    Malaise and fatigue 10/17/2016    Mononeuritis of lower extremity 10/17/2016    Muscle spasm of back 10/17/2016    Neuropathy     Nontoxic uninodular goiter 10/17/2016    PAD (peripheral artery disease) ([facility] 10/17/2016    Pain in limb 10/17/2016    Pain, foot     Peripheral neuropathy 10/17/2016    PLMD (periodic limb movement disorder) 10/17/2016    Polyarthritis 10/17/2016    Radiculopathy, lumbosacral region 10/17/2016    RLS (restless legs syndrome) 10/17/2016    Skin cancer     face, head    Thromboembolus ([address]     blood clot in both legs 1987 ,2002    Thyroid disease     hypo    Unspecified sleep apnea     wears cpap \" most \" of the time    Weakness         Past Surgical History:   Procedure Laterality Date    APPENDECTOMY      CHOLECYSTECTOMY      COLONOSCOPY  11/29/2017    COLONOSCOPY N/A 11/29/2017    COLONOSCOPY / BMI=27 performed by Danette Hilario MD at [facility] ENDOSCOPY    COLONOSCOPY N/A 6/10/2022    COLONOSCOPY POLYPECTOMY SNARE/COLD BIOPSY performed by Sanjay Flores MD at [address] [place] [address]      right ear surgery-to improve hearing    OTHER SURGICAL HISTORY      colonscopy     SKIN BIOPSY      TOTAL KNEE ARTHROPLASTY  2002    right and left    UPPER GASTROINTESTINAL ENDOSCOPY N/A 6/10/2022    EGD BIOPSY with dilation performed by Sanjay Flores MD at [facility] ENDOSCOPY        Family History   Problem Relation Age of Onset    Diabetes Mother     Cancer Father         Social History     Socioeconomic History    Marital status:    Tobacco Use    Smoking status: Never    Smokeless tobacco: Never   Vaping Use    Vaping Use: Never used   Substance and Sexual Activity    Alcohol use: No    Drug use: No        Allergies: Patient has no known allergies. Previous Medications    ACETAMINOPHEN (TYLENOL) 500 MG TABLET    Take 1,000 mg by mouth 2 times daily    ALBUTEROL SULFATE HFA (PROVENTIL HFA) 108 (90 BASE) MCG/ACT INHALER    Inhale 2 puffs into the lungs every 6 hours as needed for Wheezing or Shortness of Breath    ASCORBIC ACID (VITAMIN C) 500 MG TABLET    Take by mouth daily    CALCIUM-VITAMIN D (OSCAL) 250-125 MG-UNIT PER TABLET    Take 1 tablet by mouth daily    CHOLECALCIFEROL 50 MCG (2000 UT) CAPS    Take by mouth daily     EMPAGLIFLOZIN (JARDIANCE) 25 MG TABLET    Take 25 mg by mouth every other day    GABAPENTIN (NEURONTIN) 300 MG CAPSULE    TAKE 1 CAPSULE BY MOUTH DAILY AT BEDTIME    LEVOTHYROXINE (SYNTHROID) 125 MCG TABLET    Take by mouth every morning (before breakfast)    LISINOPRIL-HYDROCHLOROTHIAZIDE (PRINZIDE;ZESTORETIC) 20-12.5 MG PER TABLET    TAKE 1 TABLET BY MOUTH DAILY    MECLIZINE (ANTIVERT) 25 MG TABLET    Take 1 tablet by mouth 3 times daily as needed for Dizziness    METFORMIN (GLUCOPHAGE-XR) 500 MG EXTENDED RELEASE TABLET    Take 1,000 mg by mouth in the morning and at bedtime TAKE 4 TABLETS BY MOUTH DAILY    MULTIPLE VITAMIN (THERAPEUTIC) TABS TABLET    Take by mouth daily    OMEGA-3 FATTY ACIDS (FISH OIL) 1000 MG CAPS    Take 1 capsule by mouth daily    PIOGLITAZONE (ACTOS) 30 MG TABLET    TAKE 1 TABLET BY MOUTH DAILY    PRAVASTATIN (PRAVACHOL) 40 MG TABLET    TAKE 1 TABLET BY MOUTH NIGHTLY FOR HYPERLIPIDEMIA    PROMETHAZINE-DEXTROMETHORPHAN (PROMETHAZINE-DM) 6.25-15 MG/5ML SYRUP    Take 5 mLs by mouth every 4 hours as needed for Cough Causes drowsiness    ROPINIROLE (REQUIP) 0.5 MG TABLET    Take 0.5 mg by mouth every evening TAKE 1 TABLET BY MOUTH in the evening    SPACER/AERO-HOLDING CHAMBERS LIZ    1 Device by Does not apply route daily        Vitals signs and nursing note reviewed.    Patient Vitals for the past 4 hrs:   Pulse Resp BP SpO2   01/24/23 1829 71 18 (!) 104/53 97 %   01/24/23 1707 70 20 (!) 112/50 99 % 01/24/23 1530 77 17 128/67 97 %          Physical Exam  Vitals and nursing note reviewed. Constitutional:       General: He is not in acute distress. Appearance: Normal appearance. He is not ill-appearing, toxic-appearing or diaphoretic. HENT:      Head: Normocephalic and atraumatic. Right Ear: External ear normal.      Left Ear: External ear normal.      Nose: Nose normal.   Eyes:      Extraocular Movements: Extraocular movements intact. Conjunctiva/sclera: Conjunctivae normal.   Cardiovascular:      Rate and Rhythm: Normal rate and regular rhythm. Heart sounds: Normal heart sounds. Pulmonary:      Effort: Pulmonary effort is normal. No respiratory distress. Breath sounds: Normal breath sounds. Abdominal:      General: Abdomen is flat. Bowel sounds are normal. There is no distension. Palpations: Abdomen is soft. Tenderness: There is no abdominal tenderness. Musculoskeletal:         General: Normal range of motion. Cervical back: Normal range of motion. Skin:     General: Skin is warm and dry. Capillary Refill: Capillary refill takes less than 2 seconds. Neurological:      General: No focal deficit present. Mental Status: He is alert and oriented to person, place, and time. Psychiatric:         Mood and Affect: Mood normal.         Behavior: Behavior normal.         Thought Content:  Thought content normal.         Judgment: Judgment normal.        Procedures    ED EKG Interpretation  EKG was interpreted in the absence of a cardiologist.    Rate: 77  EKG Interpretation: EKG Interpretation: sinus rhythm  ST Segments: Normal ST segments - NO STEMI    Results for orders placed or performed during the hospital encounter of 01/24/23   XR CHEST (2 VW)    Narrative    EXAMINATION: XR CHEST (2 VW) 1/24/2023 4:00 PM    ACCESSION NUMBER: [id number]    COMPARISON: None available    INDICATION: Chest Pain    TECHNIQUE: PA and lateral views of the chest were obtained. FINDINGS:   Support Devices:   *  None    Cardiac Silhouette: Within normal limits in size. Mediastinum: Normal mediastinal contours. Lungs: No airspace consolidation. No pneumothorax or sizable pleural effusion. Upper Abdomen: Normal     Miscellaneous: No fracture or suspicious osseous lesion. Impression    No acute cardiopulmonary abnormality. CBC   Result Value Ref Range    WBC 8.5 4.3 - 11.1 K/uL    RBC 4.78 4.23 - 5.6 M/uL    Hemoglobin 16.0 13.6 - 17.2 g/dL    Hematocrit 46.3 41.1 - 50.3 %    MCV 96.9 82 - 102 FL    MCH 33.5 (H) 26.1 - 32.9 PG    MCHC 34.6 31.4 - 35.0 g/dL    RDW 13.4 11.9 - 14.6 %    Platelets 78 (L) 076 - 450 K/uL    MPV 10.8 9.4 - 12.3 FL    nRBC 0.00 0.0 - 0.2 K/uL   Comprehensive Metabolic Panel   Result Value Ref Range    Sodium 139 133 - 143 mmol/L    Potassium 4.0 3.5 - 5.1 mmol/L    Chloride 104 101 - 110 mmol/L    CO2 25 21 - 32 mmol/L    Anion Gap 10 2 - 11 mmol/L    Glucose 212 (H) 65 - 100 mg/dL    BUN 18 8 - 23 MG/DL    Creatinine 1.20 0.8 - 1.5 MG/DL    Est, Glom Filt Rate >60 >60 ml/min/1.73m2    Calcium 9.7 8.3 - 10.4 MG/DL    Total Bilirubin 1.6 (H) 0.2 - 1.1 MG/DL    ALT 67 (H) 12 - 65 U/L    AST 55 (H) 15 - 37 U/L    Alk Phosphatase 128 50 - 136 U/L    Total Protein 7.0 6.3 - 8.2 g/dL    Albumin 3.9 3.2 - 4.6 g/dL    Globulin 3.1 2.8 - 4.5 g/dL    Albumin/Globulin Ratio 1.3 0.4 - 1.6     Troponin   Result Value Ref Range    Troponin, High Sensitivity 3.7 0 - 14 pg/mL   Lipase   Result Value Ref Range    Lipase 95 73 - 393 U/L   EKG 12 Lead   Result Value Ref Range    Ventricular Rate 77 BPM    Atrial Rate 77 BPM    P-R Interval 138 ms    QRS Duration 94 ms    Q-T Interval 382 ms    QTc Calculation (Bazett) 432 ms    P Axis 68 degrees    R Axis 74 degrees    T Axis 55 degrees    Diagnosis Normal sinus rhythm         XR CHEST (2 VW)   Final Result   No acute cardiopulmonary abnormality.                   Heart Score for chest pain patients  History: Slightly Suspicious  ECG: Normal  Patient Age: > 65 years  *Risk factors for Atherosclerotic disease: Diabetes Mellitus, Hypertension, Hypercholesterolemia  Risk Factors: > 3 Risk factors or history of atherosclerotic disease*  Troponin: < 1X normal limit  Heart Score Total: 4            Voice dictation software was used during the making of this note. This software is not perfect and grammatical and other typographical errors may be present. This note has not been completely proofread for errors.        Shereen Limon, APRN - [id number]   01/24/23 1196

## 2023-01-25 NOTE — DISCHARGE SUMMARY
I have reviewed discharge instructions with the patient and caregiver. The patient and caregiver verbalized understanding. Patient left ED via Discharge Method: wheelchair to Home with (insert name of family/friend, self, transport son). Opportunity for questions and clarification provided. Patient given 0 scripts. To continue your aftercare when you leave the hospital, you may receive an automated call from our care team to check in on how you are doing. This is a free service and part of our promise to provide the best care and service to meet your aftercare needs. \" If you have questions, or wish to unsubscribe from this service please call [phone]. Thank you for Choosing our [facility] Emergency Department.

## 2023-01-25 NOTE — ED NOTES
Pt assisted to & from [facility] via [facility].      Socorro Gunderson RN  01/24/23 1924

## 2023-01-25 NOTE — ED NOTES
Received handoff from [facility], NP to follow-up on repeat troponin. Repeat troponin ultimately back at 3.9. Initial 3.7. Patient with no active chest pain or discomfort. Discussed results with patient. Patient with no active symptoms and is ready for discharge home. Instructed on need for close follow-up with [facility] cardiology. Given return precautions.          Dru Maza MD  01/24/23 194       Dru Maza MD  01/24/23 0509

## 2023-11-01 ENCOUNTER — APPOINTMENT (RX ONLY)
Dept: URBAN - METROPOLITAN AREA CLINIC 329 | Facility: CLINIC | Age: 83
Setting detail: DERMATOLOGY
End: 2023-11-01

## 2023-11-01 DIAGNOSIS — Z85.828 PERSONAL HISTORY OF OTHER MALIGNANT NEOPLASM OF SKIN: ICD-10-CM

## 2023-11-01 DIAGNOSIS — L57.0 ACTINIC KERATOSIS: ICD-10-CM

## 2023-11-01 DIAGNOSIS — L82.1 OTHER SEBORRHEIC KERATOSIS: ICD-10-CM

## 2023-11-01 DIAGNOSIS — L81.4 OTHER MELANIN HYPERPIGMENTATION: ICD-10-CM

## 2023-11-01 DIAGNOSIS — Z12.83 ENCOUNTER FOR SCREENING FOR MALIGNANT NEOPLASM OF SKIN: ICD-10-CM

## 2023-11-01 DIAGNOSIS — D485 NEOPLASM OF UNCERTAIN BEHAVIOR OF SKIN: ICD-10-CM

## 2023-11-01 PROBLEM — D48.5 NEOPLASM OF UNCERTAIN BEHAVIOR OF SKIN: Status: ACTIVE | Noted: 2023-11-01

## 2023-11-01 PROCEDURE — ? COUNSELING

## 2023-11-01 PROCEDURE — ? LIQUID NITROGEN

## 2023-11-01 PROCEDURE — 17004 DESTROY PREMAL LESIONS 15/>: CPT

## 2023-11-01 PROCEDURE — 11103 TANGNTL BX SKIN EA SEP/ADDL: CPT

## 2023-11-01 PROCEDURE — ? TREATMENT REGIMEN

## 2023-11-01 PROCEDURE — 11102 TANGNTL BX SKIN SINGLE LES: CPT | Mod: 59

## 2023-11-01 PROCEDURE — ? BIOPSY BY SHAVE METHOD

## 2023-11-01 PROCEDURE — ? FULL BODY SKIN EXAM - DECLINED

## 2023-11-01 PROCEDURE — 99213 OFFICE O/P EST LOW 20 MIN: CPT | Mod: 25

## 2023-11-01 ASSESSMENT — LOCATION SIMPLE DESCRIPTION DERM
LOCATION SIMPLE: RIGHT FOREHEAD
LOCATION SIMPLE: LEFT FOREARM
LOCATION SIMPLE: LEFT FOREHEAD
LOCATION SIMPLE: RIGHT UPPER ARM
LOCATION SIMPLE: LEFT ZYGOMA
LOCATION SIMPLE: CHEST
LOCATION SIMPLE: LEFT UPPER ARM
LOCATION SIMPLE: LEFT FOREHEAD
LOCATION SIMPLE: RIGHT EAR
LOCATION SIMPLE: RIGHT CHEEK
LOCATION SIMPLE: LEFT EAR
LOCATION SIMPLE: RIGHT UPPER BACK
LOCATION SIMPLE: LEFT CHEEK
LOCATION SIMPLE: LEFT HAND
LOCATION SIMPLE: NOSE
LOCATION SIMPLE: LEFT TEMPLE

## 2023-11-01 ASSESSMENT — LOCATION DETAILED DESCRIPTION DERM
LOCATION DETAILED: NASAL DORSUM
LOCATION DETAILED: LEFT INFERIOR CENTRAL MALAR CHEEK
LOCATION DETAILED: LEFT MID PREAURICULAR CHEEK
LOCATION DETAILED: RIGHT ANTITRAGUS
LOCATION DETAILED: LEFT ULNAR DORSAL HAND
LOCATION DETAILED: RIGHT ANTERIOR PROXIMAL UPPER ARM
LOCATION DETAILED: LEFT SUPERIOR LATERAL FOREHEAD
LOCATION DETAILED: LEFT FOREHEAD
LOCATION DETAILED: LEFT FOREHEAD
LOCATION DETAILED: LEFT INFERIOR FOREHEAD
LOCATION DETAILED: LEFT RADIAL DORSAL HAND
LOCATION DETAILED: RIGHT INFERIOR LATERAL FOREHEAD
LOCATION DETAILED: LEFT MID TEMPLE
LOCATION DETAILED: RIGHT SUPERIOR LATERAL MALAR CHEEK
LOCATION DETAILED: LEFT INFERIOR TEMPLE
LOCATION DETAILED: LEFT LATERAL MALAR CHEEK
LOCATION DETAILED: LEFT INFERIOR HELIX
LOCATION DETAILED: STERNUM
LOCATION DETAILED: LEFT INFERIOR MEDIAL MALAR CHEEK
LOCATION DETAILED: LEFT SUPERIOR MEDIAL FOREHEAD
LOCATION DETAILED: RIGHT CENTRAL MALAR CHEEK
LOCATION DETAILED: LEFT DISTAL DORSAL FOREARM
LOCATION DETAILED: LEFT SUPERIOR LATERAL MALAR CHEEK
LOCATION DETAILED: LEFT LATERAL ZYGOMA
LOCATION DETAILED: RIGHT LATERAL MALAR CHEEK
LOCATION DETAILED: LEFT PROXIMAL DORSAL FOREARM
LOCATION DETAILED: RIGHT MID-UPPER BACK
LOCATION DETAILED: LEFT INCISURA INTERTRAGICA
LOCATION DETAILED: LEFT SUPERIOR HELIX
LOCATION DETAILED: LEFT ANTERIOR EARLOBE
LOCATION DETAILED: LEFT CENTRAL MALAR CHEEK
LOCATION DETAILED: RIGHT ANTERIOR EARLOBE
LOCATION DETAILED: LEFT ANTERIOR PROXIMAL UPPER ARM
LOCATION DETAILED: LEFT LATERAL FOREHEAD

## 2023-11-01 ASSESSMENT — LOCATION ZONE DERM
LOCATION ZONE: EAR
LOCATION ZONE: NOSE
LOCATION ZONE: HAND
LOCATION ZONE: ARM
LOCATION ZONE: TRUNK
LOCATION ZONE: FACE
LOCATION ZONE: FACE

## 2023-11-01 NOTE — HPI: EVALUATION OF SKIN LESION(S)
28-Nov-2018 20:08
What Type Of Note Output Would You Prefer (Optional)?: Bullet Format
Hpi Title: Evaluation of Skin Lesions
Have Your Spot(S) Been Treated In The Past?: has been treated
Additional History: Pt has spots on his head and face he’d like examined. Pt declines full body exam. Sometimes the spots itch or get irritated, but they do not bleed.

## 2023-11-01 NOTE — PROCEDURE: BIOPSY BY SHAVE METHOD
Detail Level: Detailed
Depth Of Biopsy: dermis
Was A Bandage Applied: Yes
Size Of Lesion In Cm: 1.3
X Size Of Lesion In Cm: 0
Biopsy Type: H and E
Biopsy Method: Dermablade
Anesthesia Type: 1% lidocaine with epinephrine
Anesthesia Volume In Cc: 0.5
Hemostasis: Drysol
Wound Care: Petrolatum
Dressing: bandage
Destruction After The Procedure: No
Type Of Destruction Used: Curettage
Curettage Text: The wound bed was treated with curettage after the biopsy was performed.
Cryotherapy Text: The wound bed was treated with cryotherapy after the biopsy was performed.
Electrodesiccation Text: The wound bed was treated with electrodesiccation after the biopsy was performed.
Electrodesiccation And Curettage Text: The wound bed was treated with electrodesiccation and curettage after the biopsy was performed.
Silver Nitrate Text: The wound bed was treated with silver nitrate after the biopsy was performed.
Lab: 6
Lab Facility: 3
Consent was obtained and risks were reviewed including but not limited to scarring, infection, bleeding, scabbing, incomplete removal, nerve damage and allergy to anesthesia.
Post-Care Instructions: I reviewed with the patient in detail post-care instructions. Patient is to keep the biopsy site dry overnight, and then apply bacitracin twice daily until healed. Patient may apply hydrogen peroxide soaks to remove any crusting.
Notification Instructions: Patient will be notified of biopsy results. However, patient instructed to call the office if not contacted within 2 weeks.
Billing Type: Third-Party Bill
Information: Selecting Yes will display possible errors in your note based on the variables you have selected. This validation is only offered as a suggestion for you. PLEASE NOTE THAT THE VALIDATION TEXT WILL BE REMOVED WHEN YOU FINALIZE YOUR NOTE. IF YOU WANT TO FAX A PRELIMINARY NOTE YOU WILL NEED TO TOGGLE THIS TO 'NO' IF YOU DO NOT WANT IT IN YOUR FAXED NOTE.
Size Of Lesion In Cm: 1

## 2023-12-05 ENCOUNTER — APPOINTMENT (RX ONLY)
Dept: URBAN - METROPOLITAN AREA CLINIC 329 | Facility: CLINIC | Age: 83
Setting detail: DERMATOLOGY
End: 2023-12-05

## 2023-12-05 PROBLEM — C44.42 SQUAMOUS CELL CARCINOMA OF SKIN OF SCALP AND NECK: Status: ACTIVE | Noted: 2023-12-05

## 2023-12-05 PROBLEM — C44.329 SQUAMOUS CELL CARCINOMA OF SKIN OF OTHER PARTS OF FACE: Status: ACTIVE | Noted: 2023-12-05

## 2023-12-05 PROCEDURE — 17273 DSTR MAL LES S/N/H/F/G 2.1-3: CPT

## 2023-12-05 PROCEDURE — ? CURETTAGE AND DESTRUCTION

## 2023-12-05 PROCEDURE — 17282 DSTR MAL LS F/E/E/N/L/M1.1-2: CPT

## 2023-12-05 NOTE — PROCEDURE: CURETTAGE AND DESTRUCTION
Detail Level: Detailed
Number Of Curettages: 3
Size Of Lesion In Cm: 1.2
Size Of Lesion After Curettage: 2
Add Intralesional Injection: No
Concentration (Mg/Ml Or Millions Of Plaque Forming Units/Cc): 0.01
Total Volume (Ccs): 1
Anesthesia Type: 1% lidocaine with epinephrine
Cautery Type: electrodesiccation
What Was Performed First?: Curettage
Final Size Statement: The size of the lesion after curettage was
Additional Information: (Optional): The wound was cleaned, and a pressure dressing was applied.  The patient received detailed post-op instructions.
Consent was obtained from the patient. The risks, benefits and alternatives to therapy were discussed in detail. Specifically, the risks of infection, scarring, bleeding, prolonged wound healing, nerve injury, incomplete removal, allergy to anesthesia and recurrence were addressed. Alternatives to ED&C, such as: surgical removal and XRT were also discussed.  Prior to the procedure, the treatment site was clearly identified and confirmed by the patient.
Post-Care Instructions: I reviewed with the patient in detail post-care instructions. Patient is to keep the area dry for 48 hours, and not to engage in any swimming until the area is healed. Should the patient develop any fevers, chills, bleeding, severe pain patient will contact the office immediately.
Bill As A Line Item Or As Units: Line Item
Size Of Lesion In Cm: 1.1
Size Of Lesion After Curettage: 2.2

## 2023-12-05 NOTE — HPI: PROCEDURE (ELECTRODESSICATION AND CURETTAGE)
[id number]: previous_biopsy_has_been_previously_biopsied
Has The Growth Been Previously Biopsied?: has been previously biopsied

## 2024-02-01 ENCOUNTER — APPOINTMENT (RX ONLY)
Dept: URBAN - METROPOLITAN AREA CLINIC 329 | Facility: CLINIC | Age: 84
Setting detail: DERMATOLOGY
End: 2024-02-01

## 2024-02-01 DIAGNOSIS — D485 NEOPLASM OF UNCERTAIN BEHAVIOR OF SKIN: ICD-10-CM

## 2024-02-01 DIAGNOSIS — L57.0 ACTINIC KERATOSIS: ICD-10-CM

## 2024-02-01 DIAGNOSIS — L57.8 OTHER SKIN CHANGES DUE TO CHRONIC EXPOSURE TO NONIONIZING RADIATION: ICD-10-CM

## 2024-02-01 PROBLEM — D48.5 NEOPLASM OF UNCERTAIN BEHAVIOR OF SKIN: Status: ACTIVE | Noted: 2024-02-01

## 2024-02-01 PROCEDURE — ? BIOPSY BY SHAVE METHOD

## 2024-02-01 PROCEDURE — 99213 OFFICE O/P EST LOW 20 MIN: CPT | Mod: 25

## 2024-02-01 PROCEDURE — ? LIQUID NITROGEN

## 2024-02-01 PROCEDURE — 11102 TANGNTL BX SKIN SINGLE LES: CPT | Mod: 59

## 2024-02-01 PROCEDURE — ? SUNSCREEN RECOMMENDATIONS

## 2024-02-01 PROCEDURE — 17004 DESTROY PREMAL LESIONS 15/>: CPT

## 2024-02-01 PROCEDURE — ? COUNSELING

## 2024-02-01 ASSESSMENT — LOCATION DETAILED DESCRIPTION DERM
LOCATION DETAILED: INFERIOR MID FOREHEAD
LOCATION DETAILED: LEFT TRIANGULAR FOSSA
LOCATION DETAILED: LEFT INCISURA INTERTRAGICA
LOCATION DETAILED: LEFT CENTRAL POSTAURICULAR SKIN
LOCATION DETAILED: RIGHT DISTAL DORSAL FOREARM
LOCATION DETAILED: LEFT MEDIAL FRONTAL SCALP
LOCATION DETAILED: RIGHT ANTERIOR EARLOBE
LOCATION DETAILED: RIGHT ANTIHELIX
LOCATION DETAILED: RIGHT INFERIOR CRUS OF ANTIHELIX
LOCATION DETAILED: LEFT SUPERIOR PREAURICULAR CHEEK
LOCATION DETAILED: RIGHT ANTITRAGUS
LOCATION DETAILED: RIGHT MEDIAL FOREHEAD
LOCATION DETAILED: LEFT INFERIOR CENTRAL MALAR CHEEK
LOCATION DETAILED: RIGHT FOREHEAD
LOCATION DETAILED: RIGHT SUPERIOR HELIX
LOCATION DETAILED: RIGHT SCAPHA
LOCATION DETAILED: LEFT INFERIOR FOREHEAD
LOCATION DETAILED: LEFT INFERIOR LATERAL FOREHEAD
LOCATION DETAILED: LEFT INFERIOR HELIX
LOCATION DETAILED: LEFT FOREHEAD
LOCATION DETAILED: LEFT CENTRAL MALAR CHEEK
LOCATION DETAILED: LEFT DISTAL DORSAL FOREARM
LOCATION DETAILED: LEFT PROXIMAL DORSAL FOREARM

## 2024-02-01 ASSESSMENT — LOCATION SIMPLE DESCRIPTION DERM
LOCATION SIMPLE: SCALP
LOCATION SIMPLE: LEFT CHEEK
LOCATION SIMPLE: RIGHT FOREARM
LOCATION SIMPLE: LEFT SCALP
LOCATION SIMPLE: LEFT FOREARM
LOCATION SIMPLE: RIGHT FOREHEAD
LOCATION SIMPLE: LEFT EAR
LOCATION SIMPLE: RIGHT EAR
LOCATION SIMPLE: INFERIOR FOREHEAD
LOCATION SIMPLE: LEFT FOREHEAD

## 2024-02-01 ASSESSMENT — LOCATION ZONE DERM
LOCATION ZONE: FACE
LOCATION ZONE: SCALP
LOCATION ZONE: EAR
LOCATION ZONE: ARM

## 2024-02-01 NOTE — HPI: EVALUATION OF SKIN LESION(S)
What Type Of Note Output Would You Prefer (Optional)?: Bullet Format
Hpi Title: Evaluation of Skin Lesions
Additional History: Pt following up after ED&C x 2 in December and would like to have his arms and head examined.

## 2024-03-05 ENCOUNTER — APPOINTMENT (OUTPATIENT)
Dept: CT IMAGING | Age: 84
End: 2024-03-05
Payer: MEDICARE

## 2024-03-05 ENCOUNTER — HOSPITAL ENCOUNTER (INPATIENT)
Age: 84
LOS: 3 days | Discharge: HOME | End: 2024-03-08
Attending: EMERGENCY MEDICINE | Admitting: FAMILY MEDICINE
Payer: MEDICARE

## 2024-03-05 DIAGNOSIS — R00.1 BRADYCARDIA: ICD-10-CM

## 2024-03-05 DIAGNOSIS — R18.8 CIRRHOSIS OF LIVER WITH ASCITES, UNSPECIFIED HEPATIC CIRRHOSIS TYPE (HCC): Primary | ICD-10-CM

## 2024-03-05 DIAGNOSIS — K92.1 MELANOTIC STOOLS: ICD-10-CM

## 2024-03-05 DIAGNOSIS — R55 NEAR SYNCOPE: ICD-10-CM

## 2024-03-05 DIAGNOSIS — K74.60 CIRRHOSIS OF LIVER WITH ASCITES, UNSPECIFIED HEPATIC CIRRHOSIS TYPE (HCC): Primary | ICD-10-CM

## 2024-03-05 DIAGNOSIS — K92.1 MELENA: ICD-10-CM

## 2024-03-05 PROBLEM — D69.6 THROMBOCYTOPENIA (HCC): Status: ACTIVE | Noted: 2024-03-05

## 2024-03-05 PROBLEM — E11.9 TYPE 2 DIABETES MELLITUS, WITH LONG-TERM CURRENT USE OF INSULIN (HCC): Status: ACTIVE | Noted: 2021-10-26

## 2024-03-05 PROBLEM — Z79.4 TYPE 2 DIABETES MELLITUS, WITH LONG-TERM CURRENT USE OF INSULIN (HCC): Status: ACTIVE | Noted: 2021-10-26

## 2024-03-05 PROBLEM — K92.2 GI BLEED: Status: ACTIVE | Noted: 2024-03-05

## 2024-03-05 PROBLEM — I95.9 HYPOTENSION: Status: ACTIVE | Noted: 2024-03-05

## 2024-03-05 LAB
ABO + RH BLD: NORMAL
ALBUMIN SERPL-MCNC: 2.6 G/DL (ref 3.2–4.6)
ALBUMIN/GLOB SERPL: 1 (ref 0.4–1.6)
ALP SERPL-CCNC: 89 U/L (ref 50–136)
ALT SERPL-CCNC: 40 U/L (ref 12–65)
ANION GAP SERPL CALC-SCNC: 1 MMOL/L (ref 2–11)
AST SERPL-CCNC: 34 U/L (ref 15–37)
BASOPHILS # BLD: 0 K/UL (ref 0–0.2)
BASOPHILS NFR BLD: 1 % (ref 0–2)
BILIRUB SERPL-MCNC: 0.7 MG/DL (ref 0.2–1.1)
BLOOD GROUP ANTIBODIES SERPL: NORMAL
BUN SERPL-MCNC: 33 MG/DL (ref 8–23)
CALCIUM SERPL-MCNC: 8.1 MG/DL (ref 8.3–10.4)
CHLORIDE SERPL-SCNC: 115 MMOL/L (ref 103–113)
CO2 SERPL-SCNC: 24 MMOL/L (ref 21–32)
CREAT SERPL-MCNC: 1 MG/DL (ref 0.8–1.5)
DIFFERENTIAL METHOD BLD: ABNORMAL
EKG ATRIAL RATE: 58 BPM
EKG DIAGNOSIS: NORMAL
EKG P AXIS: 52 DEGREES
EKG P-R INTERVAL: 159 MS
EKG Q-T INTERVAL: 440 MS
EKG QRS DURATION: 90 MS
EKG QTC CALCULATION (BAZETT): 433 MS
EKG R AXIS: 64 DEGREES
EKG T AXIS: 65 DEGREES
EKG VENTRICULAR RATE: 58 BPM
EOSINOPHIL # BLD: 0.2 K/UL (ref 0–0.8)
EOSINOPHIL NFR BLD: 7 % (ref 0.5–7.8)
ERYTHROCYTE [DISTWIDTH] IN BLOOD BY AUTOMATED COUNT: 12.9 % (ref 11.9–14.6)
GLOBULIN SER CALC-MCNC: 2.6 G/DL (ref 2.8–4.5)
GLUCOSE BLD STRIP.AUTO-MCNC: 166 MG/DL (ref 65–100)
GLUCOSE SERPL-MCNC: 178 MG/DL (ref 65–100)
HCT VFR BLD AUTO: 37.6 % (ref 41.1–50.3)
HCT VFR BLD AUTO: 42.7 % (ref 41.1–50.3)
HGB BLD-MCNC: 12.6 G/DL (ref 13.6–17.2)
HGB BLD-MCNC: 14.1 G/DL (ref 13.6–17.2)
IMM GRANULOCYTES # BLD AUTO: 0 K/UL (ref 0–0.5)
IMM GRANULOCYTES NFR BLD AUTO: 0 % (ref 0–5)
INR PPP: 1.3
LACTATE SERPL-SCNC: 1.1 MMOL/L (ref 0.4–2)
LYMPHOCYTES # BLD: 0.8 K/UL (ref 0.5–4.6)
LYMPHOCYTES NFR BLD: 25 % (ref 13–44)
MCH RBC QN AUTO: 32.4 PG (ref 26.1–32.9)
MCHC RBC AUTO-ENTMCNC: 33.5 G/DL (ref 31.4–35)
MCV RBC AUTO: 96.7 FL (ref 82–102)
MONOCYTES # BLD: 0.3 K/UL (ref 0.1–1.3)
MONOCYTES NFR BLD: 10 % (ref 4–12)
NEUTS SEG # BLD: 1.8 K/UL (ref 1.7–8.2)
NEUTS SEG NFR BLD: 58 % (ref 43–78)
NRBC # BLD: 0 K/UL (ref 0–0.2)
PLATELET # BLD AUTO: 76 K/UL (ref 150–450)
PMV BLD AUTO: 10.4 FL (ref 9.4–12.3)
POTASSIUM SERPL-SCNC: 4.4 MMOL/L (ref 3.5–5.1)
PROT SERPL-MCNC: 5.2 G/DL (ref 6.3–8.2)
PROTHROMBIN TIME: 16.5 SEC (ref 11.3–14.9)
RBC # BLD AUTO: 3.89 M/UL (ref 4.23–5.6)
SERVICE CMNT-IMP: ABNORMAL
SODIUM SERPL-SCNC: 140 MMOL/L (ref 136–146)
SPECIMEN EXP DATE BLD: NORMAL
T4 FREE SERPL-MCNC: 1.3 NG/DL (ref 0.78–1.46)
TROPONIN I SERPL HS-MCNC: 7.4 PG/ML (ref 0–14)
TSH W FREE THYROID IF ABNORMAL: 0.13 UIU/ML (ref 0.36–3.74)
WBC # BLD AUTO: 3.2 K/UL (ref 4.3–11.1)

## 2024-03-05 PROCEDURE — C9113 INJ PANTOPRAZOLE SODIUM, VIA: HCPCS | Performed by: EMERGENCY MEDICINE

## 2024-03-05 PROCEDURE — 86850 RBC ANTIBODY SCREEN: CPT

## 2024-03-05 PROCEDURE — A4216 STERILE WATER/SALINE, 10 ML: HCPCS | Performed by: FAMILY MEDICINE

## 2024-03-05 PROCEDURE — 2580000003 HC RX 258

## 2024-03-05 PROCEDURE — 80053 COMPREHEN METABOLIC PANEL: CPT

## 2024-03-05 PROCEDURE — 86901 BLOOD TYPING SEROLOGIC RH(D): CPT

## 2024-03-05 PROCEDURE — C9113 INJ PANTOPRAZOLE SODIUM, VIA: HCPCS | Performed by: FAMILY MEDICINE

## 2024-03-05 PROCEDURE — 6360000002 HC RX W HCPCS: Performed by: EMERGENCY MEDICINE

## 2024-03-05 PROCEDURE — 85610 PROTHROMBIN TIME: CPT

## 2024-03-05 PROCEDURE — 2580000003 HC RX 258: Performed by: FAMILY MEDICINE

## 2024-03-05 PROCEDURE — 6360000002 HC RX W HCPCS

## 2024-03-05 PROCEDURE — 85018 HEMOGLOBIN: CPT

## 2024-03-05 PROCEDURE — 93010 ELECTROCARDIOGRAM REPORT: CPT | Performed by: INTERNAL MEDICINE

## 2024-03-05 PROCEDURE — 1100000003 HC PRIVATE W/ TELEMETRY

## 2024-03-05 PROCEDURE — 36415 COLL VENOUS BLD VENIPUNCTURE: CPT

## 2024-03-05 PROCEDURE — 6370000000 HC RX 637 (ALT 250 FOR IP): Performed by: FAMILY MEDICINE

## 2024-03-05 PROCEDURE — 99222 1ST HOSP IP/OBS MODERATE 55: CPT | Performed by: STUDENT IN AN ORGANIZED HEALTH CARE EDUCATION/TRAINING PROGRAM

## 2024-03-05 PROCEDURE — 82962 GLUCOSE BLOOD TEST: CPT

## 2024-03-05 PROCEDURE — 96374 THER/PROPH/DIAG INJ IV PUSH: CPT

## 2024-03-05 PROCEDURE — 86900 BLOOD TYPING SEROLOGIC ABO: CPT

## 2024-03-05 PROCEDURE — A4216 STERILE WATER/SALINE, 10 ML: HCPCS | Performed by: EMERGENCY MEDICINE

## 2024-03-05 PROCEDURE — 6360000002 HC RX W HCPCS: Performed by: FAMILY MEDICINE

## 2024-03-05 PROCEDURE — 99285 EMERGENCY DEPT VISIT HI MDM: CPT

## 2024-03-05 PROCEDURE — 85014 HEMATOCRIT: CPT

## 2024-03-05 PROCEDURE — 84443 ASSAY THYROID STIM HORMONE: CPT

## 2024-03-05 PROCEDURE — 2580000003 HC RX 258: Performed by: EMERGENCY MEDICINE

## 2024-03-05 PROCEDURE — 85025 COMPLETE CBC W/AUTO DIFF WBC: CPT

## 2024-03-05 PROCEDURE — 6360000004 HC RX CONTRAST MEDICATION: Performed by: EMERGENCY MEDICINE

## 2024-03-05 PROCEDURE — 84439 ASSAY OF FREE THYROXINE: CPT

## 2024-03-05 PROCEDURE — 93005 ELECTROCARDIOGRAM TRACING: CPT | Performed by: EMERGENCY MEDICINE

## 2024-03-05 PROCEDURE — 74177 CT ABD & PELVIS W/CONTRAST: CPT

## 2024-03-05 PROCEDURE — 83605 ASSAY OF LACTIC ACID: CPT

## 2024-03-05 PROCEDURE — 84484 ASSAY OF TROPONIN QUANT: CPT

## 2024-03-05 RX ORDER — ROPINIROLE 1 MG/1
1 TABLET, FILM COATED ORAL
Status: DISCONTINUED | OUTPATIENT
Start: 2024-03-05 | End: 2024-03-08 | Stop reason: HOSPADM

## 2024-03-05 RX ORDER — POLYETHYLENE GLYCOL 3350 17 G/17G
17 POWDER, FOR SOLUTION ORAL DAILY PRN
Status: DISCONTINUED | OUTPATIENT
Start: 2024-03-05 | End: 2024-03-08 | Stop reason: HOSPADM

## 2024-03-05 RX ORDER — PRAVASTATIN SODIUM 20 MG
40 TABLET ORAL NIGHTLY
Status: DISCONTINUED | OUTPATIENT
Start: 2024-03-05 | End: 2024-03-08 | Stop reason: HOSPADM

## 2024-03-05 RX ORDER — INSULIN LISPRO 100 [IU]/ML
0-4 INJECTION, SOLUTION INTRAVENOUS; SUBCUTANEOUS
Status: DISCONTINUED | OUTPATIENT
Start: 2024-03-05 | End: 2024-03-08 | Stop reason: HOSPADM

## 2024-03-05 RX ORDER — METFORMIN HYDROCHLORIDE 500 MG/1
1000 TABLET, EXTENDED RELEASE ORAL 2 TIMES DAILY WITH MEALS
Status: DISCONTINUED | OUTPATIENT
Start: 2024-03-07 | End: 2024-03-08 | Stop reason: HOSPADM

## 2024-03-05 RX ORDER — ACETAMINOPHEN 325 MG/1
650 TABLET ORAL EVERY 6 HOURS PRN
Status: DISCONTINUED | OUTPATIENT
Start: 2024-03-05 | End: 2024-03-08 | Stop reason: HOSPADM

## 2024-03-05 RX ORDER — SODIUM CHLORIDE 0.9 % (FLUSH) 0.9 %
5-40 SYRINGE (ML) INJECTION PRN
Status: DISCONTINUED | OUTPATIENT
Start: 2024-03-05 | End: 2024-03-08 | Stop reason: HOSPADM

## 2024-03-05 RX ORDER — GABAPENTIN 300 MG/1
300 CAPSULE ORAL NIGHTLY
Status: DISCONTINUED | OUTPATIENT
Start: 2024-03-05 | End: 2024-03-08 | Stop reason: HOSPADM

## 2024-03-05 RX ORDER — INSULIN LISPRO 100 [IU]/ML
0-4 INJECTION, SOLUTION INTRAVENOUS; SUBCUTANEOUS NIGHTLY
Status: DISCONTINUED | OUTPATIENT
Start: 2024-03-05 | End: 2024-03-08 | Stop reason: HOSPADM

## 2024-03-05 RX ORDER — SODIUM CHLORIDE 9 MG/ML
INJECTION, SOLUTION INTRAVENOUS PRN
Status: DISCONTINUED | OUTPATIENT
Start: 2024-03-05 | End: 2024-03-08 | Stop reason: HOSPADM

## 2024-03-05 RX ORDER — INSULIN GLARGINE 100 [IU]/ML
10 INJECTION, SOLUTION SUBCUTANEOUS NIGHTLY
Status: DISCONTINUED | OUTPATIENT
Start: 2024-03-05 | End: 2024-03-08 | Stop reason: HOSPADM

## 2024-03-05 RX ORDER — SODIUM CHLORIDE 9 MG/ML
INJECTION, SOLUTION INTRAVENOUS CONTINUOUS
Status: DISCONTINUED | OUTPATIENT
Start: 2024-03-05 | End: 2024-03-06

## 2024-03-05 RX ORDER — MECLIZINE HYDROCHLORIDE 25 MG/1
25 TABLET ORAL 3 TIMES DAILY PRN
Status: DISCONTINUED | OUTPATIENT
Start: 2024-03-05 | End: 2024-03-08 | Stop reason: HOSPADM

## 2024-03-05 RX ORDER — SODIUM CHLORIDE 0.9 % (FLUSH) 0.9 %
5-40 SYRINGE (ML) INJECTION EVERY 12 HOURS SCHEDULED
Status: DISCONTINUED | OUTPATIENT
Start: 2024-03-05 | End: 2024-03-08 | Stop reason: HOSPADM

## 2024-03-05 RX ORDER — ACETAMINOPHEN 650 MG/1
650 SUPPOSITORY RECTAL EVERY 6 HOURS PRN
Status: DISCONTINUED | OUTPATIENT
Start: 2024-03-05 | End: 2024-03-08 | Stop reason: HOSPADM

## 2024-03-05 RX ORDER — ONDANSETRON 4 MG/1
4 TABLET, ORALLY DISINTEGRATING ORAL EVERY 8 HOURS PRN
Status: DISCONTINUED | OUTPATIENT
Start: 2024-03-05 | End: 2024-03-08 | Stop reason: HOSPADM

## 2024-03-05 RX ORDER — ONDANSETRON 2 MG/ML
4 INJECTION INTRAMUSCULAR; INTRAVENOUS EVERY 6 HOURS PRN
Status: DISCONTINUED | OUTPATIENT
Start: 2024-03-05 | End: 2024-03-08 | Stop reason: HOSPADM

## 2024-03-05 RX ORDER — VITAMIN B COMPLEX
2000 TABLET ORAL DAILY
Status: DISCONTINUED | OUTPATIENT
Start: 2024-03-05 | End: 2024-03-08 | Stop reason: HOSPADM

## 2024-03-05 RX ORDER — LEVOTHYROXINE SODIUM 0.12 MG/1
125 TABLET ORAL
Status: DISCONTINUED | OUTPATIENT
Start: 2024-03-06 | End: 2024-03-08 | Stop reason: HOSPADM

## 2024-03-05 RX ADMIN — OCTREOTIDE ACETATE 25 MCG/HR: 500 INJECTION, SOLUTION INTRAVENOUS; SUBCUTANEOUS at 18:44

## 2024-03-05 RX ADMIN — SODIUM CHLORIDE: 9 INJECTION, SOLUTION INTRAVENOUS at 20:21

## 2024-03-05 RX ADMIN — GABAPENTIN 300 MG: 300 CAPSULE ORAL at 21:11

## 2024-03-05 RX ADMIN — SODIUM CHLORIDE, PRESERVATIVE FREE 40 MG: 5 INJECTION INTRAVENOUS at 21:11

## 2024-03-05 RX ADMIN — ROPINIROLE HYDROCHLORIDE 1 MG: 1 TABLET, FILM COATED ORAL at 21:11

## 2024-03-05 RX ADMIN — IOPAMIDOL 100 ML: 755 INJECTION, SOLUTION INTRAVENOUS at 13:21

## 2024-03-05 RX ADMIN — PRAVASTATIN SODIUM 40 MG: 20 TABLET ORAL at 21:11

## 2024-03-05 RX ADMIN — SODIUM CHLORIDE, PRESERVATIVE FREE 10 ML: 5 INJECTION INTRAVENOUS at 21:11

## 2024-03-05 RX ADMIN — PANTOPRAZOLE SODIUM 40 MG: 40 INJECTION, POWDER, FOR SOLUTION INTRAVENOUS at 15:20

## 2024-03-05 ASSESSMENT — ENCOUNTER SYMPTOMS
BLOOD IN STOOL: 1
DIARRHEA: 0
COUGH: 0
NAUSEA: 0
SHORTNESS OF BREATH: 0
VOMITING: 0
RECTAL PAIN: 0
CONSTIPATION: 0
ABDOMINAL PAIN: 1
BACK PAIN: 0

## 2024-03-05 ASSESSMENT — PAIN - FUNCTIONAL ASSESSMENT: PAIN_FUNCTIONAL_ASSESSMENT: NONE - DENIES PAIN

## 2024-03-05 NOTE — H&P
Hospitalist History and Physical   Admit Date:  3/5/2024 11:08 AM   Name:  Dennis Santos   Age:  83 y.o.  Sex:  male  :  1940   MRN:  [id number]   Room:  ER    Presenting/Chief Complaint: Rectal Bleeding     Reason(s) for Admission: GI bleed [K92.2]     History of Present Illness:   Dennis Santos is a 83 y.o. male with medical history of cirrhosis-nonalcoholic, diabetes mellitus type 2 on insulin, neuropathy bilateral feet, GERD, hyperlipidemia, restless leg, history of obstructive sleep apnea not on CPAP, hypothyroidism and vitamin D deficiency      Triage-  \"Patient a 82 y/o Male reports to the ED with c/c f Blood in his stool since yesterday. States he was at the [facility] office this morning for a check -up. Was pale and diaphoretic at the MD office and EMS was called .  Patient was bradycardic and hypotensivewith EMS. Received 2 doses of 1 mg of Atropine and 1L NS.   EMS Vitals   HR 50  BP 80'40  \"    Patient states he had 5 episodes of melena yesterday, felt weak at times dizzy, says that he heard that the kidney problems can cause black stools.  He had a regular appointment with his primary care today while walking to the clinic he felt dizzy, blurred vision.  He was found to be bradycardic hypotensive, received 2 doses of 1 mg atropine.  Sent to emergency room for further evaluation.    Patient otherwise did not complain of any nausea vomiting or abdominal pain or fever or chills.  No NSAID use    The time of my examination patient awake alert Otto x 3 complains of generalized weakness, says dizziness is much better.  Blood pressure in ER initially 100/58.  Heart rate ranging from 57 to high of 62.  EKG sinus bradycardia no acute ST-T wave changes  INR 1.3, HB 12.6-- ON - 16  Troponin of 7.4  Ct abd pelvis iv contrast-  IMPRESSION:  Cirrhotic liver without a discrete lesion, there are associated  serpiginous vessels around the periphery of the spleen suggestive of

## 2024-03-05 NOTE — ED PROVIDER NOTES
Emergency Department Provider Note       PCP: Adriana Vogt MD   Age: 83 y.o.   Sex: male     DISPOSITION Admitted 03/05/2024 03:33:27 PM       ICD-10-CM    1. Cirrhosis of liver with ascites, unspecified hepatic cirrhosis type (HCC)  K74.60     R18.8       2. Melanotic stools  K92.1       3. Melena  K92.1       4. Near syncope  R55           Medical Decision Making     83-year-old male with history of GERD, restless leg syndrome, hypothyroidism, peripheral neuropathy, type 2 diabetes mellitus, cirrhosis, bronchial mass, presents to ED with complaint of near syncope, fatigue. Patient with 5-6 episodes of melanotic stool on yesterday. Blood pressure stabilized on arrival. Rectal exam with light brown stool; faintly guaiac positive stool.. BUN elevated at 33. H&H 12.6/37.6. Most recent for comparison from 1/20/2023 when H&H was 16.0/46.3. Patient given Protonix 40 mg IV. CT abdomen and pelvis with cirrhotic liver without discrete lesion. No other acute or concerning findings noted.  Hemodynamically stable at this time; heart rate 58-63.  Hospitalist consulted for admission.  Case discussed with Dr. Pearson via [employer].    ED Course as of 03/05/24 1641   Tue Mar 05, 2024   1309 Hemoglobin Quant(!): 12.6 [DF]   1309 Hematocrit(!): 37.6 [DF]   1407 CT abd/pelvis    IMPRESSION:  Cirrhotic liver without a discrete lesion, there are associated  serpiginous vessels around the periphery of the spleen suggestive of varices.     No free intraperitoneal fluid, air, or suspicious adenopathy     Scattered colonic diverticula without CT evidence of acute diverticulitis.     Degenerative bony change      [DF]      ED Course User Index  [DF] Dru Moise Jr., MD     1 or more acute illnesses that pose a threat to life or bodily function.   1 or more chronic illnesses with a severe exacerbation or progression.  Chronic medical problems impacting care include cirrhosis, type 2 diabetes mellitus.  Shared medical  Pepto-Bismol yesterday.        ROS     Review of Systems   Constitutional:  Positive for diaphoresis and fatigue. Negative for fever.   HENT:  Negative for congestion.    Respiratory:  Negative for cough and shortness of breath.    Cardiovascular:  Negative for chest pain.   Gastrointestinal:  Positive for abdominal pain and blood in stool. Negative for diarrhea, nausea, rectal pain and vomiting.   Genitourinary:  Negative for dysuria and flank pain.   Musculoskeletal:  Negative for back pain and myalgias.   Skin:  Negative for wound.   Neurological:  Positive for light-headedness. Negative for facial asymmetry, speech difficulty, weakness and headaches.        Physical Exam     Vitals signs and nursing note reviewed:  Vitals:    03/05/24 1430 03/05/24 1445 03/05/24 1500 03/05/24 1515   BP: 131/79 (!) 117/92 138/79 (!) 153/79   Pulse: 59 62 57 58   Resp: 13 19 15 10   Temp:       TempSrc:       SpO2: 99% 99% 99% 99%   Weight:       Height:          Physical Exam  Vitals and nursing note reviewed. Exam conducted with a chaperone present.   Constitutional:       Appearance: Normal appearance.   HENT:      Head: Normocephalic.      Mouth/Throat:      Mouth: Mucous membranes are moist.   Eyes:      Extraocular Movements: Extraocular movements intact.      Pupils: Pupils are equal, round, and reactive to light.   Cardiovascular:      Rate and Rhythm: Bradycardia present.   Pulmonary:      Effort: Pulmonary effort is normal.      Breath sounds: Normal breath sounds.   Abdominal:      General: Bowel sounds are normal. There is distension.      Palpations: Abdomen is soft.      Tenderness: There is abdominal tenderness. There is no right CVA tenderness, left CVA tenderness, guarding or rebound.      Comments: Soft.  Mild diffuse tenderness to palpation.  No rebound or guarding.   Genitourinary:     Comments: RN present for exam.  Rectal exam with light brown stool.  Trace guaiac positive.  No fissures or hemorrhoids  (PF) 0.9 % 10 mL   injection, Q12H  insulin lispro (HUMALOG) injection vial 0-4 Units, TID WC  insulin lispro (HUMALOG) injection vial 0-4 Units, Nightly  [Held by provider] insulin glargine (LANTUS) injection vial 10   Units, Nightly  octreotide (SANDOSTATIN) 500 mcg in sodium chloride 0.9 % 100 mL   infusion, Continuous        Allergies   Patient has no known allergies.    Social History     Social History       Tobacco History       Smoking Status  Never      Smokeless Tobacco Use  Never              Alcohol History       Alcohol Use Status  No              Drug Use       Drug Use Status  No              Sexual Activity       Sexually Active  Not Asked                    Family History     Family History   Problem Relation Age of Onset    Diabetes Mother     Cancer Father        Review of Systems   Review of Systems   Constitutional:  Positive for fatigue. Negative for appetite   change and unexpected weight change.   Cardiovascular:  Positive for chest pain.   Gastrointestinal:  Positive for abdominal pain and blood in   stool. Negative for constipation, diarrhea, nausea and vomiting.   Neurological:  Positive for weakness.   All other systems reviewed and are negative.       Physical Exam   BP (!) 153/79   Pulse 58   Temp 97.7 °F (36.5 °C) (Oral)     Resp 10   Ht 1.803 m (5' 11\")   Wt 89.8 kg (198 lb)   SpO2 99%     BMI 27.62 kg/m²      Physical Exam  Vitals and nursing note reviewed.   Constitutional:       Appearance: Normal appearance.   HENT:      Head: Normocephalic and atraumatic.   Eyes:      Conjunctiva/sclera: Conjunctivae normal.   Cardiovascular:      Rate and Rhythm: Normal rate and regular rhythm.   Pulmonary:      Effort: Pulmonary effort is normal.      Breath sounds: Normal breath sounds.   Abdominal:      General: Abdomen is flat. Bowel sounds are normal. There is no   distension.      Palpations: Abdomen is soft.      Tenderness: There is abdominal tenderness

## 2024-03-05 NOTE — CONSULTS
Consult Note            Date:3/5/2024        Patient Name:Dennis Santos     YOB: 1940     Age:83 y.o.    Inpatient consult to GI  Consult performed by: Syed Orourke PA  Consult ordered by: Terrence Pearson MD          Chief Complaint     Chief Complaint   Patient presents with    Rectal Bleeding      History Obtained From   patient    History of Present Illness     Patient is an 83 year old male, with a hx of cirrhosis, GERD, DM, who was admitted for melena and hypotension. Hgb 12.6 from 16 in January (baseline in 2021 from 12). Hypotensive with BP 80/60s on admission. Patient reports melena 6x over the past day; no prior hx of GI bleed. No melena today. States that he has felt fatigued and having chest pain as well, in which his PCP gave him ASA today. Began having RUQ pain today, but none currently. Per grandson, patient and wife also with diarrhea/malaise possible due to gastroenteritis. He has a hx of cirrhosis and had an EGD in 2022 in which he had PH gastropathy without varices, lesions, or ulcers. Colonoscopy in 2022 as well with 4x polyps and internal hemorrhoids. Patient is unsure who his normal OP GI doctor is now; possible at [facility] though I cannot find any notes (last was scopes with Dr. Wilson). No blood thinners; no NSAIDS. Non-smoker. He denies any nausea, vomiting, loss of appetite, weight loss, abdominal distention.     No blood thinners. Patient on clears.    Labs: Hgb 12.6 (16 in January and baseline of 12 in 2021), BUN/Cr 33, Plt 76 (78), PT/INR 16.5/1.3. Troponin negative    Imaging: CT AP: Cirrhotic liver without a discrete lesion, there are associated  serpiginous vessels around the periphery of the spleen suggestive of varices.     No free intraperitoneal fluid, air, or suspicious adenopathy     Scattered colonic diverticula without CT evidence of acute diverticulitis.     Degenerative bony change    Medications: IV PPI; will start on octreotide.     Past Medical  History     Past Medical History:   Diagnosis Date    Abnormal clinical finding 10/17/2016    Allergic rhinitis     Anemia 10/17/2016    Arteriosclerosis of carotid artery 10/17/2016    Arthritis     hands    BPH (benign prostatic hyperplasia) 10/17/2016    Dermatophytosis of nail     Diabetes ([facility])     avg fasting BS = 150-170; A1C 7.3 10/2021     Diabetic neuropathy, painful ([facility]) 10/17/2016    Disorder of fluid or electrolyte 10/17/2016    Dizziness and giddiness 10/17/2016    dizziness, ataxia 5/2021 ER visit    DM (diabetes mellitus), type 2, uncontrolled 10/17/2016    oral agents, -150, S&S of hypoglycemia BS 45    Dyslipidemia 10/17/2016    GERD (gastroesophageal reflux disease) 10/17/2016    Hematoma, subungual, great toe, left 10/17/2016    Hip tendinitis 10/17/2016    Hx of blood clots 1987/2002    DVTs to both legs     Hypercholesterolemia 10/17/2016    Hypertension     controlled with meds    Hypertension, essential, benign 10/17/2016    controlled with med    Hypomagnesemia     Joint derangement 10/17/2016    Lumbar stenosis with neurogenic claudication 10/17/2016    Malaise and fatigue 10/17/2016    Mononeuritis of lower extremity 10/17/2016    Muscle spasm of back 10/17/2016    Neuropathy     Nontoxic uninodular goiter 10/17/2016    PAD (peripheral artery disease) ([facility]) 10/17/2016    Pain in limb 10/17/2016    Pain, foot     Peripheral neuropathy 10/17/2016    PLMD (periodic limb movement disorder) 10/17/2016    Polyarthritis 10/17/2016    Radiculopathy, lumbosacral region 10/17/2016    RLS (restless legs syndrome) 10/17/2016    Skin cancer     face, head    Thromboembolus ([facility])     blood clot in both legs 1987 ,2002    Thyroid disease     hypo    Unspecified sleep apnea     wears cpap \" most \" of the time    Weakness         Past Surgical History     Past Surgical History:   Procedure Laterality Date    APPENDECTOMY      CHOLECYSTECTOMY      COLONOSCOPY  11/29/2017    COLONOSCOPY N/A  11/29/2017    COLONOSCOPY / BMI=27 performed by Julio Lemus MD at [facility] ENDOSCOPY    COLONOSCOPY N/A 6/10/2022    COLONOSCOPY POLYPECTOMY SNARE/COLD BIOPSY performed by Julio Wilson MD at [facility] ENDOSCOPY    HEENT      right ear surgery-to improve hearing    OTHER SURGICAL HISTORY      colonscopy     SKIN BIOPSY      TOTAL KNEE ARTHROPLASTY  2002    right and left    UPPER GASTROINTESTINAL ENDOSCOPY N/A 6/10/2022    EGD BIOPSY with dilation performed by Julio Wilson MD at [facility] ENDOSCOPY        Medications     Prior to Admission medications    Medication Sig Start Date End Date Taking? Authorizing Provider   promethazine-dextromethorphan (PROMETHAZINE-DM) 6.25-15 MG/5ML syrup Take 5 mLs by mouth every 4 hours as needed for Cough Causes drowsiness 12/22/22   Giulia Santos APRN - CNP   albuterol sulfate HFA (PROVENTIL HFA) 108 (90 Base) MCG/ACT inhaler Inhale 2 puffs into the lungs every 6 hours as needed for Wheezing or Shortness of Breath 12/22/22   Giulia Santos APRN - CNP   Spacer/Aero-Holding Chambers LIZ 1 Device by Does not apply route daily 12/22/22   Giulia Santos APRN - CNP   meclizine (ANTIVERT) 25 MG tablet Take 1 tablet by mouth 3 times daily as needed for Dizziness 8/24/22   Giulia Santos, APRN - TAMIKO   Multiple Vitamin (THERAPEUTIC) TABS tablet Take by mouth daily    Jaskaran Lafleur MD   Omega-3 Fatty Acids (FISH OIL) 1000 MG CAPS Take 1 capsule by mouth daily    Jaskaran Lafleur MD   calcium-vitamin D (OSCAL) 250-125 MG-UNIT per tablet Take 1 tablet by mouth daily    Jaskaran Lafleur MD   acetaminophen (TYLENOL) 500 MG tablet Take 1,000 mg by mouth 2 times daily    Automatic Reconciliation, Ar   ascorbic acid (VITAMIN C) 500 MG tablet Take by mouth daily    Automatic Reconciliation, Ar   Cholecalciferol 50 MCG (2000 UT) CAPS Take by mouth daily     Automatic Reconciliation, Ar   empagliflozin (JARDIANCE) 25 MG tablet Take 25 mg by mouth every other day    Automatic

## 2024-03-05 NOTE — ED TRIAGE NOTES
Patient a 82 y/o Male reports to the ED with c/c f Blood in his stool since yesterday. States he was at the Doctors office this morning for a check -up. Was pale and diaphoretic at the MD office and EMS was called .  Patient was bradycardic and hypotensivewith EMS. Received 2 doses of 1 mg of Atropine and 1L NS.   EMS Vitals   HR 50  BP 80'40

## 2024-03-05 NOTE — ED NOTES
TRANSFER - OUT REPORT:    Verbal report given to JAI Foster on Dennis Santos  being transferred to [facility] for routine progression of patient care       Report consisted of patient's Situation, Background, Assessment and   Recommendations(SBAR).     Information from the following report(s) ED SBAR was reviewed with the receiving nurse.    Lines:   Peripheral IV 03/05/24 Left Antecubital (Active)   Site Assessment Clean, dry & intact 03/05/24 1713   Line Status Blood return noted 03/05/24 1713   Phlebitis Assessment No symptoms 03/05/24 1713   Infiltration Assessment 0 03/05/24 1713       Peripheral IV 03/05/24 Right Antecubital (Active)   Site Assessment Clean, dry & intact 03/05/24 1713   Line Status Blood return noted 03/05/24 1713   Phlebitis Assessment No symptoms 03/05/24 1713   Infiltration Assessment 0 03/05/24 1713        Opportunity for questions and clarification was provided.      Patient transported with:  Mayank Hilliard RN  03/05/24 1713

## 2024-03-05 NOTE — PROGRESS NOTES
I have discussed with the patient the rationale for blood component transfusion; its benefits in treating or preventing fatigue, organ damage, or death; and its risk which includes mild transfusion reactions, rare risk of blood borne infection, or more serious but rare reactions. I have discussed the alternatives to transfusion, including the risk and consequences of not receiving transfusion. The patient had an opportunity to ask questions and had agreed to proceed with transfusion of blood components.

## 2024-03-06 ENCOUNTER — ANESTHESIA (OUTPATIENT)
Dept: ENDOSCOPY | Age: 84
End: 2024-03-06
Payer: MEDICARE

## 2024-03-06 ENCOUNTER — ANESTHESIA EVENT (OUTPATIENT)
Dept: ENDOSCOPY | Age: 84
End: 2024-03-06
Payer: MEDICARE

## 2024-03-06 LAB
ANION GAP SERPL CALC-SCNC: 4 MMOL/L (ref 2–11)
BASOPHILS # BLD: 0 K/UL (ref 0–0.2)
BASOPHILS NFR BLD: 1 % (ref 0–2)
BUN SERPL-MCNC: 20 MG/DL (ref 8–23)
CALCIUM SERPL-MCNC: 8.7 MG/DL (ref 8.3–10.4)
CHLORIDE SERPL-SCNC: 114 MMOL/L (ref 103–113)
CO2 SERPL-SCNC: 25 MMOL/L (ref 21–32)
CREAT SERPL-MCNC: 0.8 MG/DL (ref 0.8–1.5)
DIFFERENTIAL METHOD BLD: ABNORMAL
EOSINOPHIL # BLD: 0.4 K/UL (ref 0–0.8)
EOSINOPHIL NFR BLD: 10 % (ref 0.5–7.8)
ERYTHROCYTE [DISTWIDTH] IN BLOOD BY AUTOMATED COUNT: 12.6 % (ref 11.9–14.6)
GLUCOSE BLD STRIP.AUTO-MCNC: 117 MG/DL (ref 65–100)
GLUCOSE BLD STRIP.AUTO-MCNC: 123 MG/DL (ref 65–100)
GLUCOSE BLD STRIP.AUTO-MCNC: 129 MG/DL (ref 65–100)
GLUCOSE BLD STRIP.AUTO-MCNC: 207 MG/DL (ref 65–100)
GLUCOSE SERPL-MCNC: 124 MG/DL (ref 65–100)
HCT VFR BLD AUTO: 40.6 % (ref 41.1–50.3)
HCT VFR BLD AUTO: 40.9 % (ref 41.1–50.3)
HGB BLD-MCNC: 13.8 G/DL (ref 13.6–17.2)
HGB BLD-MCNC: 14.1 G/DL (ref 13.6–17.2)
IMM GRANULOCYTES # BLD AUTO: 0 K/UL (ref 0–0.5)
IMM GRANULOCYTES NFR BLD AUTO: 1 % (ref 0–5)
LYMPHOCYTES # BLD: 1.3 K/UL (ref 0.5–4.6)
LYMPHOCYTES NFR BLD: 37 % (ref 13–44)
MCH RBC QN AUTO: 33 PG (ref 26.1–32.9)
MCHC RBC AUTO-ENTMCNC: 34.7 G/DL (ref 31.4–35)
MCV RBC AUTO: 95.1 FL (ref 82–102)
MONOCYTES # BLD: 0.4 K/UL (ref 0.1–1.3)
MONOCYTES NFR BLD: 12 % (ref 4–12)
NEUTS SEG # BLD: 1.4 K/UL (ref 1.7–8.2)
NEUTS SEG NFR BLD: 39 % (ref 43–78)
NRBC # BLD: 0 K/UL (ref 0–0.2)
PLATELET # BLD AUTO: 80 K/UL (ref 150–450)
PLATELET COMMENT: ABNORMAL
PMV BLD AUTO: 10.4 FL (ref 9.4–12.3)
POTASSIUM SERPL-SCNC: 4.3 MMOL/L (ref 3.5–5.1)
RBC # BLD AUTO: 4.27 M/UL (ref 4.23–5.6)
RBC MORPH BLD: ABNORMAL
SERVICE CMNT-IMP: ABNORMAL
SODIUM SERPL-SCNC: 143 MMOL/L (ref 136–146)
WBC # BLD AUTO: 3.5 K/UL (ref 4.3–11.1)
WBC MORPH BLD: ABNORMAL

## 2024-03-06 PROCEDURE — 7100000010 HC PHASE II RECOVERY - FIRST 15 MIN: Performed by: STUDENT IN AN ORGANIZED HEALTH CARE EDUCATION/TRAINING PROGRAM

## 2024-03-06 PROCEDURE — 80048 BASIC METABOLIC PNL TOTAL CA: CPT

## 2024-03-06 PROCEDURE — 88305 TISSUE EXAM BY PATHOLOGIST: CPT

## 2024-03-06 PROCEDURE — 82962 GLUCOSE BLOOD TEST: CPT

## 2024-03-06 PROCEDURE — 6360000002 HC RX W HCPCS: Performed by: FAMILY MEDICINE

## 2024-03-06 PROCEDURE — 2580000003 HC RX 258: Performed by: ANESTHESIOLOGY

## 2024-03-06 PROCEDURE — 3700000000 HC ANESTHESIA ATTENDED CARE: Performed by: STUDENT IN AN ORGANIZED HEALTH CARE EDUCATION/TRAINING PROGRAM

## 2024-03-06 PROCEDURE — 0DB68ZX EXCISION OF STOMACH, VIA NATURAL OR ARTIFICIAL OPENING ENDOSCOPIC, DIAGNOSTIC: ICD-10-PCS | Performed by: STUDENT IN AN ORGANIZED HEALTH CARE EDUCATION/TRAINING PROGRAM

## 2024-03-06 PROCEDURE — 85018 HEMOGLOBIN: CPT

## 2024-03-06 PROCEDURE — 0DJ08ZZ INSPECTION OF UPPER INTESTINAL TRACT, VIA NATURAL OR ARTIFICIAL OPENING ENDOSCOPIC: ICD-10-PCS | Performed by: STUDENT IN AN ORGANIZED HEALTH CARE EDUCATION/TRAINING PROGRAM

## 2024-03-06 PROCEDURE — 1100000003 HC PRIVATE W/ TELEMETRY

## 2024-03-06 PROCEDURE — 85014 HEMATOCRIT: CPT

## 2024-03-06 PROCEDURE — 85025 COMPLETE CBC W/AUTO DIFF WBC: CPT

## 2024-03-06 PROCEDURE — C9113 INJ PANTOPRAZOLE SODIUM, VIA: HCPCS | Performed by: FAMILY MEDICINE

## 2024-03-06 PROCEDURE — 6360000002 HC RX W HCPCS

## 2024-03-06 PROCEDURE — 7100000011 HC PHASE II RECOVERY - ADDTL 15 MIN: Performed by: STUDENT IN AN ORGANIZED HEALTH CARE EDUCATION/TRAINING PROGRAM

## 2024-03-06 PROCEDURE — 2709999900 HC NON-CHARGEABLE SUPPLY: Performed by: STUDENT IN AN ORGANIZED HEALTH CARE EDUCATION/TRAINING PROGRAM

## 2024-03-06 PROCEDURE — 6370000000 HC RX 637 (ALT 250 FOR IP): Performed by: FAMILY MEDICINE

## 2024-03-06 PROCEDURE — A4216 STERILE WATER/SALINE, 10 ML: HCPCS | Performed by: FAMILY MEDICINE

## 2024-03-06 PROCEDURE — 36415 COLL VENOUS BLD VENIPUNCTURE: CPT

## 2024-03-06 PROCEDURE — 2500000003 HC RX 250 WO HCPCS

## 2024-03-06 PROCEDURE — 88312 SPECIAL STAINS GROUP 1: CPT

## 2024-03-06 PROCEDURE — 3609012400 HC EGD TRANSORAL BIOPSY SINGLE/MULTIPLE: Performed by: STUDENT IN AN ORGANIZED HEALTH CARE EDUCATION/TRAINING PROGRAM

## 2024-03-06 PROCEDURE — 43239 EGD BIOPSY SINGLE/MULTIPLE: CPT | Performed by: STUDENT IN AN ORGANIZED HEALTH CARE EDUCATION/TRAINING PROGRAM

## 2024-03-06 PROCEDURE — 2580000003 HC RX 258: Performed by: FAMILY MEDICINE

## 2024-03-06 RX ORDER — SODIUM CHLORIDE, SODIUM LACTATE, POTASSIUM CHLORIDE, CALCIUM CHLORIDE 600; 310; 30; 20 MG/100ML; MG/100ML; MG/100ML; MG/100ML
INJECTION, SOLUTION INTRAVENOUS CONTINUOUS
Status: DISCONTINUED | OUTPATIENT
Start: 2024-03-06 | End: 2024-03-06

## 2024-03-06 RX ORDER — SODIUM CHLORIDE 0.9 % (FLUSH) 0.9 %
5-40 SYRINGE (ML) INJECTION EVERY 12 HOURS SCHEDULED
Status: DISCONTINUED | OUTPATIENT
Start: 2024-03-06 | End: 2024-03-06 | Stop reason: HOSPADM

## 2024-03-06 RX ORDER — ONDANSETRON 2 MG/ML
INJECTION INTRAMUSCULAR; INTRAVENOUS PRN
Status: DISCONTINUED | OUTPATIENT
Start: 2024-03-06 | End: 2024-03-06 | Stop reason: SDUPTHER

## 2024-03-06 RX ORDER — LIDOCAINE HYDROCHLORIDE 10 MG/ML
1 INJECTION, SOLUTION INFILTRATION; PERINEURAL
Status: DISCONTINUED | OUTPATIENT
Start: 2024-03-06 | End: 2024-03-06 | Stop reason: HOSPADM

## 2024-03-06 RX ORDER — SODIUM CHLORIDE 9 MG/ML
INJECTION, SOLUTION INTRAVENOUS PRN
Status: DISCONTINUED | OUTPATIENT
Start: 2024-03-06 | End: 2024-03-06 | Stop reason: HOSPADM

## 2024-03-06 RX ORDER — SODIUM CHLORIDE 0.9 % (FLUSH) 0.9 %
5-40 SYRINGE (ML) INJECTION PRN
Status: DISCONTINUED | OUTPATIENT
Start: 2024-03-06 | End: 2024-03-06 | Stop reason: HOSPADM

## 2024-03-06 RX ORDER — GLYCOPYRROLATE 0.2 MG/ML
INJECTION INTRAMUSCULAR; INTRAVENOUS PRN
Status: DISCONTINUED | OUTPATIENT
Start: 2024-03-06 | End: 2024-03-06 | Stop reason: SDUPTHER

## 2024-03-06 RX ORDER — LIDOCAINE HYDROCHLORIDE 20 MG/ML
INJECTION, SOLUTION EPIDURAL; INFILTRATION; INTRACAUDAL; PERINEURAL PRN
Status: DISCONTINUED | OUTPATIENT
Start: 2024-03-06 | End: 2024-03-06 | Stop reason: SDUPTHER

## 2024-03-06 RX ORDER — PROPOFOL 10 MG/ML
INJECTION, EMULSION INTRAVENOUS PRN
Status: DISCONTINUED | OUTPATIENT
Start: 2024-03-06 | End: 2024-03-06 | Stop reason: SDUPTHER

## 2024-03-06 RX ADMIN — SODIUM CHLORIDE, POTASSIUM CHLORIDE, SODIUM LACTATE AND CALCIUM CHLORIDE: 600; 310; 30; 20 INJECTION, SOLUTION INTRAVENOUS at 13:11

## 2024-03-06 RX ADMIN — LEVOTHYROXINE SODIUM 125 MCG: 0.12 TABLET ORAL at 05:48

## 2024-03-06 RX ADMIN — SODIUM CHLORIDE, PRESERVATIVE FREE 10 ML: 5 INJECTION INTRAVENOUS at 21:37

## 2024-03-06 RX ADMIN — GLYCOPYRROLATE 0.2 MG: 0.2 INJECTION INTRAMUSCULAR; INTRAVENOUS at 14:34

## 2024-03-06 RX ADMIN — PROPOFOL 30 MG: 10 INJECTION, EMULSION INTRAVENOUS at 14:35

## 2024-03-06 RX ADMIN — LIDOCAINE HYDROCHLORIDE 100 MG: 20 INJECTION, SOLUTION EPIDURAL; INFILTRATION; INTRACAUDAL; PERINEURAL at 14:34

## 2024-03-06 RX ADMIN — GABAPENTIN 300 MG: 300 CAPSULE ORAL at 21:37

## 2024-03-06 RX ADMIN — ROPINIROLE HYDROCHLORIDE 1 MG: 1 TABLET, FILM COATED ORAL at 21:37

## 2024-03-06 RX ADMIN — SODIUM CHLORIDE, PRESERVATIVE FREE 40 MG: 5 INJECTION INTRAVENOUS at 21:37

## 2024-03-06 RX ADMIN — SODIUM CHLORIDE: 9 INJECTION, SOLUTION INTRAVENOUS at 11:04

## 2024-03-06 RX ADMIN — PRAVASTATIN SODIUM 40 MG: 20 TABLET ORAL at 21:37

## 2024-03-06 RX ADMIN — PROPOFOL 70 MG: 10 INJECTION, EMULSION INTRAVENOUS at 14:34

## 2024-03-06 RX ADMIN — ONDANSETRON 4 MG: 2 INJECTION INTRAMUSCULAR; INTRAVENOUS at 14:34

## 2024-03-06 RX ADMIN — SODIUM CHLORIDE, PRESERVATIVE FREE 40 MG: 5 INJECTION INTRAVENOUS at 10:20

## 2024-03-06 ASSESSMENT — PAIN - FUNCTIONAL ASSESSMENT
PAIN_FUNCTIONAL_ASSESSMENT: NONE - DENIES PAIN
PAIN_FUNCTIONAL_ASSESSMENT: NONE - DENIES PAIN

## 2024-03-06 ASSESSMENT — ENCOUNTER SYMPTOMS: SHORTNESS OF BREATH: 1

## 2024-03-06 NOTE — OP NOTE
Endoscopy Note          Operative Report    Patient: Dennis Santos MRN: [id number]      YOB: 1940  Age: 83 y.o.  Sex: male            Indications:  Melena    Preoperative Evaluation: The patient was evaluated prior to the procedure in the GI lab admission area, the patient ASA was recorded .  Consent was obtained from the patient with the risk of perforation bleeding and aspiration.    Anesthesia: AUGUSTO-per anesthesia  Complications: None  EBL: Unremarkable  Procedure: The patient was sedated in the left lateral decubitus position. Scope was advance from the mouth to the third portion of the duodenum. The scope was withdrawn to the stomach and a refroflexed view was performed.     Findings:  Trace esophageal varices.  Mild portal hypertensive gastropathy.  Multiple punctuate ulcers in the antrum, biopsies were obtained.  No active bleeding.  Mild duodenitis.    Postoperative Diagnosis:  Mild portal hypertensive gastropathy.  Gastric ulcers.    Recommendations:   Recommend p.o. PPI twice daily for 8 weeks followed by PPI daily indefinitely.  Resume regular diet.  GI will sign off at this time.  Call back with any questions.    Signed By:  Candice Francisco MD     March 6, 2024

## 2024-03-06 NOTE — PROGRESS NOTES
4 Eyes Skin Assessment     NAME:  Dennis Santos  YOB: 1940  MEDICAL RECORD NUMBER:  [id number]    The patient is being assessed for  Admission    I agree that at least one RN has performed a thorough Head to Toe Skin Assessment on the patient. ALL assessment sites listed below have been assessed.      Areas assessed by both nurses:    Head, Face, Ears, Shoulders, Back, Chest, Arms, Elbows, Hands, Sacrum. Buttock, Coccyx, Ischium, Legs. Feet and Heels, and Under Medical Devices         Does the Patient have a Wound? No noted wound(s)       Mike Prevention initiated by RN: No  Wound Care Orders initiated by RN: No    Pressure Injury (Stage 3,4, Unstageable, DTI, NWPT, and Complex wounds) if present, place Wound referral order by RN under : No    New Ostomies, if present place, Ostomy referral order under : No     Nurse 1 eSignature: Electronically signed by Daryl Brown RN on 3/6/24 at 4:17 AM EST    **SHARE this note so that the co-signing nurse can place an eSignature**    Nurse 2 eSignature: {Esignature:[id number]}

## 2024-03-06 NOTE — PROGRESS NOTES
air)    Estimated body mass index is 27.62 kg/m² as calculated from the following:    Height as of this encounter: 1.803 m (5' 11\").    Weight as of this encounter: 89.8 kg (198 lb).  No intake or output data in the 24 hours ending 03/06/24 0850      Physical Exam:     General:    Well nourished. Alert, no distress    CV:   Regular, slightly slow,.  No m/r/g.  No jugular venous distension., no edema   Lungs:   CTAB.  No wheezing, rhonchi, or rales.  Symmetric expansion.  Abdomen:   Soft, nontender, nondistended. Present BS  Extremities: No cyanosis or clubbing.  No edema  Skin:     No rashes.  Normal coloration.   Warm and dry.    Neuro:   grossly intact.    Psych:  Normal mood and affect.      I have personally reviewed labs and tests:  Recent Labs:  Recent Results (from the past 48 hour(s))   EKG 12 Lead    Collection Time: 03/05/24 11:14 AM   Result Value Ref Range    Ventricular Rate 58 BPM    Atrial Rate 58 BPM    P-R Interval 159 ms    QRS Duration 90 ms    Q-T Interval 440 ms    QTc Calculation (Bazett) 433 ms    P Axis 52 degrees    R Axis 64 degrees    T Axis 65 degrees    Diagnosis       Sinus rhythm    Confirmed by TED JUAN (), REN ROBERTS ([id number]) on 3/5/2024 2:44:12 PM     CMP    Collection Time: 03/05/24 11:33 AM   Result Value Ref Range    Sodium 140 136 - 146 mmol/L    Potassium 4.4 3.5 - 5.1 mmol/L    Chloride 115 (H) 103 - 113 mmol/L    CO2 24 21 - 32 mmol/L    Anion Gap 1 (L) 2 - 11 mmol/L    Glucose 178 (H) 65 - 100 mg/dL    BUN 33 (H) 8 - 23 MG/DL    Creatinine 1.00 0.8 - 1.5 MG/DL    Est, Glom Filt Rate >60 >60 ml/min/1.73m2    Calcium 8.1 (L) 8.3 - 10.4 MG/DL    Total Bilirubin 0.7 0.2 - 1.1 MG/DL    ALT 40 12 - 65 U/L    AST 34 15 - 37 U/L    Alk Phosphatase 89 50 - 136 U/L    Total Protein 5.2 (L) 6.3 - 8.2 g/dL    Albumin 2.6 (L) 3.2 - 4.6 g/dL    Globulin 2.6 (L) 2.8 - 4.5 g/dL    Albumin/Globulin Ratio 1.0 0.4 - 1.6     CBC with Auto Differential    Collection Time: 03/05/24 11:33 AM  Route Frequency    Vitamin D (CHOLECALCIFEROL) tablet 2,000 Units  2,000 Units Oral Daily    gabapentin (NEURONTIN) capsule 300 mg  300 mg Oral Nightly    levothyroxine (SYNTHROID) tablet 125 mcg  125 mcg Oral QAM AC    meclizine (ANTIVERT) tablet 25 mg  25 mg Oral TID PRN    [Held by provider] metFORMIN (GLUCOPHAGE-XR) extended release tablet 1,000 mg  1,000 mg Oral BID with meals    pravastatin (PRAVACHOL) tablet 40 mg  40 mg Oral Nightly    rOPINIRole (REQUIP) tablet 1 mg  1 mg Oral QHS    sodium chloride flush 0.9 % injection 5-40 mL  5-40 mL IntraVENous 2 times per day    sodium chloride flush 0.9 % injection 5-40 mL  5-40 mL IntraVENous PRN    0.9 % sodium chloride infusion   IntraVENous PRN    ondansetron (ZOFRAN-ODT) disintegrating tablet 4 mg  4 mg Oral Q8H PRN    Or    ondansetron (ZOFRAN) injection 4 mg  4 mg IntraVENous Q6H PRN    polyethylene glycol (GLYCOLAX) packet 17 g  17 g Oral Daily PRN    acetaminophen (TYLENOL) tablet 650 mg  650 mg Oral Q6H PRN    Or    acetaminophen (TYLENOL) suppository 650 mg  650 mg Rectal Q6H PRN    0.9 % sodium chloride infusion   IntraVENous Continuous    pantoprazole (PROTONIX) 40 mg in sodium chloride (PF) 0.9 % 10 mL injection  40 mg IntraVENous Q12H    insulin lispro (HUMALOG) injection vial 0-4 Units  0-4 Units SubCUTAneous TID WC    insulin lispro (HUMALOG) injection vial 0-4 Units  0-4 Units SubCUTAneous Nightly    [Held by provider] insulin glargine (LANTUS) injection vial 10 Units  10 Units SubCUTAneous Nightly    octreotide (SANDOSTATIN) 500 mcg in sodium chloride 0.9 % 100 mL infusion  25 mcg/hr IntraVENous Continuous       Signed:  Kaylee Gilliam MD    Part of this note may have been written by using a voice dictation software.  The note has been proof read but may still contain some grammatical/other typographical errors.

## 2024-03-06 NOTE — PROGRESS NOTES
TRANSFER - IN REPORT:    Verbal report received from JAI Jesus on Dennis Santos  being received from [id number] for ordered procedure      Report consisted of patient's Situation, Background, Assessment and   Recommendations(SBAR).     Information from the following report(s) Nurse Handoff Report, Adult Overview, Surgery Report, Intake/Output, MAR, Recent Results, and Cardiac Rhythm sinus kannan  was reviewed with the receiving nurse.    Opportunity for questions and clarification was provided.      Assessment completed upon patient's arrival to unit and care assumed.

## 2024-03-06 NOTE — ANESTHESIA POSTPROCEDURE EVALUATION
Department of Anesthesiology  Postprocedure Note    Patient: Dennis Santos  MRN: [id number]  YOB: 1940  Date of evaluation: 3/6/2024    Procedure Summary       Date: 03/06/24 Room / Location: [facility] ENDO 04 / [facility] ENDOSCOPY    Anesthesia Start: 1428 Anesthesia Stop: 1445    Procedure: ESOPHAGOGASTRODUODENOSCOPY (Upper GI Region) Diagnosis:       Melena      (Melena [K92.1])    Surgeons: Candice Francisco MD Responsible Provider: Imer Garcia MD    Anesthesia Type: TIVA ASA Status: 3            Anesthesia Type: No value filed.    Cordelia Phase I: Cordelia Score: 10    Cordelia Phase II: Cordelia Score: 10    Anesthesia Post Evaluation    Patient location during evaluation: PACU  Patient participation: complete - patient participated  Level of consciousness: awake and alert  Airway patency: patent  Nausea & Vomiting: no nausea and no vomiting  Cardiovascular status: hemodynamically stable  Respiratory status: acceptable, nonlabored ventilation and spontaneous ventilation  Hydration status: euvolemic  Comments: BP (!) 173/84   Pulse 59   Temp 97.3 °F (36.3 °C)   Resp 18   Ht 1.803 m (5' 11\")   Wt 89.8 kg (198 lb)   SpO2 99%   BMI 27.62 kg/m²     Multimodal analgesia pain management approach  Pain management: adequate and satisfactory to patient    No notable events documented.

## 2024-03-06 NOTE — PROGRESS NOTES
4 Eyes Skin Assessment     NAME:  Dennis Santos  YOB: 1940  MEDICAL RECORD NUMBER:  [id number]    The patient is being assessed for  Admission    I agree that at least one RN has performed a thorough Head to Toe Skin Assessment on the patient. ALL assessment sites listed below have been assessed.      Areas assessed by both nurses:    Head, Face, Ears, Shoulders, Back, Chest, Arms, Elbows, Hands, Sacrum. Buttock, Coccyx, Ischium, Legs. Feet and Heels, and Under Medical Devices         Does the Patient have a Wound? No noted wound(s)       Mike Prevention initiated by RN: No  Wound Care Orders initiated by RN: No    Pressure Injury (Stage 3,4, Unstageable, DTI, NWPT, and Complex wounds) if present, place Wound referral order by RN under : No    New Ostomies, if present place, Ostomy referral order under : No     Nurse 1 eSignature: Electronically signed by Daryl Brown RN on 3/6/24 at 4:17 AM EST    **SHARE this note so that the co-signing nurse can place an eSignature**    Nurse 2 eSignature: Electronically signed by Festus Mays RN on 3/6/24 at 4:18 AM EST

## 2024-03-06 NOTE — CARE COORDINATION
03/06/24 1440   Service Assessment   Patient Orientation Unable to Assess   Cognition Other (see comment)  (unable to assess)   History Provided By Medical Record   Primary Caregiver Self   Support Systems Spouse/Significant Other   Patient's Healthcare Decision Maker is: Legal Next of Kin   PCP Verified by CM No   Prior Functional Level Independent in ADLs/IADLs   Current Functional Level Independent in ADLs/IADLs   Can patient return to prior living arrangement Yes   Ability to make needs known: Other (see comment)  (unable to assess)   Family able to assist with home care needs: No   Would you like for me to discuss the discharge plan with any other family members/significant others, and if so, who? No   Financial Resources Medicare   Community Resources None   Condition of Participation: Discharge Planning   The Plan for Transition of Care is related to the following treatment goals: return home   The Patient and/or Patient Representative was provided with a Choice of Provider? Patient   The Patient and/Or Patient Representative agree with the Discharge Plan? Yes   Freedom of Choice list was provided with basic dialogue that supports the patient's individualized plan of care/goals, treatment preferences, and shares the quality data associated with the providers?  Yes     Assessment completed via chart review. Patient currently off the floor. Patient lives with spouse and is IND at baseline. Discharge plan is to return hoe with spouse. No anticipated discharge needs.    Berhane PUENTES, ACM  [place]

## 2024-03-06 NOTE — ANESTHESIA PRE PROCEDURE
500 mcg in sodium chloride 0.9 % 100 mL infusion  25 mcg/hr IntraVENous Continuous Syed Orourke PA 5 mL/hr at 03/05/24 1844 25 mcg/hr at 03/05/24 1844       Allergies:  No Known Allergies    Problem List:    Patient Active Problem List   Diagnosis Code   • Obesity (BMI 30.0-34.9) E66.9   • Blood clotting disorder ([facility]) D68.9   • Arteriosclerosis of carotid artery I65.29   • Malaise and fatigue R53.81, R53.83   • Dizziness and giddiness R42   • Joint derangement M24.9   • Hyperglyceridemia, pure E78.1   • Muscle spasm of back M62.830   • MERT on CPAP G47.33   • Onychomycosis B35.1   • Nontoxic uninodular goiter E04.1   • Anemia D64.9   • GERD (gastroesophageal reflux disease) K21.9   • Hypercholesterolemia E78.00   • Lumbar stenosis with neurogenic claudication M48.062   • Peripheral neuropathy G62.9   • Hematoma, subungual, great toe, left S90.212A   • Diabetic neuropathy, painful ([facility]) E11.40   • Pain in limb M79.609   • Abnormal clinical finding R68.89   • Acquired hypothyroidism E03.9   • Hypersomnia G47.10   • Ankle fracture, right S82.891A   • Nocturia R35.1   • Disorder of fluid or electrolyte E87.8   • Mononeuritis of lower extremity G57.90   • Hyperlipidemia due to type 2 diabetes mellitus ([facility]) E11.69, E78.5   • RLS (restless legs syndrome) G25.81   • Dyslipidemia E78.5   • Precordial pain R07.2   • Leukemia ([facility]) C95.90   • BPH (benign prostatic hyperplasia) N40.0   • PLMD (periodic limb movement disorder) G47.61   • DM (diabetes mellitus), type 2, uncontrolled [id number]   • Type 2 diabetes mellitus, with long-term current use of insulin ([facility]) E11.9, Z79.4   • Polyarthritis M13.0   • Arthritis M19.90   • Hypertension, essential, benign I10   • Hip tendinitis M76.899   • Radiculopathy, lumbosacral region M54.17   • GI bleed K92.2   • Bradycardia R00.1   • Hypotension I95.9   • Melena K92.1   • Thrombocytopenia (HCC) D69.6   • Cirrhosis of liver with ascites (HCC) K74.60, R18.8       Past Medical

## 2024-03-07 ENCOUNTER — APPOINTMENT (OUTPATIENT)
Dept: NON INVASIVE DIAGNOSTICS | Age: 84
End: 2024-03-07
Attending: INTERNAL MEDICINE
Payer: MEDICARE

## 2024-03-07 LAB
ANION GAP SERPL CALC-SCNC: 5 MMOL/L (ref 2–11)
BASOPHILS # BLD: 0 K/UL (ref 0–0.2)
BASOPHILS NFR BLD: 1 % (ref 0–2)
BUN SERPL-MCNC: 17 MG/DL (ref 8–23)
CALCIUM SERPL-MCNC: 9.1 MG/DL (ref 8.3–10.4)
CHLORIDE SERPL-SCNC: 107 MMOL/L (ref 103–113)
CO2 SERPL-SCNC: 28 MMOL/L (ref 21–32)
CREAT SERPL-MCNC: 1.4 MG/DL (ref 0.8–1.5)
DIFFERENTIAL METHOD BLD: NORMAL
ECHO AO ASC DIAM: 3.4 CM
ECHO AO ASCENDING AORTA INDEX: 1.62 CM/M2
ECHO AO ROOT DIAM: 3.9 CM
ECHO AO ROOT INDEX: 1.86 CM/M2
ECHO AV AREA PEAK VELOCITY: 2.3 CM2
ECHO AV AREA VTI: 2.8 CM2
ECHO AV AREA/BSA PEAK VELOCITY: 1.1 CM2/M2
ECHO AV AREA/BSA VTI: 1.3 CM2/M2
ECHO AV MEAN GRADIENT: 5 MMHG
ECHO AV MEAN VELOCITY: 1 M/S
ECHO AV PEAK GRADIENT: 9 MMHG
ECHO AV PEAK VELOCITY: 1.5 M/S
ECHO AV VELOCITY RATIO: 0.67
ECHO AV VTI: 28.8 CM
ECHO BSA: 2.12 M2
ECHO IVC PROX: 1.8 CM
ECHO LA AREA 2C: 16.3 CM2
ECHO LA AREA 4C: 17.3 CM2
ECHO LA DIAMETER INDEX: 1.38 CM/M2
ECHO LA DIAMETER: 2.9 CM
ECHO LA MAJOR AXIS: 6.2 CM
ECHO LA MINOR AXIS: 5.5 CM
ECHO LA TO AORTIC ROOT RATIO: 0.74
ECHO LA VOL BP: 40 ML (ref 18–58)
ECHO LA VOL MOD A2C: 39 ML (ref 18–58)
ECHO LA VOL MOD A4C: 36 ML (ref 18–58)
ECHO LA VOL/BSA BIPLANE: 19 ML/M2 (ref 16–34)
ECHO LA VOLUME INDEX MOD A2C: 19 ML/M2 (ref 16–34)
ECHO LA VOLUME INDEX MOD A4C: 17 ML/M2 (ref 16–34)
ECHO LV E' LATERAL VELOCITY: 9 CM/S
ECHO LV E' SEPTAL VELOCITY: 6 CM/S
ECHO LV EDV A2C: 96 ML
ECHO LV EDV A4C: 103 ML
ECHO LV EDV INDEX A4C: 49 ML/M2
ECHO LV EDV NDEX A2C: 46 ML/M2
ECHO LV EJECTION FRACTION A2C: 59 %
ECHO LV EJECTION FRACTION A4C: 61 %
ECHO LV EJECTION FRACTION BIPLANE: 61 % (ref 55–100)
ECHO LV ESV A2C: 39 ML
ECHO LV ESV A4C: 40 ML
ECHO LV ESV INDEX A2C: 19 ML/M2
ECHO LV ESV INDEX A4C: 19 ML/M2
ECHO LV FRACTIONAL SHORTENING: 32 % (ref 28–44)
ECHO LV INTERNAL DIMENSION DIASTOLE INDEX: 2.38 CM/M2
ECHO LV INTERNAL DIMENSION DIASTOLIC: 5 CM (ref 4.2–5.9)
ECHO LV INTERNAL DIMENSION SYSTOLIC INDEX: 1.62 CM/M2
ECHO LV INTERNAL DIMENSION SYSTOLIC: 3.4 CM
ECHO LV IVSD: 1.2 CM (ref 0.6–1)
ECHO LV MASS 2D: 207.1 G (ref 88–224)
ECHO LV MASS INDEX 2D: 98.6 G/M2 (ref 49–115)
ECHO LV POSTERIOR WALL DIASTOLIC: 1 CM (ref 0.6–1)
ECHO LV RELATIVE WALL THICKNESS RATIO: 0.4
ECHO LVOT AREA: 3.5 CM2
ECHO LVOT AV VTI INDEX: 0.81
ECHO LVOT DIAM: 2.1 CM
ECHO LVOT MEAN GRADIENT: 2 MMHG
ECHO LVOT MEAN GRADIENT: 2 MMHG
ECHO LVOT PEAK GRADIENT: 4 MMHG
ECHO LVOT PEAK VELOCITY: 1 M/S
ECHO LVOT STROKE VOLUME INDEX: 38.4 ML/M2
ECHO LVOT SV: 80.7 ML
ECHO LVOT VTI: 23.3 CM
ECHO MV A VELOCITY: 0.76 M/S
ECHO MV AREA VTI: 3.5 CM2
ECHO MV E DECELERATION TIME (DT): 417 MS
ECHO MV E VELOCITY: 0.5 M/S
ECHO MV E/A RATIO: 0.66
ECHO MV E/E' LATERAL: 5.56
ECHO MV E/E' RATIO (AVERAGED): 6.94
ECHO MV LVOT VTI INDEX: 0.98
ECHO MV MAX VELOCITY: 0.8 M/S
ECHO MV MEAN GRADIENT: 1 MMHG
ECHO MV MEAN VELOCITY: 0.4 M/S
ECHO MV PEAK GRADIENT: 3 MMHG
ECHO MV VTI: 22.8 CM
ECHO PV ACCELERATION TIME (AT): 144 MS
ECHO PV MAX VELOCITY: 1.2 M/S
ECHO PV PEAK GRADIENT: 6 MMHG
ECHO RV FREE WALL PEAK S': 12 CM/S
ECHO RV INTERNAL DIMENSION: 3 CM
ECHO RV TAPSE: 2.1 CM (ref 1.7–?)
EOSINOPHIL # BLD: 0.3 K/UL (ref 0–0.8)
EOSINOPHIL NFR BLD: 6 % (ref 0.5–7.8)
ERYTHROCYTE [DISTWIDTH] IN BLOOD BY AUTOMATED COUNT: 12.6 % (ref 11.9–14.6)
EST. AVERAGE GLUCOSE BLD GHB EST-MCNC: 194 MG/DL
GLUCOSE BLD STRIP.AUTO-MCNC: 231 MG/DL (ref 65–100)
GLUCOSE BLD STRIP.AUTO-MCNC: 282 MG/DL (ref 65–100)
GLUCOSE BLD STRIP.AUTO-MCNC: 306 MG/DL (ref 65–100)
GLUCOSE BLD STRIP.AUTO-MCNC: 329 MG/DL (ref 65–100)
GLUCOSE SERPL-MCNC: 213 MG/DL (ref 65–100)
HBA1C MFR BLD: 8.4 % (ref 4.8–5.6)
HCT VFR BLD AUTO: 47 % (ref 41.1–50.3)
HGB BLD-MCNC: 16.4 G/DL (ref 13.6–17.2)
IMM GRANULOCYTES # BLD AUTO: 0 K/UL (ref 0–0.5)
IMM GRANULOCYTES NFR BLD AUTO: 0 % (ref 0–5)
LYMPHOCYTES # BLD: 1.9 K/UL (ref 0.5–4.6)
LYMPHOCYTES NFR BLD: 35 % (ref 13–44)
MCH RBC QN AUTO: 32.5 PG (ref 26.1–32.9)
MCHC RBC AUTO-ENTMCNC: 34.9 G/DL (ref 31.4–35)
MCV RBC AUTO: 93.1 FL (ref 82–102)
MONOCYTES # BLD: 0.3 K/UL (ref 0.1–1.3)
MONOCYTES NFR BLD: 6 % (ref 4–12)
NEUTS SEG # BLD: 2.8 K/UL (ref 1.7–8.2)
NEUTS SEG NFR BLD: 52 % (ref 43–78)
NRBC # BLD: 0 K/UL (ref 0–0.2)
PLATELET # BLD AUTO: 153 K/UL (ref 150–450)
PMV BLD AUTO: 11.4 FL (ref 9.4–12.3)
POTASSIUM SERPL-SCNC: 3.7 MMOL/L (ref 3.5–5.1)
RBC # BLD AUTO: 5.05 M/UL (ref 4.23–5.6)
SERVICE CMNT-IMP: ABNORMAL
SODIUM SERPL-SCNC: 140 MMOL/L (ref 136–146)
WBC # BLD AUTO: 5.5 K/UL (ref 4.3–11.1)

## 2024-03-07 PROCEDURE — 6370000000 HC RX 637 (ALT 250 FOR IP): Performed by: INTERNAL MEDICINE

## 2024-03-07 PROCEDURE — 85025 COMPLETE CBC W/AUTO DIFF WBC: CPT

## 2024-03-07 PROCEDURE — A4216 STERILE WATER/SALINE, 10 ML: HCPCS | Performed by: INTERNAL MEDICINE

## 2024-03-07 PROCEDURE — C9113 INJ PANTOPRAZOLE SODIUM, VIA: HCPCS | Performed by: FAMILY MEDICINE

## 2024-03-07 PROCEDURE — 82962 GLUCOSE BLOOD TEST: CPT

## 2024-03-07 PROCEDURE — A4216 STERILE WATER/SALINE, 10 ML: HCPCS | Performed by: FAMILY MEDICINE

## 2024-03-07 PROCEDURE — 99222 1ST HOSP IP/OBS MODERATE 55: CPT | Performed by: INTERNAL MEDICINE

## 2024-03-07 PROCEDURE — C8929 TTE W OR WO FOL WCON,DOPPLER: HCPCS

## 2024-03-07 PROCEDURE — 6360000004 HC RX CONTRAST MEDICATION: Performed by: INTERNAL MEDICINE

## 2024-03-07 PROCEDURE — 6370000000 HC RX 637 (ALT 250 FOR IP): Performed by: FAMILY MEDICINE

## 2024-03-07 PROCEDURE — 6360000002 HC RX W HCPCS: Performed by: FAMILY MEDICINE

## 2024-03-07 PROCEDURE — 80048 BASIC METABOLIC PNL TOTAL CA: CPT

## 2024-03-07 PROCEDURE — 83036 HEMOGLOBIN GLYCOSYLATED A1C: CPT

## 2024-03-07 PROCEDURE — 2580000003 HC RX 258: Performed by: INTERNAL MEDICINE

## 2024-03-07 PROCEDURE — 2580000003 HC RX 258: Performed by: FAMILY MEDICINE

## 2024-03-07 PROCEDURE — 93306 TTE W/DOPPLER COMPLETE: CPT | Performed by: INTERNAL MEDICINE

## 2024-03-07 PROCEDURE — 36415 COLL VENOUS BLD VENIPUNCTURE: CPT

## 2024-03-07 PROCEDURE — 1100000000 HC RM PRIVATE

## 2024-03-07 RX ORDER — PANTOPRAZOLE SODIUM 40 MG/1
40 TABLET, DELAYED RELEASE ORAL
Status: DISCONTINUED | OUTPATIENT
Start: 2024-03-07 | End: 2024-03-08 | Stop reason: HOSPADM

## 2024-03-07 RX ADMIN — INSULIN LISPRO 1 UNITS: 100 INJECTION, SOLUTION INTRAVENOUS; SUBCUTANEOUS at 08:47

## 2024-03-07 RX ADMIN — LEVOTHYROXINE SODIUM 125 MCG: 0.12 TABLET ORAL at 05:20

## 2024-03-07 RX ADMIN — SODIUM CHLORIDE, PRESERVATIVE FREE 10 ML: 5 INJECTION INTRAVENOUS at 21:30

## 2024-03-07 RX ADMIN — SODIUM CHLORIDE, PRESERVATIVE FREE 10 ML: 5 INJECTION INTRAVENOUS at 08:49

## 2024-03-07 RX ADMIN — PRAVASTATIN SODIUM 40 MG: 20 TABLET ORAL at 21:29

## 2024-03-07 RX ADMIN — INSULIN LISPRO 3 UNITS: 100 INJECTION, SOLUTION INTRAVENOUS; SUBCUTANEOUS at 17:02

## 2024-03-07 RX ADMIN — INSULIN LISPRO 4 UNITS: 100 INJECTION, SOLUTION INTRAVENOUS; SUBCUTANEOUS at 21:29

## 2024-03-07 RX ADMIN — INSULIN LISPRO 3 UNITS: 100 INJECTION, SOLUTION INTRAVENOUS; SUBCUTANEOUS at 12:32

## 2024-03-07 RX ADMIN — PANTOPRAZOLE SODIUM 40 MG: 40 TABLET, DELAYED RELEASE ORAL at 17:04

## 2024-03-07 RX ADMIN — PERFLUTREN 0.45 ML: 6.52 INJECTION, SUSPENSION INTRAVENOUS at 13:40

## 2024-03-07 RX ADMIN — VITAMIN D, TAB 1000IU (100/BT) 2000 UNITS: 25 TAB at 08:48

## 2024-03-07 RX ADMIN — SODIUM CHLORIDE, PRESERVATIVE FREE 40 MG: 5 INJECTION INTRAVENOUS at 08:48

## 2024-03-07 RX ADMIN — INSULIN GLARGINE 10 UNITS: 100 INJECTION, SOLUTION SUBCUTANEOUS at 21:28

## 2024-03-07 RX ADMIN — ROPINIROLE HYDROCHLORIDE 1 MG: 1 TABLET, FILM COATED ORAL at 21:29

## 2024-03-07 RX ADMIN — GABAPENTIN 300 MG: 300 CAPSULE ORAL at 21:29

## 2024-03-07 ASSESSMENT — PAIN SCALES - GENERAL: PAINLEVEL_OUTOF10: 0

## 2024-03-07 NOTE — PROGRESS NOTES
Hospitalist Progress Note   Admit Date:  3/5/2024 11:08 AM   Name:  Dennis Santos   Age:  83 y.o.  Sex:  male  :  1940   MRN:  [id number]   Room:  [facility]/    Presenting/Chief Complaint: Rectal Bleeding     Reason(s) for Admission: Melena [K92.1]  GI bleed [K92.2]  Near syncope [R55]  Melanotic stools [K92.1]  Cirrhosis of liver with ascites, unspecified hepatic cirrhosis type (HCC) [K74.60, R18.8]     Hospital Course:       Dennis Santos is a 83 y.o. male with medical history of SUMMERS, DM2, neuropathy, RLS, MERT not on CPAP, hypothyroidism admitted with GI bleed associated with acute hypotension/ bradycardia.    S/p atropine with EMS and hydration     BP improved in ED  HR still low- EKG sinus bradycardia     CTAP shows cirrhosis and possible splenic varices     He has been seen by GI  s/p 3-6-24 EGD showing trace esophageal varices, portal hypertensive gastropathy, antral ulcer with pending path, duodenitis     Recommends BID protonix for 8 weeks, then decrease to daily     Also on sandostatin drip that has been stopped      Discharge plans pending to home   Lives with wife         Subjective & 24hr Events:     Eating chicken  No palpitations  Walks 6 miles daily with walker   No bleeding      Assessment & Plan:     Principal Problem:    GI bleed  Plan:   Acute blood loss anemia  Splenic varices:  3-7-24  Diet resumed   GI signed off   Trending HGB , 16.4  Stopped  sandostatin drip 3-6   protonix every 12 hours            Type 2 diabetes mellitus, with long-term current use of insulin (HCC)  Plan  3-7-24:   Resume lantus   SSI           Hypertension, essential, benign  Plan:   3-7-24  Hypotension resolved               Bradycardia  Plan:   3-7-24  Currently monitoring on remote tele   Sinus bradycardia noted overnight  Will get cardiology input due to need for atropine  Check ECHO            Thrombocytopenia (HCC)  Plan:   Pancytopenia:  3-7-24   Likely cirrhosis related  Trend daily CBC-  K/uL    Neutrophils % 39 (L) 43 - 78 %    Lymphocytes % 37 13 - 44 %    Monocytes % 12 4.0 - 12.0 %    Eosinophils % 10 (H) 0.5 - 7.8 %    Basophils % 1 0.0 - 2.0 %    Immature Granulocytes 1 0.0 - 5.0 %    Neutrophils Absolute 1.4 (L) 1.7 - 8.2 K/UL    Lymphocytes Absolute 1.3 0.5 - 4.6 K/UL    Monocytes Absolute 0.4 0.1 - 1.3 K/UL    Eosinophils Absolute 0.4 0.0 - 0.8 K/UL    Basophils Absolute 0.0 0.0 - 0.2 K/UL    Absolute Immature Granulocyte 0.0 0.0 - 0.5 K/UL    RBC Comment SLIGHT  ANISOCYTOSIS + POIKILOCYTOSIS        WBC Comment Result Confirmed By Smear      Platelet Comment DECREASED      Differential Type AUTOMATED     POCT Glucose    Collection Time: 03/06/24  7:03 AM   Result Value Ref Range    POC Glucose 129 (H) 65 - 100 mg/dL    Performed by: MontseindSarina    POCT Glucose    Collection Time: 03/06/24 11:12 AM   Result Value Ref Range    POC Glucose 123 (H) 65 - 100 mg/dL    Performed by: Lenny    POCT Glucose    Collection Time: 03/06/24  4:36 PM   Result Value Ref Range    POC Glucose 117 (H) 65 - 100 mg/dL    Performed by: MontseindSarina    POCT Glucose    Collection Time: 03/06/24  7:42 PM   Result Value Ref Range    POC Glucose 207 (H) 65 - 100 mg/dL    Performed by: Angelica    Hemoglobin A1C    Collection Time: 03/07/24  6:27 AM   Result Value Ref Range    Hemoglobin A1C 8.4 (H) 4.8 - 5.6 %    Estimated Avg Glucose 194 mg/dL   Basic Metabolic Panel    Collection Time: 03/07/24  6:27 AM   Result Value Ref Range    Sodium 140 136 - 146 mmol/L    Potassium 3.7 3.5 - 5.1 mmol/L    Chloride 107 103 - 113 mmol/L    CO2 28 21 - 32 mmol/L    Anion Gap 5 2 - 11 mmol/L    Glucose 213 (H) 65 - 100 mg/dL    BUN 17 8 - 23 MG/DL    Creatinine 1.40 0.8 - 1.5 MG/DL    Est, Glom Filt Rate 50 (L) >60 ml/min/1.73m2    Calcium 9.1 8.3 - 10.4 MG/DL   CBC with Auto Differential    Collection Time: 03/07/24  6:27 AM   Result Value Ref Range    WBC 5.5 4.3 - 11.1 K/uL    RBC 5.05 4.23 - 5.6 M/uL

## 2024-03-07 NOTE — ADT AUTH CERT
UPDATED PROGRESS NOTES 24  Notes  Patient: Dennis Santos \"Pop\" MRN: [id number]     Note Information    Author Author Type Note Type New Service Filed     Kaylee Gilliam MD Physician Progress Notes [facility] 24 1351       Note Text    Expand All Collapse All           Hospitalist Progress Note   Admit Date:     3/5/2024 11:08 AM   Name:              Dennis Santos   Age:                 83 y.o.  Sex:                 male  :               1940   MRN:               [id number]   Room:              [facility]     Presenting/Chief Complaint: Rectal Bleeding     Reason(s) for Admission: Melena [K92.1]  GI bleed [K92.2]  Near syncope [R55]  Melanotic stools [K92.1]  Cirrhosis of liver with ascites, unspecified hepatic cirrhosis type (HCC) [K74.60, R18.8]      Hospital Course:         Dennis Santos is a 83 y.o. male with medical history of SUMMERS, DM2, neuropathy, RLS, MERT not on CPAP, hypothyroidism admitted with GI bleed associated with acute hypotension/ bradycardia.     S/p atropine with EMS and hydration      BP improved in ED  HR still low- EKG sinus bradycardia      CTAP shows cirrhosis and possible splenic varices      He has been seen by GI  s/p 3 EGD showing trace esophageal varices, portal hypertensive gastropathy, antral ulcer with pending path, duodenitis      Recommends BID protonix for 8 weeks, then decrease to daily      Also on sandostatin drip that has been stopped        Discharge plans pending to home   Lives with wife            Subjective & 24hr Events:      Eating chicken  No palpitations  Walks 6 miles daily with walker   No bleeding        Assessment & Plan:      Principal Problem:    GI bleed  Plan:   Acute blood loss anemia  Splenic varices:  3-7-24  Diet resumed   GI signed off   Trending HGB , 16.4  Stopped  sandostatin drip 3-6   protonix every 12 hours                Type 2 diabetes mellitus, with long-term current use of insulin  (HCC)  Plan  3-7-24:   Resume lantus   SSI              Hypertension, essential, benign  Plan:   3-7-24  Hypotension resolved                    Bradycardia  Plan:   3-7-24  Currently monitoring on remote tele   Sinus bradycardia noted overnight  Will get cardiology input due to need for atropine  Check ECHO                Thrombocytopenia (HCC)  Plan:   Pancytopenia:  3-7-24   Likely cirrhosis related  Trend daily CBC- recheck resolved              Cirrhosis of liver with ascites (HCC)  Plan:   Stable  Followup GI outpatient                  Anticipated Discharge Arrangements:   Home     PT/OT evals and PPD ordered?  Therapy   Diet:  ADULT DIET; Regular  VTE prophylaxis: SCD's   Code status: Full Code        Non-peripheral Lines and Tubes (if present):      Telemetry (if present):  Cardiac/Telemetry Monitor On: Portable telemetry pack applied          Hospital Problems:  Principal Problem:    GI bleed  Active Problems:    GERD (gastroesophageal reflux disease)    Peripheral neuropathy    Acquired hypothyroidism    RLS (restless legs syndrome)    PLMD (periodic limb movement disorder)    Type 2 diabetes mellitus, with long-term current use of insulin (HCC)    Hypertension, essential, benign    Bradycardia    Hypotension    Melena    Thrombocytopenia (HCC)    Cirrhosis of liver with ascites (HCC)  Resolved Problems:    * No resolved hospital problems. *        Objective:   Patient Vitals for the past 24 hrs:    Temp Pulse Resp BP SpO2   03/07/24 0710 97.3 °F (36.3 °C) 54 18 117/74 99 %   03/07/24 0445 97.3 °F (36.3 °C) (!) 45 18 (!) 165/86 92 %   03/06/24 2254 -- (!) 47 -- -- --   03/06/24 1929 97.8 °F (36.6 °C) 50 19 (!) 166/75 --   03/06/24 1549 97.3 °F (36.3 °C) 59 18 (!) 173/84 99 %   03/06/24 1522 -- 56 -- (!) 159/97 (!) 82 %   03/06/24 1513 -- 53 -- (!) 138/59 99 %   03/06/24 1444 98.2 °F (36.8 °C) 55 12 (!) 144/67 100 %   03/06/24 1431 -- 52 20 (!) 179/84 99 %         Oxygen Therapy  SpO2: 99 %  Pulse via  4.8 - 5.6 %     Estimated Avg Glucose 194 mg/dL   Basic Metabolic Panel     Collection Time: 03/07/24  6:27 AM   Result Value Ref Range     Sodium 140 136 - 146 mmol/L     Potassium 3.7 3.5 - 5.1 mmol/L     Chloride 107 103 - 113 mmol/L     CO2 28 21 - 32 mmol/L     Anion Gap 5 2 - 11 mmol/L     Glucose 213 (H) 65 - 100 mg/dL     BUN 17 8 - 23 MG/DL     Creatinine 1.40 0.8 - 1.5 MG/DL     Est, Glom Filt Rate 50 (L) >60 ml/min/1.73m2     Calcium 9.1 8.3 - 10.4 MG/DL   CBC with Auto Differential     Collection Time: 03/07/24  6:27 AM   Result Value Ref Range     WBC 5.5 4.3 - 11.1 K/uL     RBC 5.05 4.23 - 5.6 M/uL     Hemoglobin 16.4 13.6 - 17.2 g/dL     Hematocrit 47.0 41.1 - 50.3 %     MCV 93.1 82 - 102 FL     MCH 32.5 26.1 - 32.9 PG     MCHC 34.9 31.4 - 35.0 g/dL     RDW 12.6 11.9 - 14.6 %     Platelets 153 150 - 450 K/uL     MPV 11.4 9.4 - 12.3 FL     nRBC 0.00 0.0 - 0.2 K/uL     Differential Type AUTOMATED       Neutrophils % 52 43 - 78 %     Lymphocytes % 35 13 - 44 %     Monocytes % 6 4.0 - 12.0 %     Eosinophils % 6 0.5 - 7.8 %     Basophils % 1 0.0 - 2.0 %     Immature Granulocytes 0 0.0 - 5.0 %     Neutrophils Absolute 2.8 1.7 - 8.2 K/UL     Lymphocytes Absolute 1.9 0.5 - 4.6 K/UL     Monocytes Absolute 0.3 0.1 - 1.3 K/UL     Eosinophils Absolute 0.3 0.0 - 0.8 K/UL     Basophils Absolute 0.0 0.0 - 0.2 K/UL     Absolute Immature Granulocyte 0.0 0.0 - 0.5 K/UL   POCT Glucose     Collection Time: 03/07/24  7:07 AM   Result Value Ref Range     POC Glucose 231 (H) 65 - 100 mg/dL     Performed by: Lenny     POCT Glucose     Collection Time: 03/07/24 11:02 AM   Result Value Ref Range     POC Glucose 329 (H) 65 - 100 mg/dL     Performed by: Lenny              No results for input(s): \"COVID19\" in the last 72 hours.     Current Meds:         Current Facility-Administered Medications   Medication Dose Route Frequency    Vitamin D (CHOLECALCIFEROL) tablet 2,000 Units  2,000 Units Oral Daily    gabapentin

## 2024-03-07 NOTE — CONSULTS
[facility] Cardiology Initial Cardiac Evaluation      Date of  Admission: 3/5/2024 11:08 AM     Primary Care Physician: Adriana Vogt MD  Primary Cardiologist: Dr. Hogue  Referring Physician: Dr. Gilliam  Supervising Physician: Dr. Hogue    CC/Reason for evaluation: sinus bradycardia    HPI:  Dennis Santos is a 83 y.o. male with prior medical history of HTN, T2DM, nonalcoholic cirrhosis, HLD, Hypothyroidism, MERT not on CPAP. The pt presented to [facility] on 3/5 w/ several days of bloody stool after being seen at his PCP office where he was found to be pale, diaphoretic and EMS was called. On EMS arrival pt bradycardic in 50s, BP 80/40, . He was given 1mg IV Atropine x 2 and 1L NS.     Workup in the ED revealed: Cr 1.0, K 4.4, Hgb 12.6, Plt 76, CT abd/pelvis w/ cirrhotic liver, varices. EKG in NSR. Given GIB the pt was admitted, started on IVF, octreotide, IV PPI and GI consulted. The pt underwent EGD on 3/6 which revealed multiple ulcers, no active bleeding, mild portal hypertensive gastropathy. Recommendations made and GI signed off. The pt was monitored on telemetry, HR in the 40s at times overnight in sinus kannan. Given this Cardiology consulted.     The pt tells me that he currently feels great. Has episodes of vertigo for the last 10 years or so, last episode of dizziness was on the day of arrival while walking into PCPs office, felt dizzy and then after a few minutes this improved, no episodes of dizziness/lightheadedness while in hospital or ambulating here. No CP, chest tightness, SOB, pleuritic CP and tells me he feels at his baseline. Uses a walker to ambulate, occasionally has mechanical falls from tripping on things. Not on rate controlling medications and denies being on antihypertensives, these were recently dc'd.      Recent Cardiac Synopsis:  7/2018 NST:   EF 63%  CONCLUSION:   1. Stress EKG: Normal.   2. SPECT Perfusion Imaging: Normal Perfusion.  3. LV Systolic Function is  normal.  11/29/2017    COLONOSCOPY / BMI=27 performed by Julio Lemus MD at [facility] ENDOSCOPY    COLONOSCOPY N/A 6/10/2022    COLONOSCOPY POLYPECTOMY SNARE/COLD BIOPSY performed by Julio Wilson MD at [facility] ENDOSCOPY    HEENT      right ear surgery-to improve hearing    OTHER SURGICAL HISTORY      colonscopy     SKIN BIOPSY      TOTAL KNEE ARTHROPLASTY  2002    right and left    UPPER GASTROINTESTINAL ENDOSCOPY N/A 6/10/2022    EGD BIOPSY with dilation performed by Julio Wilson MD at [facility] ENDOSCOPY    UPPER GASTROINTESTINAL ENDOSCOPY N/A 3/6/2024    ESOPHAGOGASTRODUODENOSCOPY BIOPSY performed by Candice Francisco MD at [facility] ENDOSCOPY       No Known Allergies   Social History     Socioeconomic History    Marital status:      Spouse name: Not on file    Number of children: Not on file    Years of education: Not on file    Highest education level: Not on file   Occupational History    Not on file   Tobacco Use    Smoking status: Never    Smokeless tobacco: Never   Vaping Use    Vaping Use: Never used   Substance and Sexual Activity    Alcohol use: No    Drug use: No    Sexual activity: Not on file   Other Topics Concern    Not on file   Social History Narrative    Not on file     Social Determinants of Health     Financial Resource Strain: Not on file   Food Insecurity: No Food Insecurity (3/5/2024)    Hunger Vital Sign     Worried About Running Out of Food in the Last Year: Never true     Ran Out of Food in the Last Year: Never true   Transportation Needs: No Transportation Needs (3/5/2024)    PRAPARE - Transportation     Lack of Transportation (Medical): No     Lack of Transportation (Non-Medical): No   Physical Activity: Not on file   Stress: Not on file   Social Connections: Not on file   Intimate Partner Violence: Not on file   Housing Stability: Low Risk  (3/5/2024)    Housing Stability Vital Sign     Unable to Pay for Housing in the Last Year: No     Number of Places Lived in the Last Year: 1     Unstable Housing  rhythm  Echocardiogram: No results found for this or any previous visit.    Cath: No results found for this or any previous visit.    EP: No results found for this or any previous visit.      Labs:     Recent Labs     03/07/24  0627 03/06/24  0519 03/06/24  0108 03/05/24 1951 03/05/24  1133   WBC 5.5 3.5*  --   --  3.2*   HGB 16.4 14.1 13.8   < > 12.6*   HCT 47.0 40.6* 40.9*   < > 37.6*   MCV 93.1 95.1  --   --  96.7    80*  --   --  76*    < > = values in this interval not displayed.     Lab Results   Component Value Date    WBC 5.5 03/07/2024    HGB 16.4 03/07/2024    HCT 47.0 03/07/2024     03/07/2024    ALT 40 03/05/2024    AST 34 03/05/2024     03/07/2024    K 3.7 03/07/2024     03/07/2024    CREATININE 1.40 03/07/2024    BUN 17 03/07/2024    CO2 28 03/07/2024    INR 1.3 03/05/2024    LABA1C 8.4 (H) 03/07/2024        Pt has been seen and examined by Dr. Hogue. They agree with the following assessment and plan.     Assessment/Plan:       GI bleed with Melena  - Melena for several days PTA, BP 80/50 on EMS arrival. s/p EGD on 3/6 w/ GI w/ mild portal hypertensive gastropathy, gastric ulcers  - Hgb improved from arrival, now 16.4, was 12.6, anticipate this contributed to hypotension on arrival  - mgmt per primary and GI       Sinus Bradycardia  - noted on EMS arrival w/ HR in 50s s/p 1mg Atropine x 2  - EKG on arrival in NSR/sinus kannan - HR borderline at 58 and telemetry reviewed with HR at 39 at times overnight and mid 40s otherwise in 50-60s. Fortunately pt seems relatively asymptomatic, no lightheadedness, dizziness, syncope/near syncope while hospitalized and not on rate controlling agents  - monitor on tele but as pt asymptomatic no acute intervention felt to be necessary at this time  - Echo ordered and pending      Hypertension, essential, benign    Hypotension  - hypotensive on EMS arrival s/p 1L NS and actually currently w/ some HTN, pt no longer on antihypertensives at home,

## 2024-03-08 VITALS
WEIGHT: 198 LBS | OXYGEN SATURATION: 98 % | SYSTOLIC BLOOD PRESSURE: 118 MMHG | TEMPERATURE: 97.5 F | HEART RATE: 56 BPM | HEIGHT: 71 IN | BODY MASS INDEX: 27.72 KG/M2 | RESPIRATION RATE: 18 BRPM | DIASTOLIC BLOOD PRESSURE: 71 MMHG

## 2024-03-08 LAB
ANION GAP SERPL CALC-SCNC: 5 MMOL/L (ref 2–11)
BASOPHILS # BLD: 0 K/UL (ref 0–0.2)
BASOPHILS NFR BLD: 1 % (ref 0–2)
BUN SERPL-MCNC: 16 MG/DL (ref 8–23)
CALCIUM SERPL-MCNC: 8.8 MG/DL (ref 8.3–10.4)
CHLORIDE SERPL-SCNC: 107 MMOL/L (ref 103–113)
CO2 SERPL-SCNC: 28 MMOL/L (ref 21–32)
CREAT SERPL-MCNC: 1 MG/DL (ref 0.8–1.5)
DIFFERENTIAL METHOD BLD: ABNORMAL
EOSINOPHIL # BLD: 0.2 K/UL (ref 0–0.8)
EOSINOPHIL NFR BLD: 5 % (ref 0.5–7.8)
ERYTHROCYTE [DISTWIDTH] IN BLOOD BY AUTOMATED COUNT: 12.3 % (ref 11.9–14.6)
GLUCOSE BLD STRIP.AUTO-MCNC: 206 MG/DL (ref 65–100)
GLUCOSE SERPL-MCNC: 203 MG/DL (ref 65–100)
HCT VFR BLD AUTO: 43.4 % (ref 41.1–50.3)
HGB BLD-MCNC: 15.2 G/DL (ref 13.6–17.2)
IMM GRANULOCYTES # BLD AUTO: 0 K/UL (ref 0–0.5)
IMM GRANULOCYTES NFR BLD AUTO: 0 % (ref 0–5)
LYMPHOCYTES # BLD: 1.5 K/UL (ref 0.5–4.6)
LYMPHOCYTES NFR BLD: 32 % (ref 13–44)
MCH RBC QN AUTO: 32.3 PG (ref 26.1–32.9)
MCHC RBC AUTO-ENTMCNC: 35 G/DL (ref 31.4–35)
MCV RBC AUTO: 92.3 FL (ref 82–102)
MONOCYTES # BLD: 0.3 K/UL (ref 0.1–1.3)
MONOCYTES NFR BLD: 7 % (ref 4–12)
NEUTS SEG # BLD: 2.5 K/UL (ref 1.7–8.2)
NEUTS SEG NFR BLD: 54 % (ref 43–78)
NRBC # BLD: 0 K/UL (ref 0–0.2)
PLATELET # BLD AUTO: 97 K/UL (ref 150–450)
PMV BLD AUTO: 9.8 FL (ref 9.4–12.3)
POTASSIUM SERPL-SCNC: 3.5 MMOL/L (ref 3.5–5.1)
RBC # BLD AUTO: 4.7 M/UL (ref 4.23–5.6)
SERVICE CMNT-IMP: ABNORMAL
SODIUM SERPL-SCNC: 140 MMOL/L (ref 136–146)
WBC # BLD AUTO: 4.5 K/UL (ref 4.3–11.1)

## 2024-03-08 PROCEDURE — 97116 GAIT TRAINING THERAPY: CPT

## 2024-03-08 PROCEDURE — 97165 OT EVAL LOW COMPLEX 30 MIN: CPT

## 2024-03-08 PROCEDURE — 97530 THERAPEUTIC ACTIVITIES: CPT

## 2024-03-08 PROCEDURE — 2580000003 HC RX 258: Performed by: FAMILY MEDICINE

## 2024-03-08 PROCEDURE — 85025 COMPLETE CBC W/AUTO DIFF WBC: CPT

## 2024-03-08 PROCEDURE — 80048 BASIC METABOLIC PNL TOTAL CA: CPT

## 2024-03-08 PROCEDURE — 6370000000 HC RX 637 (ALT 250 FOR IP): Performed by: FAMILY MEDICINE

## 2024-03-08 PROCEDURE — 97161 PT EVAL LOW COMPLEX 20 MIN: CPT

## 2024-03-08 PROCEDURE — 36415 COLL VENOUS BLD VENIPUNCTURE: CPT

## 2024-03-08 PROCEDURE — 82962 GLUCOSE BLOOD TEST: CPT

## 2024-03-08 PROCEDURE — 6370000000 HC RX 637 (ALT 250 FOR IP): Performed by: INTERNAL MEDICINE

## 2024-03-08 RX ORDER — PANTOPRAZOLE SODIUM 40 MG/1
40 TABLET, DELAYED RELEASE ORAL
Qty: 180 TABLET | Refills: 0 | Status: SHIPPED | OUTPATIENT
Start: 2024-03-08

## 2024-03-08 RX ADMIN — INSULIN LISPRO 2 UNITS: 100 INJECTION, SOLUTION INTRAVENOUS; SUBCUTANEOUS at 08:08

## 2024-03-08 RX ADMIN — SODIUM CHLORIDE, PRESERVATIVE FREE 10 ML: 5 INJECTION INTRAVENOUS at 08:09

## 2024-03-08 RX ADMIN — VITAMIN D, TAB 1000IU (100/BT) 2000 UNITS: 25 TAB at 08:09

## 2024-03-08 RX ADMIN — PANTOPRAZOLE SODIUM 40 MG: 40 TABLET, DELAYED RELEASE ORAL at 05:22

## 2024-03-08 RX ADMIN — LEVOTHYROXINE SODIUM 125 MCG: 0.12 TABLET ORAL at 05:22

## 2024-03-08 ASSESSMENT — PAIN SCALES - GENERAL: PAINLEVEL_OUTOF10: 0

## 2024-03-08 NOTE — PROGRESS NOTES
ACUTE PHYSICAL THERAPY GOALS:   (Developed with and agreed upon by patient and/or caregiver.)    (1.) Dennis Santos  will move from supine to sit and sit to supine , scoot up and down, and roll side to side with INDEPENDENT within 7 treatment day(s).    (2.) Dennis Santos will transfer from bed to chair and chair to bed with MODIFIED INDEPENDENCE using the least restrictive device within 7 treatment day(s).    (3.) Dennis Santos will ambulate with MODIFIED INDEPENDENCE for 1000 feet with the least restrictive device within 7 treatment day(s).   (4.) Dennis Santos will perform therapeutic exercises x 12 min for HEP with SUPERVISION to improve strength, endurance, and functional mobility within 7 treatment day(s).      PHYSICAL THERAPY Initial Assessment, Daily Note, and AM  (Link to Caseload Tracking: PT Visit Days : 1  Acknowledge Orders  Time In/Out  PT Charge Capture  Rehab Caseload Tracker    Dennis Santos is a 83 y.o. male   PRIMARY DIAGNOSIS: GI bleed  Melena [K92.1]  GI bleed [K92.2]  Near syncope [R55]  Melanotic stools [K92.1]  Cirrhosis of liver with ascites, unspecified hepatic cirrhosis type (HCC) [K74.60, R18.8]  Procedure(s) (LRB):  ESOPHAGOGASTRODUODENOSCOPY BIOPSY (N/A)  2 Days Post-Op  Reason for Referral: Generalized Muscle Weakness (M62.81)  Inpatient: Payor: [employer] MEDICARE / Plan: [employer] CHOICE-PPO MEDICARE / Product Type: *No Product type* /     ASSESSMENT:     REHAB RECOMMENDATIONS:   Recommendation to date pending progress:  Setting:  No further skilled physical therapy after discharge from hospital    Equipment:    To Be Determined     ASSESSMENT:   Mr. Santos is a 83 year old male who presents to hospital secondary to above diagnosis. Pt reports that he lives with wife in 1 level home with ramped entrance, use rollator outside and cane inside, was I with ADLs and mob PTA, states that he was walking approx 4 miles a day. This date pt performs mobility including STS,  sit to sup and amb 250' with RW with SBA for safety. Pt presents as functioning slightly below his baseline, with deficits in mobility including transfers, gait, balance, and activity tolerance. Pt will benefit from skilled therapy services to address stated deficits to promote return to highest level of function, independence, and safety. Will continue to follow. .     [facility]™ “6 Clicks” Basic Mobility Inpatient Short Form  -PAC Basic Mobility - Inpatient   How much help is needed turning from your back to your side while in a flat bed without using bedrails?: None  How much help is needed moving from lying on your back to sitting on the side of a flat bed without using bedrails?: None  How much help is needed moving to and from a bed to a chair?: None  How much help is needed standing up from a chair using your arms?: None  How much help is needed walking in hospital room?: A Little  How much help is needed climbing 3-5 steps with a railing?: A Little  AM-[facility] Inpatient Mobility Raw Score : 22  AM-PAC Inpatient T-Scale Score : 53.28  Mobility Inpatient CMS 0-100% Score: 20.91  Mobility Inpatient CMS G-Code Modifier : CJ    SUBJECTIVE:   Mr. Santos states, \"Doing good\"     Social/Functional Lives With: Spouse (who is disabled)  Type of Home: House  Home Layout: One level  Home Access: Ramped entrance  Home Equipment: Walker, rolling, Rollator  Has the patient had two or more falls in the past year or any fall with injury in the past year?: No  ADL Assistance: Independent  Homemaking Assistance: Independent  Active : No    OBJECTIVE:     PAIN: VITALS / O2: PRECAUTION / LINES / DRAINS:   Pre Treatment:   Pain Assessment: None - Denies Pain      Post Treatment: none stated Vitals        Oxygen      None    RESTRICTIONS/PRECAUTIONS:                    GROSS EVALUATION:  Intact Impaired (Comments):   AROM []     PROM []    Strength []     Balance [] Sitting - Static: Good  Sitting - Dynamic:  therapy have been discussed with the patient.)  Therapeutic Activity  Therapeutic Exercise/HEP  Gait Training  Education       TREATMENT:   EVALUATION: LOW COMPLEXITY: (Untimed Charge)    TREATMENT:   Therapeutic Activity (12 Minutes): Therapeutic activity included Sit to Supine, Transfer Training, Sitting balance , and Standing balance to improve functional Activity tolerance, Balance, Mobility, and Strength.  Gait Training (16 Minutes): Gait training for 250 feet utilizing Rolling Walker. Patient required Verbal cueing to improve Gait Mechanics.     TREATMENT GRID:  N/A    AFTER TREATMENT PRECAUTIONS: Bed, Bed/Chair Locked, Call light within reach, and Needs within reach    INTERDISCIPLINARY COLLABORATION:  RN/ PCT and RN Case Manager/      EDUCATION: Education Given To: Patient  Education Provided: Role of Therapy;Plan of Care;Precautions;Fall Prevention Strategies;Equipment;Transfer Training  Education Method: Verbal  Barriers to Learning: None  Education Outcome: Verbalized understanding    TIME IN/OUT:  Time In: 0920  Time Out: 0952  Minutes: 32    Ravinder Arriaza PT

## 2024-03-08 NOTE — THERAPY EVALUATION
ACUTE OCCUPATIONAL THERAPY GOALS:   (Developed with and agreed upon by patient and/or caregiver.)  (1.) Dennis Santos will complete functional transfers with SUPERVISION and adaptive equipment as needed. GOAL MET 3/8/24  (2.) Dennis Santos will complete functional mobility of household distances with SUPERVISION and adaptive equipment as needed. GOAL MET 3/8/24     Timeframe: 1 visit       OCCUPATIONAL THERAPY Initial Assessment, Daily Note, and Discharge       OT Visit Days: 1  Acknowledge Orders  Time  OT Charge Capture  Rehab Caseload Tracker      Dennis Santos is a 83 y.o. male   PRIMARY DIAGNOSIS: GI bleed  Melena [K92.1]  GI bleed [K92.2]  Near syncope [R55]  Melanotic stools [K92.1]  Cirrhosis of liver with ascites, unspecified hepatic cirrhosis type (HCC) [K74.60, R18.8]  Procedure(s) (LRB):  ESOPHAGOGASTRODUODENOSCOPY BIOPSY (N/A)  2 Days Post-Op  Reason for Referral: Generalized Muscle Weakness (M62.81)  Inpatient: Payor: [employer] MEDICARE / Plan: [employer] CHOICE-O MEDICARE / Product Type: *No Product type* /     ASSESSMENT:     REHAB RECOMMENDATIONS:   Recommendation to date pending progress:  Setting:  No further skilled occupational therapy after discharge from hospital    Equipment:    None - has RW and rollator     ASSESSMENT:  Mr. Santos is a 84 y/o male who presented this admission for melena and near syncopal episode, s/p EGD. At baseline pt lives with his wife who is disabled, is independent with ADLs/IADLs, and uses a RW/rollator for mobility. Pt reports that he assist his wife as needed and he typically is very active walking ~6 miles/day.     This date, pt presented supine and agreeable to session. Pt overall SBA progressing to supervision for functional transfers and mobility of community distance with RW. Pt denied any ADL needs at this time, stating he has been getting up ad-fei without issue. No complaints of lightheadedness or dizziness during session. Pt reports he is  feeling better and ready to get home. Pt left seated in bedside chair with his breakfast tray and all needs met. Today pt presents functioning at his baseline and has no further acute OT needs. Educated pt on taking time with position changes and easing back into activity at d/c. Will d/c from caseload.     [facility] AM-[facility]™ “6 Clicks” Daily Activity Inpatient Short Form:    AM-PAC Daily Activity - Inpatient   How much help is needed for putting on and taking off regular lower body clothing?: None  How much help is needed for bathing (which includes washing, rinsing, drying)?: None  How much help is needed for toileting (which includes using toilet, bedpan, or urinal)?: None  How much help is needed for putting on and taking off regular upper body clothing?: None  How much help is needed for taking care of personal grooming?: None  How much help for eating meals?: None  [facility]: 24  AM-PAC Inpatient ADL T-Scale Score : 57.54  ADL Inpatient CMS 0-100% Score: 0  ADL Inpatient CMS G-Code Modifier : CH           SUBJECTIVE:     Mr. 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\"     Social/Functional Lives With: Spouse (who is disabled)  Type of Home: House  Home Layout: One level  Home Access: Ramped entrance  Home Equipment: Walker, rolling, Rollator  Has the patient had two or more falls in the past year or any fall with injury in the past year?: No  ADL Assistance: Independent  Homemaking Assistance: Independent  Active : No    OBJECTIVE:     LINES / DRAINS / AIRWAY: None    RESTRICTIONS/PRECAUTIONS:       PAIN: VITALS / O2:   Pre Treatment:    Did not c/o pain      Post Treatment: same       Vitals          Oxygen   RA         GROSS EVALUATION: INTACT IMPAIRED   (See Comments)   UE AROM [x] []   UE PROM [x] []   Strength [x]       Posture / Balance [] Sitting - Static: Good  Sitting - Dynamic: Good  Standing - Static: Fair, +  Standing -  Mod=Moderate Assistance, Max=Maximal Assistance, Total=Total Assistance, NT=Not Tested    PLAN:   FREQUENCY/DURATION   OT Plan of Care:  (EVAL AND D/C) for duration of hospital stay or until stated goals are met, whichever comes first.    PROBLEM LIST:   (Skilled intervention is medically necessary to address:)  Decreased ADL/Functional Activities   INTERVENTIONS PLANNED:  (Benefits and precautions of occupational therapy have been discussed with the patient.)  Therapeutic Activity  Education         TREATMENT:     EVALUATION: LOW COMPLEXITY: (Untimed Charge)    TREATMENT:   Therapeutic Activity (15 Minutes): Patient participated in therapeutic activities including bed mobility training, functional transfer training, adaptive equipment training, functional mobility of community distances, sitting tolerance activity, and standing tolerance activity with minimal verbal cues and education in order to increase independence, increase activity tolerance, prepare for functional activity, and prepare for discharge home.     TREATMENT GRID:  N/A    AFTER TREATMENT PRECAUTIONS: Bed/Chair Locked, Call light within reach, Chair, Heels floated, Needs within reach, and RN notified    INTERDISCIPLINARY COLLABORATION:  RN/ PCT and PT/ PTA    EDUCATION:  Education Given To: Patient  Education Provided: Role of Therapy;Plan of Care  Education Method: Verbal  Barriers to Learning: None  Education Outcome: Verbalized understanding    TOTAL TREATMENT DURATION AND TIME:  Time In: 0810  Time Out: 0836  Minutes: 26    Ann Marie Rivas OT

## 2024-03-08 NOTE — DISCHARGE SUMMARY
0.2   MONOSABS 0.4 0.3 0.3   BASOSABS 0.0 0.0 0.0   ABSIMMGRAN 0.0 0.0 0.0      LFT Recent Labs     03/05/24  1133   BILITOT 0.7   ALKPHOS 89   AST 34   ALT 40   PROT 5.2*   LABALBU 2.6*   GLOB 2.6*      Cardiac  Lab Results   Component Value Date/Time    TROPHS 7.4 03/05/2024 11:33 AM    TROPHS 3.9 01/24/2023 06:52 PM    TROPHS 3.7 01/24/2023 04:51 PM      Coags Lab Results   Component Value Date/Time    PROTIME 16.5 03/05/2024 11:33 AM    INR 1.3 03/05/2024 11:33 AM      A1c Lab Results   Component Value Date/Time    LABA1C 8.4 03/07/2024 06:27 AM    LABA1C 7.3 10/27/2021 07:36 AM     03/07/2024 06:27 AM     10/27/2021 07:36 AM      Lipids No results found for: \"CHOL\", \"LDLCALC\", \"HDL\", \"CHOLHDLRATIO\", \"TRIG\"   Thyroid  Lab Results   Component Value Date/Time    TSHELE 0.13 03/05/2024 11:33 AM        Most Recent UA No results found for: \"COLORU\", \"APPEARANCE\", \"SPECGRAV\", \"LABPH\", \"PROTEINU\", \"GLUCOSEU\", \"KETUA\", \"BILIRUBINUR\", \"BLOODU\", \"UROBILINOGEN\", \"NITRU\", \"LEUKOCYTESUR\", \"WBCUA\", \"RBCUA\", \"EPITHUA\", \"BACTERIA\", \"LABCAST\", \"MUCUS\"     Microbiology:  Results       ** No results found for the last 336 hours. **            All Labs from Last 24 Hrs:  Recent Results (from the past 24 hour(s))   POCT Glucose    Collection Time: 03/07/24 11:02 AM   Result Value Ref Range    POC Glucose 329 (H) 65 - 100 mg/dL    Performed by: PruittLindaV    Echo (TTE) complete (PRN contrast/bubble/strain/3D)    Collection Time: 03/07/24  1:43 PM   Result Value Ref Range    LV EDV A2C 96 mL    LV EDV A4C 103 mL    LV ESV A2C 39 mL    LV ESV A4C 40 mL    IVSd 1.2 (A) 0.6 - 1.0 cm    LVIDd 5.0 4.2 - 5.9 cm    LVIDs 3.4 cm    LVOT Diameter 2.1 cm    LVOT Mean Gradient 2 mmHg    LVOT Mean Gradient 2 mmHg    LVOT VTI 23.3 cm    LVOT Peak Velocity 1.0 m/s    LVOT Peak Gradient 4 mmHg    LVPWd 1.0 0.6 - 1.0 cm    LV E' Lateral Velocity 9 cm/s    LV E' Septal Velocity 6 cm/s    LV Ejection Fraction A2C 59 %    LV Ejection  Fraction A4C 61 %    EF BP 61 55 - 100 %    LVOT Area 3.5 cm2    LVOT SV 80.7 ml    LA Minor Axis 5.5 cm    LA Major Rogers 6.2 cm    LA Area 2C 16.3 cm2    LA Area 4C 17.3 cm2    LA Volume MOD A2C 39 18 - 58 mL    LA Volume MOD A4C 36 18 - 58 mL    LA Volume BP 40 18 - 58 mL    LA Diameter 2.9 cm    AV Mean Velocity 1.0 m/s    AV Mean Gradient 5 mmHg    AV VTI 28.8 cm    AV Peak Velocity 1.5 m/s    AV Peak Gradient 9 mmHg    AV Area by VTI 2.8 cm2    AV Area by Peak Velocity 2.3 cm2    Aortic Root 3.9 cm    Ascending Aorta 3.4 cm    IVC Proxmal 1.8 cm    MV E Wave Deceleration Time 417.0 ms    MV A Velocity 0.76 m/s    MV E Velocity 0.50 m/s    MV Mean Gradient 1 mmHg    MV VTI 22.8 cm    MV Mean Velocity 0.4 m/s    MV Max Velocity 0.8 m/s    MV Peak Gradient 3 mmHg    MV Area by VTI 3.5 cm2    PV .0 ms    PV Max Velocity 1.2 m/s    PV Peak Gradient 6 mmHg    RVIDd 3.0 cm    RV Free Wall Peak S' 12 cm/s    TAPSE 2.1 1.7 cm    Body Surface Area 2.12 m2    Fractional Shortening 2D 32 28 - 44 %    LV ESV Index A4C 19 mL/m2    LV EDV Index A4C 49 mL/m2    LV ESV Index A2C 19 mL/m2    LV EDV Index A2C 46 mL/m2    LVIDd Index 2.38 cm/m2    LVIDs Index 1.62 cm/m2    LV RWT Ratio 0.40     LV Mass 2D 207.1 88 - 224 g    LV Mass 2D Index 98.6 49 - 115 g/m2    MV E/A 0.66     E/E' Ratio (Averaged) 6.94     E/E' Lateral 5.56     LA Volume Index BP 19 16 - 34 ml/m2    LVOT Stroke Volume Index 38.4 mL/m2    LA Volume Index MOD A2C 19 16 - 34 ml/m2    LA Volume Index MOD A4C 17 16 - 34 ml/m2    LA Size Index 1.38 cm/m2    LA/AO Root Ratio 0.74     Ao Root Index 1.86 cm/m2    Ascending Aorta Index 1.62 cm/m2    AV Velocity Ratio 0.67     LVOT:AV VTI Index 0.81     MARYA/BSA VTI 1.3 cm2/m2    MARYA/BSA Peak Velocity 1.1 cm2/m2    MV:LVOT VTI Index 0.98    POCT Glucose    Collection Time: 03/07/24  3:48 PM   Result Value Ref Range    POC Glucose 282 (H) 65 - 100 mg/dL    Performed by: Lenny    POCT Glucose    Collection Time:  Therapy  SpO2: 98 %  Pulse via Oximetry: 53 beats per minute  Pulse Oximeter Device Mode: Continuous  Pulse Oximeter Device Location: Right  O2 Device: None (Room air)  O2 Flow Rate (L/min): 4 L/min    Estimated body mass index is 27.62 kg/m² as calculated from the following:    Height as of this encounter: 1.803 m (5' 11\").    Weight as of this encounter: 89.8 kg (198 lb).    Intake/Output Summary (Last 24 hours) at 3/8/2024 0911  Last data filed at 3/7/2024 1012  Gross per 24 hour   Intake --   Output 250 ml   Net -250 ml         Physical Exam:    General:    Well nourished.  No overt distress  CV:   RRR.  No m/r/g.  No JVD, no edema   Lungs:   CTAB.  No wheezing, rhonchi, or rales.  Respirations even, unlabored  Abdomen:   Soft, nontender, nondistended.    Extremities: Warm and dry.   No edema.    Skin:     No rashes.  Normal coloration  Neuro:  grossly intact.  Psych:  Normal mood and affect.    Signed:  Kaylee Gilliam MD    Part of this note may have been written by using a voice dictation software.  The note has been proof read but may still contain some grammatical/other typographical errors.

## 2024-03-08 NOTE — PROGRESS NOTES
Pt  bed in lowest position, wheels locked, and call light within reach. Hourly rounds completed. VSS. IV is patent and dressing is clean, dry, and intact.

## 2024-03-08 NOTE — CARE COORDINATION
Patient discharging home with spouse today. No discharge needs. Patients's ride will be here around 1300. IMM explained and signed.     Berhane GONZÁLESSW, ACM  [facility]

## 2024-03-11 ENCOUNTER — CARE COORDINATION (OUTPATIENT)
Dept: CARE COORDINATION | Facility: CLINIC | Age: 84
End: 2024-03-11

## 2024-03-11 NOTE — CARE COORDINATION
Transitions of Care Initial Call    Was this an external facility discharge? No Discharge Facility: [facility].    Challenges to be reviewed by the provider   Additional needs identified to be addressed with provider: No  none             Method of communication with provider : none    Spoke with patient states having some fatigue. Patient denies any increase bleeding from rectal. Patient states blood sugar was 183 this am. Patient states will follow up with PCP on 3/14/2024.  Advance Care Planning:   Does patient have an Advance Directive: reviewed and current and decision maker updated.     LPN Care Coordinator contacted the patient by telephone to perform post hospital discharge assessment. Verified name and  with patient as identifiers. Provided introduction to self, and explanation of the LPN CC role.     LPN CC reviewed discharge instructions, medical action plan and red flags with patient who verbalized understanding. Patient given an opportunity to ask questions and does not have any further questions or concerns at this time. Were discharge instructions available to patient? Yes. Reviewed appropriate site of care based on symptoms and resources available to patient including: PCP  Specialist  When to call 911. The patient agrees to contact the PCP office for questions related to their healthcare.     Medication reconciliation was performed with patient, who verbalizes understanding of administration of home medications. Advised obtaining a 90-day supply of all daily and as-needed medications.     Was patient discharged with a pulse oximeter? no    LPN CC provided contact information. Plan for follow-up call in 5-7 days based on severity of symptoms and risk factors.  Plan for next call: symptom management-diet, hydration, fall and safety precautions, medication compliance and follow up appointment.

## 2024-03-18 ENCOUNTER — CARE COORDINATION (OUTPATIENT)
Dept: CARE COORDINATION | Facility: CLINIC | Age: 84
End: 2024-03-18

## 2024-03-18 NOTE — CARE COORDINATION
Care Transitions Follow Up Call    Needs to be reviewed by the provider   Additional needs identified to be addressed with provider: No  none             Method of communication with provider : none    Spoke with patient states had an episode of dizziness three times. Patient denies any low blood pressure or low blood sugar. Patient states has a follow up appointment with the PCP today. Encourage patient to rise slowly upon standing to help with the dizziness . Patient verbalized understanding.    N Care Coordinator contacted the patient by telephone to follow up after admission on 3/5/2024. Verified name and  with patient as identifiers.    Addressed changes since last contact: none  Discussed follow-up appointments. If no appointment was previously scheduled, appointment scheduling offered: Yes.   Is follow up appointment scheduled within 7 days of discharge? Yes.    Advance Care Planning:   Does patient have an Advance Directive: reviewed and current and decision maker updated.     LPN CC reviewed discharge instructions, medical action plan and red flags with patient and discussed any barriers to care and/or understanding of plan of care after discharge. Discussed appropriate site of care based on symptoms and resources available to patient including: PCP  Specialist  When to call 911. The patient agrees to contact the PCP office for questions related to their healthcare.     Patients top risk factors for readmission: medical condition-GI Bleed  Interventions to address risk factors: Obtained and reviewed discharge summary and/or continuity of care documents      [facility] follow up appointment(s): PCP 3/18/2024    LPN CC provided contact information for future needs. Plan for follow-up call in 10-14 days based on severity of symptoms and risk factors.  Plan for next call: symptom management-Diet, hydration, fall and safety precautions, blood sugar log, medication compliance and follow up appointment.

## 2024-04-01 ENCOUNTER — CARE COORDINATION (OUTPATIENT)
Dept: CARE COORDINATION | Facility: CLINIC | Age: 84
End: 2024-04-01

## 2024-04-01 NOTE — CARE COORDINATION
Patient has graduated from the Care Transitions program on 4/1/2024.  Patient/family has the ability to self-manage at this time. Patient has no further care management needs, no referral to the ACM team for further management.     Spoke with patient states doing fine. Patient denies any recent episodes of dizziness. Patient states follow up with diabetic doctor and will follow up with PCP.    Patient has Care Transition Nurse's contact information for any further questions, concerns, or needs.  Patients upcoming visits:  No future appointments.

## 2024-04-03 NOTE — PROGRESS NOTES
Physician Progress Note      PATIENT:               EN CHARLES  CSN #:                  [id number]  :                       1940  ADMIT DATE:       3/5/2024 11:08 AM  DISCH DATE:        3/8/2024 11:25 AM  RESPONDING  PROVIDER #:        Kaylee Gilliam MD          QUERY TEXT:    Patient admitted with GI bleeding, noted to have trace esophageal varices,   multiple punctuate ulcers in the antrum on the EGD.  If possible, please   document in progress notes and discharge summary the cause of the GI bleeding:    The medical record reflects the following:  Risk Factors: 83 yr,  James cirrhosis, melena  Clinical Indicators: 3- EGD showing trace esophageal varices, portal   hypertensive gastropathy, antral ulcer, duodenitis  Treatment: Gi consult, EGD,  BID protonix for 8 weeks, then decrease to daily        [email or web address]  Options provided:  -- GI bleeding due to PUD  -- GI bleeding due to gastric ulcer  -- GI bleeding due to esophageal varices  -- GI bleeding due to, Please specify cause.  -- Other - I will add my own diagnosis  -- Disagree - Not applicable / Not valid  -- Disagree - Clinically unable to determine / Unknown  -- Refer to Clinical Documentation Reviewer    PROVIDER RESPONSE TEXT:    This patient has GI bleeding due to gastric ulcer.    Query created by: MALU LOTT on 3/14/2024 9:28 AM      Electronically signed by:  Kaylee Gilliam MD 4/3/2024 2:17 PM

## 2024-04-16 ENCOUNTER — APPOINTMENT (RX ONLY)
Dept: URBAN - METROPOLITAN AREA CLINIC 329 | Facility: CLINIC | Age: 84
Setting detail: DERMATOLOGY
End: 2024-04-16

## 2024-04-16 PROBLEM — C44.329 SQUAMOUS CELL CARCINOMA OF SKIN OF OTHER PARTS OF FACE: Status: ACTIVE | Noted: 2024-04-16

## 2024-04-16 PROCEDURE — 11642 EXC F/E/E/N/L MAL+MRG 1.1-2: CPT

## 2024-04-16 PROCEDURE — ? EXCISION

## 2024-04-16 PROCEDURE — 12052 INTMD RPR FACE/MM 2.6-5.0 CM: CPT

## 2024-04-16 NOTE — PROCEDURE: EXCISION
Accession #: [id number]
Size Of Lesion In Cm: 1.5
X Size Of Lesion In Cm (Optional): 0
Size Of Margin In Cm: 0.1
Was An Eye Clamp Used?: No
Eye Clamp Note Details: An eye clamp was used during the procedure.
Excision Method: Elliptical
Saucerization Depth: dermis and superficial adipose tissue
Repair Type: Intermediate
Intermediate / Complex Repair - Final Wound Length In Cm: 5
Suturegard Retention Suture: 2-0 Nylon
Retention Suture Bite Size: 3 mm
Length To Time In Minutes Device Was In Place: 10
Number Of Hemigard Strips Per Side: 1
Undermining Type: Entire Wound
Debridement Text: The wound edges were debrided prior to proceeding with the closure to facilitate wound healing.
Helical Rim Text: The closure involved the helical rim.
Vermilion Border Text: The closure involved the vermilion border.
Nostril Rim Text: The closure involved the nostril rim.
Retention Suture Text: Retention sutures were placed to support the closure and prevent dehiscence.
Suture Removal: 14 days
Lab: [id number]
Lab Facility: [id number]
Graft Donor Site Bandage (Optional-Leave Blank If You Don't Want In Note): Steri-strips and a pressure bandage were applied to the donor site.
Epidermal Closure Graft Donor Site (Optional): simple interrupted
Billing Type: Third-Party Bill
Excision Depth: adipose tissue
Scalpel Size: 15 blade
Anesthesia Type: 1% lidocaine with epinephrine
Hemostasis: Electrocautery
Estimated Blood Loss (Cc): minimal
Detail Level: Detailed
Deep Sutures: 5-0 Vicryl
Epidermal Sutures: 4-0 Ethilon
Wound Care: Petrolatum
Dressing: dry sterile dressing
Suturegard Intro: Intraoperative tissue expansion was performed, utilizing the SUTUREGARD device, in order to reduce wound tension.
Suturegard Body: The suture ends were repeatedly re-tightened and re-clamped to achieve the desired tissue expansion.
Hemigard Intro: Due to skin fragility and wound tension, it was decided to use HEMIGARD adhesive retention suture devices to permit a linear closure. The skin was cleaned and dried for a 6cm distance away from the wound. Excessive hair, if present, was removed to allow for adhesion.
Hemigard Postcare Instructions: The HEMIGARD strips are to remain completely dry for at least 5-7 days.
Positioning (Leave Blank If You Do Not Want): The patient was placed in a comfortable position exposing the surgical site.
Pre-Excision Curettage Text (Leave Blank If You Do Not Want): Prior to drawing the surgical margin the visible lesion was removed with electrodesiccation and curettage to clearly define the lesion size.
Complex Repair Preamble Text (Leave Blank If You Do Not Want): Extensive wide undermining was performed.
Intermediate Repair Preamble Text (Leave Blank If You Do Not Want): Undermining was performed with blunt dissection.
Curvilinear Excision Additional Text (Leave Blank If You Do Not Want): The margin was drawn around the clinically apparent lesion.  A curvilinear shape was then drawn on the skin incorporating the lesion and margins.  Incisions were then made along these lines to the appropriate tissue plane and the lesion was extirpated.
Fusiform Excision Additional Text (Leave Blank If You Do Not Want): The margin was drawn around the clinically apparent lesion.  A fusiform shape was then drawn on the skin incorporating the lesion and margins.  Incisions were then made along these lines to the appropriate tissue plane and the lesion was extirpated.
Elliptical Excision Additional Text (Leave Blank If You Do Not Want): The margin was drawn around the clinically apparent lesion.  An elliptical shape was then drawn on the skin incorporating the lesion and margins.  Incisions were then made along these lines to the appropriate tissue plane and the lesion was extirpated.
Saucerization Excision Additional Text (Leave Blank If You Do Not Want): The margin was drawn around the clinically apparent lesion.  Incisions were then made along these lines, in a tangential fashion, to the appropriate tissue plane and the lesion was extirpated.
Slit Excision Additional Text (Leave Blank If You Do Not Want): A linear line was drawn on the skin overlying the lesion. An incision was made slowly until the lesion was visualized.  Once visualized, the lesion was removed with blunt dissection.
Excisional Biopsy Additional Text (Leave Blank If You Do Not Want): The margin was drawn around the clinically apparent lesion. An elliptical shape was then drawn on the skin incorporating the lesion and margins.  Incisions were then made along these lines to the appropriate tissue plane and the lesion was extirpated.
Perilesional Excision Additional Text (Leave Blank If You Do Not Want): The margin was drawn around the clinically apparent lesion. Incisions were then made along these lines to the appropriate tissue plane and the lesion was extirpated.
Repair Performed By Another Provider Text (Leave Blank If You Do Not Want): After the tissue was excised the defect was repaired by another provider.
No Repair - Repaired With Adjacent Surgical Defect Text (Leave Blank If You Do Not Want): After the excision the defect was repaired concurrently with another surgical defect which was in close approximation.
Adjacent Tissue Transfer Text: The defect edges were debeveled with a #15 scalpel blade.  Given the location of the defect and the proximity to free margins an adjacent tissue transfer was deemed most appropriate.  Using a sterile surgical marker, an appropriate flap was drawn incorporating the defect and placing the expected incisions within the relaxed skin tension lines where possible.    The area thus outlined was incised deep to adipose tissue with a #15 scalpel blade.  The skin margins were undermined to an appropriate distance in all directions utilizing iris scissors.
Advancement Flap (Single) Text: The defect edges were debeveled with a #15 scalpel blade.  Given the location of the defect and the proximity to free margins a single advancement flap was deemed most appropriate.  Using a sterile surgical marker, an appropriate advancement flap was drawn incorporating the defect and placing the expected incisions within the relaxed skin tension lines where possible.    The area thus outlined was incised deep to adipose tissue with a #15 scalpel blade.  The skin margins were undermined to an appropriate distance in all directions utilizing iris scissors.
Advancement Flap (Double) Text: The defect edges were debeveled with a #15 scalpel blade.  Given the location of the defect and the proximity to free margins a double advancement flap was deemed most appropriate.  Using a sterile surgical marker, the appropriate advancement flaps were drawn incorporating the defect and placing the expected incisions within the relaxed skin tension lines where possible.    The area thus outlined was incised deep to adipose tissue with a #15 scalpel blade.  The skin margins were undermined to an appropriate distance in all directions utilizing iris scissors.
Burow's Advancement Flap Text: The defect edges were debeveled with a #15 scalpel blade.  Given the location of the defect and the proximity to free margins a Burow's advancement flap was deemed most appropriate.  Using a sterile surgical marker, the appropriate advancement flap was drawn incorporating the defect and placing the expected incisions within the relaxed skin tension lines where possible.    The area thus outlined was incised deep to adipose tissue with a #15 scalpel blade.  The skin margins were undermined to an appropriate distance in all directions utilizing iris scissors.
Chonodrocutaneous Helical Advancement Flap Text: The defect edges were debeveled with a #15 scalpel blade.  Given the location of the defect and the proximity to free margins a chondrocutaneous helical advancement flap was deemed most appropriate.  Using a sterile surgical marker, the appropriate advancement flap was drawn incorporating the defect and placing the expected incisions within the relaxed skin tension lines where possible.    The area thus outlined was incised deep to adipose tissue with a #15 scalpel blade.  The skin margins were undermined to an appropriate distance in all directions utilizing iris scissors.
Crescentic Advancement Flap Text: The defect edges were debeveled with a #15 scalpel blade.  Given the location of the defect and the proximity to free margins a crescentic advancement flap was deemed most appropriate.  Using a sterile surgical marker, the appropriate advancement flap was drawn incorporating the defect and placing the expected incisions within the relaxed skin tension lines where possible.    The area thus outlined was incised deep to adipose tissue with a #15 scalpel blade.  The skin margins were undermined to an appropriate distance in all directions utilizing iris scissors.
A-T Advancement Flap Text: The defect edges were debeveled with a #15 scalpel blade.  Given the location of the defect, shape of the defect and the proximity to free margins an A-T advancement flap was deemed most appropriate.  Using a sterile surgical marker, an appropriate advancement flap was drawn incorporating the defect and placing the expected incisions within the relaxed skin tension lines where possible.    The area thus outlined was incised deep to adipose tissue with a #15 scalpel blade.  The skin margins were undermined to an appropriate distance in all directions utilizing iris scissors.
O-T Advancement Flap Text: The defect edges were debeveled with a #15 scalpel blade.  Given the location of the defect, shape of the defect and the proximity to free margins an O-T advancement flap was deemed most appropriate.  Using a sterile surgical marker, an appropriate advancement flap was drawn incorporating the defect and placing the expected incisions within the relaxed skin tension lines where possible.    The area thus outlined was incised deep to adipose tissue with a #15 scalpel blade.  The skin margins were undermined to an appropriate distance in all directions utilizing iris scissors.
O-L Flap Text: The defect edges were debeveled with a #15 scalpel blade.  Given the location of the defect, shape of the defect and the proximity to free margins an O-L flap was deemed most appropriate.  Using a sterile surgical marker, an appropriate advancement flap was drawn incorporating the defect and placing the expected incisions within the relaxed skin tension lines where possible.    The area thus outlined was incised deep to adipose tissue with a #15 scalpel blade.  The skin margins were undermined to an appropriate distance in all directions utilizing iris scissors.
O-Z Flap Text: The defect edges were debeveled with a #15 scalpel blade.  Given the location of the defect, shape of the defect and the proximity to free margins an O-Z flap was deemed most appropriate.  Using a sterile surgical marker, an appropriate transposition flap was drawn incorporating the defect and placing the expected incisions within the relaxed skin tension lines where possible. The area thus outlined was incised deep to adipose tissue with a #15 scalpel blade.  The skin margins were undermined to an appropriate distance in all directions utilizing iris scissors.
Double O-Z Flap Text: The defect edges were debeveled with a #15 scalpel blade.  Given the location of the defect, shape of the defect and the proximity to free margins a Double O-Z flap was deemed most appropriate.  Using a sterile surgical marker, an appropriate transposition flap was drawn incorporating the defect and placing the expected incisions within the relaxed skin tension lines where possible. The area thus outlined was incised deep to adipose tissue with a #15 scalpel blade.  The skin margins were undermined to an appropriate distance in all directions utilizing iris scissors.
V-Y Flap Text: The defect edges were debeveled with a #15 scalpel blade.  Given the location of the defect, shape of the defect and the proximity to free margins a V-Y flap was deemed most appropriate.  Using a sterile surgical marker, an appropriate advancement flap was drawn incorporating the defect and placing the expected incisions within the relaxed skin tension lines where possible.    The area thus outlined was incised deep to adipose tissue with a #15 scalpel blade.  The skin margins were undermined to an appropriate distance in all directions utilizing iris scissors.
Advancement-Rotation Flap Text: The defect edges were debeveled with a #15 scalpel blade.  Given the location of the defect, shape of the defect and the proximity to free margins an advancement-rotation flap was deemed most appropriate.  Using a sterile surgical marker, an appropriate flap was drawn incorporating the defect and placing the expected incisions within the relaxed skin tension lines where possible. The area thus outlined was incised deep to adipose tissue with a #15 scalpel blade.  The skin margins were undermined to an appropriate distance in all directions utilizing iris scissors.
Mercedes Flap Text: The defect edges were debeveled with a #15 scalpel blade.  Given the location of the defect, shape of the defect and the proximity to free margins a Mercedes flap was deemed most appropriate.  Using a sterile surgical marker, an appropriate advancement flap was drawn incorporating the defect and placing the expected incisions within the relaxed skin tension lines where possible. The area thus outlined was incised deep to adipose tissue with a #15 scalpel blade.  The skin margins were undermined to an appropriate distance in all directions utilizing iris scissors.
Modified Advancement Flap Text: The defect edges were debeveled with a #15 scalpel blade.  Given the location of the defect, shape of the defect and the proximity to free margins a modified advancement flap was deemed most appropriate.  Using a sterile surgical marker, an appropriate advancement flap was drawn incorporating the defect and placing the expected incisions within the relaxed skin tension lines where possible.    The area thus outlined was incised deep to adipose tissue with a #15 scalpel blade.  The skin margins were undermined to an appropriate distance in all directions utilizing iris scissors.
Mucosal Advancement Flap Text: Given the location of the defect, shape of the defect and the proximity to free margins a mucosal advancement flap was deemed most appropriate. Incisions were made with a 15 blade scalpel in the appropriate fashion along the cutaneous vermilion border and the mucosal lip. The remaining actinically damaged mucosal tissue was excised.  The mucosal advancement flap was then elevated to the gingival sulcus with care taken to preserve the neurovascular structures and advanced into the primary defect. Care was taken to ensure that precise realignment of the vermilion border was achieved.
Peng Advancement Flap Text: The defect edges were debeveled with a #15 scalpel blade.  Given the location of the defect, shape of the defect and the proximity to free margins a Peng advancement flap was deemed most appropriate.  Using a sterile surgical marker, an appropriate advancement flap was drawn incorporating the defect and placing the expected incisions within the relaxed skin tension lines where possible. The area thus outlined was incised deep to adipose tissue with a #15 scalpel blade.  The skin margins were undermined to an appropriate distance in all directions utilizing iris scissors.
Hatchet Flap Text: The defect edges were debeveled with a #15 scalpel blade.  Given the location of the defect, shape of the defect and the proximity to free margins a hatchet flap was deemed most appropriate.  Using a sterile surgical marker, an appropriate hatchet flap was drawn incorporating the defect and placing the expected incisions within the relaxed skin tension lines where possible.    The area thus outlined was incised deep to adipose tissue with a #15 scalpel blade.  The skin margins were undermined to an appropriate distance in all directions utilizing iris scissors.
Rotation Flap Text: The defect edges were debeveled with a #15 scalpel blade.  Given the location of the defect, shape of the defect and the proximity to free margins a rotation flap was deemed most appropriate.  Using a sterile surgical marker, an appropriate rotation flap was drawn incorporating the defect and placing the expected incisions within the relaxed skin tension lines where possible.    The area thus outlined was incised deep to adipose tissue with a #15 scalpel blade.  The skin margins were undermined to an appropriate distance in all directions utilizing iris scissors.
Bilateral Rotation Flap Text: The defect edges were debeveled with a #15 scalpel blade. Given the location of the defect, shape of the defect and the proximity to free margins a bilateral rotation flap was deemed most appropriate. Using a sterile surgical marker, an appropriate rotation flap was drawn incorporating the defect and placing the expected incisions within the relaxed skin tension lines where possible. The area thus outlined was incised deep to adipose tissue with a #15 scalpel blade. The skin margins were undermined to an appropriate distance in all directions utilizing iris scissors. Following this, the designed flap was carried over into the primary defect and sutured into place.
Spiral Flap Text: The defect edges were debeveled with a #15 scalpel blade.  Given the location of the defect, shape of the defect and the proximity to free margins a spiral flap was deemed most appropriate.  Using a sterile surgical marker, an appropriate rotation flap was drawn incorporating the defect and placing the expected incisions within the relaxed skin tension lines where possible. The area thus outlined was incised deep to adipose tissue with a #15 scalpel blade.  The skin margins were undermined to an appropriate distance in all directions utilizing iris scissors.
Staged Advancement Flap Text: The defect edges were debeveled with a #15 scalpel blade.  Given the location of the defect, shape of the defect and the proximity to free margins a staged advancement flap was deemed most appropriate.  Using a sterile surgical marker, an appropriate advancement flap was drawn incorporating the defect and placing the expected incisions within the relaxed skin tension lines where possible. The area thus outlined was incised deep to adipose tissue with a #15 scalpel blade.  The skin margins were undermined to an appropriate distance in all directions utilizing iris scissors.
Star Wedge Flap Text: The defect edges were debeveled with a #15 scalpel blade.  Given the location of the defect, shape of the defect and the proximity to free margins a star wedge flap was deemed most appropriate.  Using a sterile surgical marker, an appropriate rotation flap was drawn incorporating the defect and placing the expected incisions within the relaxed skin tension lines where possible. The area thus outlined was incised deep to adipose tissue with a #15 scalpel blade.  The skin margins were undermined to an appropriate distance in all directions utilizing iris scissors.
Transposition Flap Text: The defect edges were debeveled with a #15 scalpel blade.  Given the location of the defect and the proximity to free margins a transposition flap was deemed most appropriate.  Using a sterile surgical marker, an appropriate transposition flap was drawn incorporating the defect.    The area thus outlined was incised deep to adipose tissue with a #15 scalpel blade.  The skin margins were undermined to an appropriate distance in all directions utilizing iris scissors.
Muscle Hinge Flap Text: The defect edges were debeveled with a #15 scalpel blade.  Given the size, depth and location of the defect and the proximity to free margins a muscle hinge flap was deemed most appropriate.  Using a sterile surgical marker, an appropriate hinge flap was drawn incorporating the defect. The area thus outlined was incised with a #15 scalpel blade.  The skin margins were undermined to an appropriate distance in all directions utilizing iris scissors.
Mustarde Flap Text: The defect edges were debeveled with a #15 scalpel blade.  Given the size, depth and location of the defect and the proximity to free margins a Mustarde flap was deemed most appropriate.  Using a sterile surgical marker, an appropriate flap was drawn incorporating the defect. The area thus outlined was incised with a #15 scalpel blade.  The skin margins were undermined to an appropriate distance in all directions utilizing iris scissors.
Nasal Turnover Hinge Flap Text: The defect edges were debeveled with a #15 scalpel blade.  Given the size, depth, location of the defect and the defect being full thickness a nasal turnover hinge flap was deemed most appropriate.  Using a sterile surgical marker, an appropriate hinge flap was drawn incorporating the defect. The area thus outlined was incised with a #15 scalpel blade. The flap was designed to recreate the nasal mucosal lining and the alar rim. The skin margins were undermined to an appropriate distance in all directions utilizing iris scissors.
Nasalis-Muscle-Based Myocutaneous Island Pedicle Flap Text: Using a #15 blade, an incision was made around the donor flap to the level of the nasalis muscle. Wide lateral undermining was then performed in both the subcutaneous plane above the nasalis muscle, and in a submuscular plane just above periosteum. This allowed the formation of a free nasalis muscle axial pedicle (based on the angular artery) which was still attached to the actual cutaneous flap, increasing its mobility and vascular viability. Hemostasis was obtained with pinpoint electrocoagulation. The flap was mobilized into position and the pivotal anchor points positioned and stabilized with buried interrupted sutures. Subcutaneous and dermal tissues were closed in a multilayered fashion with sutures. Tissue redundancies were excised, and the epidermal edges were apposed without significant tension and sutured with sutures.
Nasalis Myocutaneous Flap Text: Using a #15 blade, an incision was made around the donor flap to the level of the nasalis muscle. Wide lateral undermining was then performed in both the subcutaneous plane above the nasalis muscle, and in a submuscular plane just above periosteum. This allowed the formation of a free nasalis muscle axial pedicle which was still attached to the actual cutaneous flap, increasing its mobility and vascular viability. Hemostasis was obtained with pinpoint electrocoagulation. The flap was mobilized into position and the pivotal anchor points positioned and stabilized with buried interrupted sutures. Subcutaneous and dermal tissues were closed in a multilayered fashion with sutures. Tissue redundancies were excised, and the epidermal edges were apposed without significant tension and sutured with sutures.
Orbicularis Oris Muscle Flap Text: The defect edges were debeveled with a #15 scalpel blade.  Given that the defect affected the competency of the oral sphincter an orbicularis oris muscle flap was deemed most appropriate to restore this competency and normal muscle function.  Using a sterile surgical marker, an appropriate flap was drawn incorporating the defect. The area thus outlined was incised with a #15 scalpel blade.
Melolabial Transposition Flap Text: The defect edges were debeveled with a #15 scalpel blade.  Given the location of the defect and the proximity to free margins a melolabial flap was deemed most appropriate.  Using a sterile surgical marker, an appropriate melolabial transposition flap was drawn incorporating the defect.    The area thus outlined was incised deep to adipose tissue with a #15 scalpel blade.  The skin margins were undermined to an appropriate distance in all directions utilizing iris scissors.
Rectangular Flap Text: The defect edges were debeveled with a #15 scalpel blade. Given the location of the defect and the proximity to free margins a rectangular flap was deemed most appropriate. Using a sterile surgical marker, an appropriate rectangular flap was drawn incorporating the defect. The area thus outlined was incised deep to adipose tissue with a #15 scalpel blade. The skin margins were undermined to an appropriate distance in all directions utilizing iris scissors. Following this, the designed flap was carried over into the primary defect and sutured into place.
Rhombic Flap Text: The defect edges were debeveled with a #15 scalpel blade.  Given the location of the defect and the proximity to free margins a rhombic flap was deemed most appropriate.  Using a sterile surgical marker, an appropriate rhombic flap was drawn incorporating the defect.    The area thus outlined was incised deep to adipose tissue with a #15 scalpel blade.  The skin margins were undermined to an appropriate distance in all directions utilizing iris scissors.
Rhomboid Transposition Flap Text: The defect edges were debeveled with a #15 scalpel blade.  Given the location of the defect and the proximity to free margins a rhomboid transposition flap was deemed most appropriate.  Using a sterile surgical marker, an appropriate rhomboid flap was drawn incorporating the defect.    The area thus outlined was incised deep to adipose tissue with a #15 scalpel blade.  The skin margins were undermined to an appropriate distance in all directions utilizing iris scissors.
Bi-Rhombic Flap Text: The defect edges were debeveled with a #15 scalpel blade.  Given the location of the defect and the proximity to free margins a bi-rhombic flap was deemed most appropriate.  Using a sterile surgical marker, an appropriate rhombic flap was drawn incorporating the defect. The area thus outlined was incised deep to adipose tissue with a #15 scalpel blade.  The skin margins were undermined to an appropriate distance in all directions utilizing iris scissors.
Helical Rim Advancement Flap Text: The defect edges were debeveled with a #15 blade scalpel.  Given the location of the defect and the proximity to free margins (helical rim) a double helical rim advancement flap was deemed most appropriate.  Using a sterile surgical marker, the appropriate advancement flaps were drawn incorporating the defect and placing the expected incisions between the helical rim and antihelix where possible.  The area thus outlined was incised through and through with a #15 scalpel blade.  With a skin hook and iris scissors, the flaps were gently and sharply undermined and freed up.
Bilateral Helical Rim Advancement Flap Text: The defect edges were debeveled with a #15 blade scalpel.  Given the location of the defect and the proximity to free margins (helical rim) a bilateral helical rim advancement flap was deemed most appropriate.  Using a sterile surgical marker, the appropriate advancement flaps were drawn incorporating the defect and placing the expected incisions between the helical rim and antihelix where possible.  The area thus outlined was incised through and through with a #15 scalpel blade.  With a skin hook and iris scissors, the flaps were gently and sharply undermined and freed up.
Ear Star Wedge Flap Text: The defect edges were debeveled with a #15 blade scalpel.  Given the location of the defect and the proximity to free margins (helical rim) an ear star wedge flap was deemed most appropriate.  Using a sterile surgical marker, the appropriate flap was drawn incorporating the defect and placing the expected incisions between the helical rim and antihelix where possible.  The area thus outlined was incised through and through with a #15 scalpel blade.
Banner Transposition Flap Text: The defect edges were debeveled with a #15 scalpel blade.  Given the location of the defect and the proximity to free margins a Banner transposition flap was deemed most appropriate.  Using a sterile surgical marker, an appropriate flap drawn around the defect. The area thus outlined was incised deep to adipose tissue with a #15 scalpel blade.  The skin margins were undermined to an appropriate distance in all directions utilizing iris scissors.
Bilobed Flap Text: The defect edges were debeveled with a #15 scalpel blade.  Given the location of the defect and the proximity to free margins a bilobe flap was deemed most appropriate.  Using a sterile surgical marker, an appropriate bilobe flap drawn around the defect.    The area thus outlined was incised deep to adipose tissue with a #15 scalpel blade.  The skin margins were undermined to an appropriate distance in all directions utilizing iris scissors.
Bilobed Transposition Flap Text: The defect edges were debeveled with a #15 scalpel blade.  Given the location of the defect and the proximity to free margins a bilobed transposition flap was deemed most appropriate.  Using a sterile surgical marker, an appropriate bilobe flap drawn around the defect.    The area thus outlined was incised deep to adipose tissue with a #15 scalpel blade.  The skin margins were undermined to an appropriate distance in all directions utilizing iris scissors.
Trilobed Flap Text: The defect edges were debeveled with a #15 scalpel blade.  Given the location of the defect and the proximity to free margins a trilobed flap was deemed most appropriate.  Using a sterile surgical marker, an appropriate trilobed flap drawn around the defect.    The area thus outlined was incised deep to adipose tissue with a #15 scalpel blade.  The skin margins were undermined to an appropriate distance in all directions utilizing iris scissors.
Dorsal Nasal Flap Text: The defect edges were debeveled with a #15 scalpel blade.  Given the location of the defect and the proximity to free margins a dorsal nasal flap was deemed most appropriate.  Using a sterile surgical marker, an appropriate dorsal nasal flap was drawn around the defect.    The area thus outlined was incised deep to adipose tissue with a #15 scalpel blade.  The skin margins were undermined to an appropriate distance in all directions utilizing iris scissors.
Island Pedicle Flap Text: The defect edges were debeveled with a #15 scalpel blade.  Given the location of the defect, shape of the defect and the proximity to free margins an island pedicle advancement flap was deemed most appropriate.  Using a sterile surgical marker, an appropriate advancement flap was drawn incorporating the defect, outlining the appropriate donor tissue and placing the expected incisions within the relaxed skin tension lines where possible.    The area thus outlined was incised deep to adipose tissue with a #15 scalpel blade.  The skin margins were undermined to an appropriate distance in all directions around the primary defect and laterally outward around the island pedicle utilizing iris scissors.  There was minimal undermining beneath the pedicle flap.
Island Pedicle Flap With Canthal Suspension Text: The defect edges were debeveled with a #15 scalpel blade.  Given the location of the defect, shape of the defect and the proximity to free margins an island pedicle advancement flap was deemed most appropriate.  Using a sterile surgical marker, an appropriate advancement flap was drawn incorporating the defect, outlining the appropriate donor tissue and placing the expected incisions within the relaxed skin tension lines where possible. The area thus outlined was incised deep to adipose tissue with a #15 scalpel blade.  The skin margins were undermined to an appropriate distance in all directions around the primary defect and laterally outward around the island pedicle utilizing iris scissors.  There was minimal undermining beneath the pedicle flap. A suspension suture was placed in the canthal tendon to prevent tension and prevent ectropion.
Alar Island Pedicle Flap Text: The defect edges were debeveled with a #15 scalpel blade.  Given the location of the defect, shape of the defect and the proximity to the alar rim an island pedicle advancement flap was deemed most appropriate.  Using a sterile surgical marker, an appropriate advancement flap was drawn incorporating the defect, outlining the appropriate donor tissue and placing the expected incisions within the nasal ala running parallel to the alar rim. The area thus outlined was incised with a #15 scalpel blade.  The skin margins were undermined minimally to an appropriate distance in all directions around the primary defect and laterally outward around the island pedicle utilizing iris scissors.  There was minimal undermining beneath the pedicle flap.
Double Island Pedicle Flap Text: The defect edges were debeveled with a #15 scalpel blade.  Given the location of the defect, shape of the defect and the proximity to free margins a double island pedicle advancement flap was deemed most appropriate.  Using a sterile surgical marker, an appropriate advancement flap was drawn incorporating the defect, outlining the appropriate donor tissue and placing the expected incisions within the relaxed skin tension lines where possible.    The area thus outlined was incised deep to adipose tissue with a #15 scalpel blade.  The skin margins were undermined to an appropriate distance in all directions around the primary defect and laterally outward around the island pedicle utilizing iris scissors.  There was minimal undermining beneath the pedicle flap.
Island Pedicle Flap-Requiring Vessel Identification Text: The defect edges were debeveled with a #15 scalpel blade.  Given the location of the defect, shape of the defect and the proximity to free margins an island pedicle advancement flap was deemed most appropriate.  Using a sterile surgical marker, an appropriate advancement flap was drawn, based on the axial vessel mentioned above, incorporating the defect, outlining the appropriate donor tissue and placing the expected incisions within the relaxed skin tension lines where possible.    The area thus outlined was incised deep to adipose tissue with a #15 scalpel blade.  The skin margins were undermined to an appropriate distance in all directions around the primary defect and laterally outward around the island pedicle utilizing iris scissors.  There was minimal undermining beneath the pedicle flap.
Keystone Flap Text: The defect edges were debeveled with a #15 scalpel blade.  Given the location of the defect, shape of the defect a keystone flap was deemed most appropriate.  Using a sterile surgical marker, an appropriate keystone flap was drawn incorporating the defect, outlining the appropriate donor tissue and placing the expected incisions within the relaxed skin tension lines where possible. The area thus outlined was incised deep to adipose tissue with a #15 scalpel blade.  The skin margins were undermined to an appropriate distance in all directions around the primary defect and laterally outward around the flap utilizing iris scissors.
O-T Plasty Text: The defect edges were debeveled with a #15 scalpel blade.  Given the location of the defect, shape of the defect and the proximity to free margins an O-T plasty was deemed most appropriate.  Using a sterile surgical marker, an appropriate O-T plasty was drawn incorporating the defect and placing the expected incisions within the relaxed skin tension lines where possible.    The area thus outlined was incised deep to adipose tissue with a #15 scalpel blade.  The skin margins were undermined to an appropriate distance in all directions utilizing iris scissors.
O-Z Plasty Text: The defect edges were debeveled with a #15 scalpel blade.  Given the location of the defect, shape of the defect and the proximity to free margins an O-Z plasty (double transposition flap) was deemed most appropriate.  Using a sterile surgical marker, the appropriate transposition flaps were drawn incorporating the defect and placing the expected incisions within the relaxed skin tension lines where possible.    The area thus outlined was incised deep to adipose tissue with a #15 scalpel blade.  The skin margins were undermined to an appropriate distance in all directions utilizing iris scissors.  Hemostasis was achieved with electrocautery.  The flaps were then transposed into place, one clockwise and the other counterclockwise, and anchored with interrupted buried subcutaneous sutures.
Double O-Z Plasty Text: The defect edges were debeveled with a #15 scalpel blade.  Given the location of the defect, shape of the defect and the proximity to free margins a Double O-Z plasty (double transposition flap) was deemed most appropriate.  Using a sterile surgical marker, the appropriate transposition flaps were drawn incorporating the defect and placing the expected incisions within the relaxed skin tension lines where possible. The area thus outlined was incised deep to adipose tissue with a #15 scalpel blade.  The skin margins were undermined to an appropriate distance in all directions utilizing iris scissors.  Hemostasis was achieved with electrocautery.  The flaps were then transposed into place, one clockwise and the other counterclockwise, and anchored with interrupted buried subcutaneous sutures.
V-Y Plasty Text: The defect edges were debeveled with a #15 scalpel blade.  Given the location of the defect, shape of the defect and the proximity to free margins an V-Y advancement flap was deemed most appropriate.  Using a sterile surgical marker, an appropriate advancement flap was drawn incorporating the defect and placing the expected incisions within the relaxed skin tension lines where possible.    The area thus outlined was incised deep to adipose tissue with a #15 scalpel blade.  The skin margins were undermined to an appropriate distance in all directions utilizing iris scissors.
H Plasty Text: Given the location of the defect, shape of the defect and the proximity to free margins a H-plasty was deemed most appropriate for repair.  Using a sterile surgical marker, the appropriate advancement arms of the H-plasty were drawn incorporating the defect and placing the expected incisions within the relaxed skin tension lines where possible. The area thus outlined was incised deep to adipose tissue with a #15 scalpel blade. The skin margins were undermined to an appropriate distance in all directions utilizing iris scissors.  The opposing advancement arms were then advanced into place in opposite direction and anchored with interrupted buried subcutaneous sutures.
W Plasty Text: The lesion was extirpated to the level of the fat with a #15 scalpel blade.  Given the location of the defect, shape of the defect and the proximity to free margins a W-plasty was deemed most appropriate for repair.  Using a sterile surgical marker, the appropriate transposition arms of the W-plasty were drawn incorporating the defect and placing the expected incisions within the relaxed skin tension lines where possible.    The area thus outlined was incised deep to adipose tissue with a #15 scalpel blade.  The skin margins were undermined to an appropriate distance in all directions utilizing iris scissors.  The opposing transposition arms were then transposed into place in opposite direction and anchored with interrupted buried subcutaneous sutures.
Z Plasty Text: The lesion was extirpated to the level of the fat with a #15 scalpel blade.  Given the location of the defect, shape of the defect and the proximity to free margins a Z-plasty was deemed most appropriate for repair.  Using a sterile surgical marker, the appropriate transposition arms of the Z-plasty were drawn incorporating the defect and placing the expected incisions within the relaxed skin tension lines where possible.    The area thus outlined was incised deep to adipose tissue with a #15 scalpel blade.  The skin margins were undermined to an appropriate distance in all directions utilizing iris scissors.  The opposing transposition arms were then transposed into place in opposite direction and anchored with interrupted buried subcutaneous sutures.
Double Z Plasty Text: The lesion was extirpated to the level of the fat with a #15 scalpel blade. Given the location of the defect, shape of the defect and the proximity to free margins a double Z-plasty was deemed most appropriate for repair. Using a sterile surgical marker, the appropriate transposition arms of the double Z-plasty were drawn incorporating the defect and placing the expected incisions within the relaxed skin tension lines where possible. The area thus outlined was incised deep to adipose tissue with a #15 scalpel blade. The skin margins were undermined to an appropriate distance in all directions utilizing iris scissors. The opposing transposition arms were then transposed and carried over into place in opposite direction and anchored with interrupted buried subcutaneous sutures.
Zygomaticofacial Flap Text: Given the location of the defect, shape of the defect and the proximity to free margins a zygomaticofacial flap was deemed most appropriate for repair.  Using a sterile surgical marker, the appropriate flap was drawn incorporating the defect and placing the expected incisions within the relaxed skin tension lines where possible. The area thus outlined was incised deep to adipose tissue with a #15 scalpel blade with preservation of a vascular pedicle.  The skin margins were undermined to an appropriate distance in all directions utilizing iris scissors.  The flap was then placed into the defect and anchored with interrupted buried subcutaneous sutures.
Cheek Interpolation Flap Text: A decision was made to reconstruct the defect utilizing an interpolation axial flap and a staged reconstruction.  A telfa template was made of the defect.  This telfa template was then used to outline the Cheek Interpolation flap.  The donor area for the pedicle flap was then injected with anesthesia.  The flap was excised through the skin and subcutaneous tissue down to the layer of the underlying musculature.  The interpolation flap was carefully excised within this deep plane to maintain its blood supply.  The edges of the donor site were undermined.   The donor site was closed in a primary fashion.  The pedicle was then rotated into position and sutured.  Once the tube was sutured into place, adequate blood supply was confirmed with blanching and refill.  The pedicle was then wrapped with xeroform gauze and dressed appropriately with a telfa and gauze bandage to ensure continued blood supply and protect the attached pedicle.
Cheek-To-Nose Interpolation Flap Text: A decision was made to reconstruct the defect utilizing an interpolation axial flap and a staged reconstruction.  A telfa template was made of the defect.  This telfa template was then used to outline the Cheek-To-Nose Interpolation flap.  The donor area for the pedicle flap was then injected with anesthesia.  The flap was excised through the skin and subcutaneous tissue down to the layer of the underlying musculature.  The interpolation flap was carefully excised within this deep plane to maintain its blood supply.  The edges of the donor site were undermined.   The donor site was closed in a primary fashion.  The pedicle was then rotated into position and sutured.  Once the tube was sutured into place, adequate blood supply was confirmed with blanching and refill.  The pedicle was then wrapped with xeroform gauze and dressed appropriately with a telfa and gauze bandage to ensure continued blood supply and protect the attached pedicle.
Interpolation Flap Text: A decision was made to reconstruct the defect utilizing an interpolation axial flap and a staged reconstruction.  A telfa template was made of the defect.  This telfa template was then used to outline the interpolation flap.  The donor area for the pedicle flap was then injected with anesthesia.  The flap was excised through the skin and subcutaneous tissue down to the layer of the underlying musculature.  The interpolation flap was carefully excised within this deep plane to maintain its blood supply.  The edges of the donor site were undermined.   The donor site was closed in a primary fashion.  The pedicle was then rotated into position and sutured.  Once the tube was sutured into place, adequate blood supply was confirmed with blanching and refill.  The pedicle was then wrapped with xeroform gauze and dressed appropriately with a telfa and gauze bandage to ensure continued blood supply and protect the attached pedicle.
Melolabial Interpolation Flap Text: A decision was made to reconstruct the defect utilizing an interpolation axial flap and a staged reconstruction.  A telfa template was made of the defect.  This telfa template was then used to outline the melolabial interpolation flap.  The donor area for the pedicle flap was then injected with anesthesia.  The flap was excised through the skin and subcutaneous tissue down to the layer of the underlying musculature.  The pedicle flap was carefully excised within this deep plane to maintain its blood supply.  The edges of the donor site were undermined.   The donor site was closed in a primary fashion.  The pedicle was then rotated into position and sutured.  Once the tube was sutured into place, adequate blood supply was confirmed with blanching and refill.  The pedicle was then wrapped with xeroform gauze and dressed appropriately with a telfa and gauze bandage to ensure continued blood supply and protect the attached pedicle.
Mastoid Interpolation Flap Text: A decision was made to reconstruct the defect utilizing an interpolation axial flap and a staged reconstruction.  A telfa template was made of the defect.  This telfa template was then used to outline the mastoid interpolation flap.  The donor area for the pedicle flap was then injected with anesthesia.  The flap was excised through the skin and subcutaneous tissue down to the layer of the underlying musculature.  The pedicle flap was carefully excised within this deep plane to maintain its blood supply.  The edges of the donor site were undermined.   The donor site was closed in a primary fashion.  The pedicle was then rotated into position and sutured.  Once the tube was sutured into place, adequate blood supply was confirmed with blanching and refill.  The pedicle was then wrapped with xeroform gauze and dressed appropriately with a telfa and gauze bandage to ensure continued blood supply and protect the attached pedicle.
Posterior Auricular Interpolation Flap Text: A decision was made to reconstruct the defect utilizing an interpolation axial flap and a staged reconstruction.  A telfa template was made of the defect.  This telfa template was then used to outline the posterior auricular interpolation flap.  The donor area for the pedicle flap was then injected with anesthesia.  The flap was excised through the skin and subcutaneous tissue down to the layer of the underlying musculature.  The pedicle flap was carefully excised within this deep plane to maintain its blood supply.  The edges of the donor site were undermined.   The donor site was closed in a primary fashion.  The pedicle was then rotated into position and sutured.  Once the tube was sutured into place, adequate blood supply was confirmed with blanching and refill.  The pedicle was then wrapped with xeroform gauze and dressed appropriately with a telfa and gauze bandage to ensure continued blood supply and protect the attached pedicle.
Paramedian Forehead Flap Text: A decision was made to reconstruct the defect utilizing an interpolation axial flap and a staged reconstruction.  A telfa template was made of the defect.  This telfa template was then used to outline the paramedian forehead pedicle flap.  The donor area for the pedicle flap was then injected with anesthesia.  The flap was excised through the skin and subcutaneous tissue down to the layer of the underlying musculature.  The pedicle flap was carefully excised within this deep plane to maintain its blood supply.  The edges of the donor site were undermined.   The donor site was closed in a primary fashion.  The pedicle was then rotated into position and sutured.  Once the tube was sutured into place, adequate blood supply was confirmed with blanching and refill.  The pedicle was then wrapped with xeroform gauze and dressed appropriately with a telfa and gauze bandage to ensure continued blood supply and protect the attached pedicle.
Abbe Flap (Upper To Lower Lip) Text: The defect of the lower lip was assessed and measured.  Given the location and size of the defect, an Abbe flap was deemed most appropriate. Using a sterile surgical marker, an appropriate Abbe flap was measured and drawn on the upper lip. Local anesthesia was then infiltrated.  A scalpel was then used to incise the upper lip through and through the skin, vermilion, muscle and mucosa, leaving the flap pedicled on the opposite side.  The flap was then rotated and transferred to the lower lip defect.  The flap was then sutured into place with a three layer technique, closing the orbicularis oris muscle layer with subcutaneous buried sutures, followed by a mucosal layer and an epidermal layer.
Abbe Flap (Lower To Upper Lip) Text: The defect of the upper lip was assessed and measured.  Given the location and size of the defect, an Abbe flap was deemed most appropriate. Using a sterile surgical marker, an appropriate Abbe flap was measured and drawn on the lower lip. Local anesthesia was then infiltrated. A scalpel was then used to incise the upper lip through and through the skin, vermilion, muscle and mucosa, leaving the flap pedicled on the opposite side.  The flap was then rotated and transferred to the lower lip defect.  The flap was then sutured into place with a three layer technique, closing the orbicularis oris muscle layer with subcutaneous buried sutures, followed by a mucosal layer and an epidermal layer.
Estlander Flap (Upper To Lower Lip) Text: The defect of the lower lip was assessed and measured.  Given the location and size of the defect, an Estlander flap was deemed most appropriate. Using a sterile surgical marker, an appropriate Estlander flap was measured and drawn on the upper lip. Local anesthesia was then infiltrated. A scalpel was then used to incise the lateral aspect of the flap, through skin, muscle and mucosa, leaving the flap pedicled medially.  The flap was then rotated and positioned to fill the lower lip defect.  The flap was then sutured into place with a three layer technique, closing the orbicularis oris muscle layer with subcutaneous buried sutures, followed by a mucosal layer and an epidermal layer.
Lip Wedge Excision Repair Text: Given the location of the defect and the proximity to free margins a full thickness wedge repair was deemed most appropriate.  Using a sterile surgical marker, the appropriate repair was drawn incorporating the defect and placing the expected incisions perpendicular to the vermilion border.  The vermilion border was also meticulously outlined to ensure appropriate reapproximation during the repair.  The area thus outlined was incised through and through with a #15 scalpel blade.  The muscularis and dermis were reaproximated with deep sutures following hemostasis. Care was taken to realign the vermilion border before proceeding with the superficial closure.  Once the vermilion was realigned the superfical and mucosal closure was finished.
Ftsg Text: The defect edges were debeveled with a #15 scalpel blade.  Given the location of the defect, shape of the defect and the proximity to free margins a full thickness skin graft was deemed most appropriate.  Using a sterile surgical marker, the primary defect shape was transferred to the donor site. The area thus outlined was incised deep to adipose tissue with a #15 scalpel blade.  The harvested graft was then trimmed of adipose tissue until only dermis and epidermis was left.  The skin margins of the secondary defect were undermined to an appropriate distance in all directions utilizing iris scissors.  The secondary defect was closed with interrupted buried subcutaneous sutures.  The skin edges were then re-apposed with running  sutures.  The skin graft was then placed in the primary defect and oriented appropriately.
Split-Thickness Skin Graft Text: The defect edges were debeveled with a #15 scalpel blade.  Given the location of the defect, shape of the defect and the proximity to free margins a split thickness skin graft was deemed most appropriate.  Using a sterile surgical marker, the primary defect shape was transferred to the donor site. The split thickness graft was then harvested.  The skin graft was then placed in the primary defect and oriented appropriately.
Pinch Graft Text: The defect edges were debeveled with a #15 scalpel blade. Given the location of the defect, shape of the defect and the proximity to free margins a pinch graft was deemed most appropriate. Using a sterile surgical marker, the primary defect shape was transferred to the donor site. The area thus outlined was incised deep to adipose tissue with a #15 scalpel blade.  The harvested graft was then trimmed of adipose tissue until only dermis and epidermis was left. The skin margins of the secondary defect were undermined to an appropriate distance in all directions utilizing iris scissors.  The secondary defect was closed with interrupted buried subcutaneous sutures.  The skin edges were then re-apposed with running  sutures.  The skin graft was then placed in the primary defect and oriented appropriately.
Burow's Graft Text: The defect edges were debeveled with a #15 scalpel blade.  Given the location of the defect, shape of the defect, the proximity to free margins and the presence of a standing cone deformity a Burow's skin graft was deemed most appropriate. The standing cone was removed and this tissue was then trimmed to the shape of the primary defect. The adipose tissue was also removed until only dermis and epidermis were left.  The skin margins of the secondary defect were undermined to an appropriate distance in all directions utilizing iris scissors.  The secondary defect was closed with interrupted buried subcutaneous sutures.  The skin edges were then re-apposed with running  sutures.  The skin graft was then placed in the primary defect and oriented appropriately.
Cartilage Graft Text: The defect edges were debeveled with a #15 scalpel blade.  Given the location of the defect, shape of the defect, the fact the defect involved a full thickness cartilage defect a cartilage graft was deemed most appropriate.  An appropriate donor site was identified, cleansed, and anesthetized. The cartilage graft was then harvested and transferred to the recipient site, oriented appropriately and then sutured into place.  The secondary defect was then repaired using a primary closure.
Composite Graft Text: The defect edges were debeveled with a #15 scalpel blade.  Given the location of the defect, shape of the defect, the proximity to free margins and the fact the defect was full thickness a composite graft was deemed most appropriate.  The defect was outline and then transferred to the donor site.  A full thickness graft was then excised from the donor site. The graft was then placed in the primary defect, oriented appropriately and then sutured into place.  The secondary defect was then repaired using a primary closure.
Epidermal Autograft Text: The defect edges were debeveled with a #15 scalpel blade.  Given the location of the defect, shape of the defect and the proximity to free margins an epidermal autograft was deemed most appropriate.  Using a sterile surgical marker, the primary defect shape was transferred to the donor site. The epidermal graft was then harvested.  The skin graft was then placed in the primary defect and oriented appropriately.
Dermal Autograft Text: The defect edges were debeveled with a #15 scalpel blade.  Given the location of the defect, shape of the defect and the proximity to free margins a dermal autograft was deemed most appropriate.  Using a sterile surgical marker, the primary defect shape was transferred to the donor site. The area thus outlined was incised deep to adipose tissue with a #15 scalpel blade.  The harvested graft was then trimmed of adipose and epidermal tissue until only dermis was left.  The skin graft was then placed in the primary defect and oriented appropriately.
Skin Substitute Text: The defect edges were debeveled with a #15 scalpel blade.  Given the location of the defect, shape of the defect and the proximity to free margins a skin substitute graft was deemed most appropriate.  The graft material was trimmed to fit the size of the defect. The graft was then placed in the primary defect and oriented appropriately.
Tissue Cultured Epidermal Autograft Text: The defect edges were debeveled with a #15 scalpel blade.  Given the location of the defect, shape of the defect and the proximity to free margins a tissue cultured epidermal autograft was deemed most appropriate.  The graft was then trimmed to fit the size of the defect.  The graft was then placed in the primary defect and oriented appropriately.
Xenograft Text: The defect edges were debeveled with a #15 scalpel blade.  Given the location of the defect, shape of the defect and the proximity to free margins a xenograft was deemed most appropriate.  The graft was then trimmed to fit the size of the defect.  The graft was then placed in the primary defect and oriented appropriately.
Purse String (Intermediate) Text: Given the location of the defect and the characteristics of the surrounding skin a purse string intermediate closure was deemed most appropriate.  Undermining was performed circumfirentially around the surgical defect.  A purse string suture was then placed and tightened.
Purse String (Simple) Text: Given the location of the defect and the characteristics of the surrounding skin a purse string simple closure was deemed most appropriate.  Undermining was performed circumferentially around the surgical defect.  A purse string suture was then placed and tightened.
Partial Purse String (Intermediate) Text: Given the location of the defect and the characteristics of the surrounding skin an intermediate purse string closure was deemed most appropriate.  Undermining was performed circumferentially around the surgical defect.  A purse string suture was then placed and tightened. Wound tension of the circular defect prevented complete closure of the wound.
Partial Purse String (Simple) Text: Given the location of the defect and the characteristics of the surrounding skin a simple purse string closure was deemed most appropriate.  Undermining was performed circumferentially around the surgical defect.  A purse string suture was then placed and tightened. Wound tension of the circular defect prevented complete closure of the wound.
Complex Repair And Single Advancement Flap Text: The defect edges were debeveled with a #15 scalpel blade.  The primary defect was closed partially with a complex linear closure.  Given the location of the remaining defect, shape of the defect and the proximity to free margins a single advancement flap was deemed most appropriate for complete closure of the defect.  Using a sterile surgical marker, an appropriate advancement flap was drawn incorporating the defect and placing the expected incisions within the relaxed skin tension lines where possible.    The area thus outlined was incised deep to adipose tissue with a #15 scalpel blade.  The skin margins were undermined to an appropriate distance in all directions utilizing iris scissors.
Complex Repair And Double Advancement Flap Text: The defect edges were debeveled with a #15 scalpel blade.  The primary defect was closed partially with a complex linear closure.  Given the location of the remaining defect, shape of the defect and the proximity to free margins a double advancement flap was deemed most appropriate for complete closure of the defect.  Using a sterile surgical marker, an appropriate advancement flap was drawn incorporating the defect and placing the expected incisions within the relaxed skin tension lines where possible.    The area thus outlined was incised deep to adipose tissue with a #15 scalpel blade.  The skin margins were undermined to an appropriate distance in all directions utilizing iris scissors.
Complex Repair And Modified Advancement Flap Text: The defect edges were debeveled with a #15 scalpel blade.  The primary defect was closed partially with a complex linear closure.  Given the location of the remaining defect, shape of the defect and the proximity to free margins a modified advancement flap was deemed most appropriate for complete closure of the defect.  Using a sterile surgical marker, an appropriate advancement flap was drawn incorporating the defect and placing the expected incisions within the relaxed skin tension lines where possible.    The area thus outlined was incised deep to adipose tissue with a #15 scalpel blade.  The skin margins were undermined to an appropriate distance in all directions utilizing iris scissors.
Complex Repair And A-T Advancement Flap Text: The defect edges were debeveled with a #15 scalpel blade.  The primary defect was closed partially with a complex linear closure.  Given the location of the remaining defect, shape of the defect and the proximity to free margins an A-T advancement flap was deemed most appropriate for complete closure of the defect.  Using a sterile surgical marker, an appropriate advancement flap was drawn incorporating the defect and placing the expected incisions within the relaxed skin tension lines where possible.    The area thus outlined was incised deep to adipose tissue with a #15 scalpel blade.  The skin margins were undermined to an appropriate distance in all directions utilizing iris scissors.
Complex Repair And O-T Advancement Flap Text: The defect edges were debeveled with a #15 scalpel blade.  The primary defect was closed partially with a complex linear closure.  Given the location of the remaining defect, shape of the defect and the proximity to free margins an O-T advancement flap was deemed most appropriate for complete closure of the defect.  Using a sterile surgical marker, an appropriate advancement flap was drawn incorporating the defect and placing the expected incisions within the relaxed skin tension lines where possible.    The area thus outlined was incised deep to adipose tissue with a #15 scalpel blade.  The skin margins were undermined to an appropriate distance in all directions utilizing iris scissors.
Complex Repair And O-L Flap Text: The defect edges were debeveled with a #15 scalpel blade.  The primary defect was closed partially with a complex linear closure.  Given the location of the remaining defect, shape of the defect and the proximity to free margins an O-L flap was deemed most appropriate for complete closure of the defect.  Using a sterile surgical marker, an appropriate flap was drawn incorporating the defect and placing the expected incisions within the relaxed skin tension lines where possible.    The area thus outlined was incised deep to adipose tissue with a #15 scalpel blade.  The skin margins were undermined to an appropriate distance in all directions utilizing iris scissors.
Complex Repair And Bilobe Flap Text: The defect edges were debeveled with a #15 scalpel blade.  The primary defect was closed partially with a complex linear closure.  Given the location of the remaining defect, shape of the defect and the proximity to free margins a bilobe flap was deemed most appropriate for complete closure of the defect.  Using a sterile surgical marker, an appropriate advancement flap was drawn incorporating the defect and placing the expected incisions within the relaxed skin tension lines where possible.    The area thus outlined was incised deep to adipose tissue with a #15 scalpel blade.  The skin margins were undermined to an appropriate distance in all directions utilizing iris scissors.
Complex Repair And Melolabial Flap Text: The defect edges were debeveled with a #15 scalpel blade.  The primary defect was closed partially with a complex linear closure.  Given the location of the remaining defect, shape of the defect and the proximity to free margins a melolabial flap was deemed most appropriate for complete closure of the defect.  Using a sterile surgical marker, an appropriate advancement flap was drawn incorporating the defect and placing the expected incisions within the relaxed skin tension lines where possible.    The area thus outlined was incised deep to adipose tissue with a #15 scalpel blade.  The skin margins were undermined to an appropriate distance in all directions utilizing iris scissors.
Complex Repair And Rotation Flap Text: The defect edges were debeveled with a #15 scalpel blade.  The primary defect was closed partially with a complex linear closure.  Given the location of the remaining defect, shape of the defect and the proximity to free margins a rotation flap was deemed most appropriate for complete closure of the defect.  Using a sterile surgical marker, an appropriate advancement flap was drawn incorporating the defect and placing the expected incisions within the relaxed skin tension lines where possible.    The area thus outlined was incised deep to adipose tissue with a #15 scalpel blade.  The skin margins were undermined to an appropriate distance in all directions utilizing iris scissors.
Complex Repair And Rhombic Flap Text: The defect edges were debeveled with a #15 scalpel blade.  The primary defect was closed partially with a complex linear closure.  Given the location of the remaining defect, shape of the defect and the proximity to free margins a rhombic flap was deemed most appropriate for complete closure of the defect.  Using a sterile surgical marker, an appropriate advancement flap was drawn incorporating the defect and placing the expected incisions within the relaxed skin tension lines where possible.    The area thus outlined was incised deep to adipose tissue with a #15 scalpel blade.  The skin margins were undermined to an appropriate distance in all directions utilizing iris scissors.
Complex Repair And Transposition Flap Text: The defect edges were debeveled with a #15 scalpel blade.  The primary defect was closed partially with a complex linear closure.  Given the location of the remaining defect, shape of the defect and the proximity to free margins a transposition flap was deemed most appropriate for complete closure of the defect.  Using a sterile surgical marker, an appropriate advancement flap was drawn incorporating the defect and placing the expected incisions within the relaxed skin tension lines where possible.    The area thus outlined was incised deep to adipose tissue with a #15 scalpel blade.  The skin margins were undermined to an appropriate distance in all directions utilizing iris scissors.
Complex Repair And V-Y Plasty Text: The defect edges were debeveled with a #15 scalpel blade.  The primary defect was closed partially with a complex linear closure.  Given the location of the remaining defect, shape of the defect and the proximity to free margins a V-Y plasty was deemed most appropriate for complete closure of the defect.  Using a sterile surgical marker, an appropriate advancement flap was drawn incorporating the defect and placing the expected incisions within the relaxed skin tension lines where possible.    The area thus outlined was incised deep to adipose tissue with a #15 scalpel blade.  The skin margins were undermined to an appropriate distance in all directions utilizing iris scissors.
Complex Repair And M Plasty Text: The defect edges were debeveled with a #15 scalpel blade.  The primary defect was closed partially with a complex linear closure.  Given the location of the remaining defect, shape of the defect and the proximity to free margins an M plasty was deemed most appropriate for complete closure of the defect.  Using a sterile surgical marker, an appropriate advancement flap was drawn incorporating the defect and placing the expected incisions within the relaxed skin tension lines where possible.    The area thus outlined was incised deep to adipose tissue with a #15 scalpel blade.  The skin margins were undermined to an appropriate distance in all directions utilizing iris scissors.
Complex Repair And Double M Plasty Text: The defect edges were debeveled with a #15 scalpel blade.  The primary defect was closed partially with a complex linear closure.  Given the location of the remaining defect, shape of the defect and the proximity to free margins a double M plasty was deemed most appropriate for complete closure of the defect.  Using a sterile surgical marker, an appropriate advancement flap was drawn incorporating the defect and placing the expected incisions within the relaxed skin tension lines where possible.    The area thus outlined was incised deep to adipose tissue with a #15 scalpel blade.  The skin margins were undermined to an appropriate distance in all directions utilizing iris scissors.
Complex Repair And W Plasty Text: The defect edges were debeveled with a #15 scalpel blade.  The primary defect was closed partially with a complex linear closure.  Given the location of the remaining defect, shape of the defect and the proximity to free margins a W plasty was deemed most appropriate for complete closure of the defect.  Using a sterile surgical marker, an appropriate advancement flap was drawn incorporating the defect and placing the expected incisions within the relaxed skin tension lines where possible.    The area thus outlined was incised deep to adipose tissue with a #15 scalpel blade.  The skin margins were undermined to an appropriate distance in all directions utilizing iris scissors.
Complex Repair And Z Plasty Text: The defect edges were debeveled with a #15 scalpel blade.  The primary defect was closed partially with a complex linear closure.  Given the location of the remaining defect, shape of the defect and the proximity to free margins a Z plasty was deemed most appropriate for complete closure of the defect.  Using a sterile surgical marker, an appropriate advancement flap was drawn incorporating the defect and placing the expected incisions within the relaxed skin tension lines where possible.    The area thus outlined was incised deep to adipose tissue with a #15 scalpel blade.  The skin margins were undermined to an appropriate distance in all directions utilizing iris scissors.
Complex Repair And Dorsal Nasal Flap Text: The defect edges were debeveled with a #15 scalpel blade.  The primary defect was closed partially with a complex linear closure.  Given the location of the remaining defect, shape of the defect and the proximity to free margins a dorsal nasal flap was deemed most appropriate for complete closure of the defect.  Using a sterile surgical marker, an appropriate flap was drawn incorporating the defect and placing the expected incisions within the relaxed skin tension lines where possible.    The area thus outlined was incised deep to adipose tissue with a #15 scalpel blade.  The skin margins were undermined to an appropriate distance in all directions utilizing iris scissors.
Complex Repair And Ftsg Text: The defect edges were debeveled with a #15 scalpel blade.  The primary defect was closed partially with a complex linear closure.  Given the location of the defect, shape of the defect and the proximity to free margins a full thickness skin graft was deemed most appropriate to repair the remaining defect.  The graft was trimmed to fit the size of the remaining defect.  The graft was then placed in the primary defect, oriented appropriately, and sutured into place.
Complex Repair And Burow's Graft Text: The defect edges were debeveled with a #15 scalpel blade.  The primary defect was closed partially with a complex linear closure.  Given the location of the defect, shape of the defect, the proximity to free margins and the presence of a standing cone deformity a Burow's graft was deemed most appropriate to repair the remaining defect.  The graft was trimmed to fit the size of the remaining defect.  The graft was then placed in the primary defect, oriented appropriately, and sutured into place.
Complex Repair And Split-Thickness Skin Graft Text: The defect edges were debeveled with a #15 scalpel blade.  The primary defect was closed partially with a complex linear closure.  Given the location of the defect, shape of the defect and the proximity to free margins a split thickness skin graft was deemed most appropriate to repair the remaining defect.  The graft was trimmed to fit the size of the remaining defect.  The graft was then placed in the primary defect, oriented appropriately, and sutured into place.
Complex Repair And Epidermal Autograft Text: The defect edges were debeveled with a #15 scalpel blade.  The primary defect was closed partially with a complex linear closure.  Given the location of the defect, shape of the defect and the proximity to free margins an epidermal autograft was deemed most appropriate to repair the remaining defect.  The graft was trimmed to fit the size of the remaining defect.  The graft was then placed in the primary defect, oriented appropriately, and sutured into place.
Complex Repair And Dermal Autograft Text: The defect edges were debeveled with a #15 scalpel blade.  The primary defect was closed partially with a complex linear closure.  Given the location of the defect, shape of the defect and the proximity to free margins an dermal autograft was deemed most appropriate to repair the remaining defect.  The graft was trimmed to fit the size of the remaining defect.  The graft was then placed in the primary defect, oriented appropriately, and sutured into place.
Complex Repair And Tissue Cultured Epidermal Autograft Text: The defect edges were debeveled with a #15 scalpel blade.  The primary defect was closed partially with a complex linear closure.  Given the location of the defect, shape of the defect and the proximity to free margins an tissue cultured epidermal autograft was deemed most appropriate to repair the remaining defect.  The graft was trimmed to fit the size of the remaining defect.  The graft was then placed in the primary defect, oriented appropriately, and sutured into place.
Complex Repair And Xenograft Text: The defect edges were debeveled with a #15 scalpel blade.  The primary defect was closed partially with a complex linear closure.  Given the location of the defect, shape of the defect and the proximity to free margins a xenograft was deemed most appropriate to repair the remaining defect.  The graft was trimmed to fit the size of the remaining defect.  The graft was then placed in the primary defect, oriented appropriately, and sutured into place.
Complex Repair And Skin Substitute Graft Text: The defect edges were debeveled with a #15 scalpel blade.  The primary defect was closed partially with a complex linear closure.  Given the location of the remaining defect, shape of the defect and the proximity to free margins a skin substitute graft was deemed most appropriate to repair the remaining defect.  The graft was trimmed to fit the size of the remaining defect.  The graft was then placed in the primary defect, oriented appropriately, and sutured into place.
Path Notes (To The Dermatopathologist): Please check margins.
Consent was obtained from the patient. The risks and benefits to therapy were discussed in detail. Specifically, the risks of infection, scarring, bleeding, prolonged wound healing, incomplete removal, allergy to anesthesia, nerve injury and recurrence were addressed. Prior to the procedure, the treatment site was clearly identified and confirmed by the patient.
Render Post-Care Instructions In Note?: yes
Post-Care Instructions: I reviewed with the patient in detail post-care instructions. Patient is not to engage in any heavy lifting, exercise, or swimming for the next 14 days. Should the patient develop any fevers, chills, bleeding, severe pain patient will contact the office immediately.
Home Suture Removal Text: Patient was provided a home suture removal kit and will remove their sutures at home.  If they have any questions or difficulties they will call the office.
Where Do You Want The Question To Include Opioid Counseling Located?: Case Summary Tab
Information: Selecting Yes will display possible errors in your note based on the variables you have selected. This validation is only offered as a suggestion for you. PLEASE NOTE THAT THE VALIDATION TEXT WILL BE REMOVED WHEN YOU FINALIZE YOUR NOTE. IF YOU WANT TO FAX A PRELIMINARY NOTE YOU WILL NEED TO TOGGLE THIS TO 'NO' IF YOU DO NOT WANT IT IN YOUR FAXED NOTE.

## 2024-04-24 ENCOUNTER — APPOINTMENT (RX ONLY)
Dept: URBAN - METROPOLITAN AREA CLINIC 329 | Facility: CLINIC | Age: 84
Setting detail: DERMATOLOGY
End: 2024-04-24

## 2024-04-24 DIAGNOSIS — Z48.02 ENCOUNTER FOR REMOVAL OF SUTURES: ICD-10-CM

## 2024-04-24 PROCEDURE — ? SUTURE REMOVAL (GLOBAL PERIOD)

## 2024-04-24 PROCEDURE — 99024 POSTOP FOLLOW-UP VISIT: CPT

## 2024-04-24 ASSESSMENT — LOCATION DETAILED DESCRIPTION DERM: LOCATION DETAILED: LEFT LATERAL ZYGOMA

## 2024-04-24 ASSESSMENT — LOCATION SIMPLE DESCRIPTION DERM: LOCATION SIMPLE: LEFT ZYGOMA

## 2024-04-24 ASSESSMENT — LOCATION ZONE DERM: LOCATION ZONE: FACE

## 2024-04-24 NOTE — PROCEDURE: SUTURE REMOVAL (GLOBAL PERIOD)
Detail Level: Detailed
Add 10589 Cpt? (Important Note: In 2017 The Use Of 28163 Is Being Tracked By Cms To Determine Future Global Period Reimbursement For Global Periods): yes

## 2024-11-04 ENCOUNTER — APPOINTMENT (OUTPATIENT)
Dept: CT IMAGING | Age: 84
End: 2024-11-04
Payer: MEDICARE

## 2024-11-04 ENCOUNTER — HOSPITAL ENCOUNTER (OUTPATIENT)
Age: 84
Setting detail: OBSERVATION
Discharge: HOME HEALTH | End: 2024-11-07
Attending: EMERGENCY MEDICINE | Admitting: FAMILY MEDICINE
Payer: MEDICARE

## 2024-11-04 DIAGNOSIS — I95.1 ORTHOSTATIC HYPOTENSION: Primary | ICD-10-CM

## 2024-11-04 DIAGNOSIS — R42 DIZZINESS AND GIDDINESS: ICD-10-CM

## 2024-11-04 DIAGNOSIS — G45.8 ACUTE POSTERIOR CIRCULATION TRANSIENT ISCHEMIC ATTACK: ICD-10-CM

## 2024-11-04 DIAGNOSIS — R42 DIZZINESS: ICD-10-CM

## 2024-11-04 LAB
ALBUMIN SERPL-MCNC: 3.4 G/DL (ref 3.2–4.6)
ALBUMIN/GLOB SERPL: 1.2 (ref 1–1.9)
ALP SERPL-CCNC: 127 U/L (ref 40–129)
ALT SERPL-CCNC: 36 U/L (ref 8–55)
ANION GAP SERPL CALC-SCNC: 11 MMOL/L (ref 7–16)
AST SERPL-CCNC: 46 U/L (ref 15–37)
BASOPHILS # BLD: 0 K/UL (ref 0–0.2)
BASOPHILS NFR BLD: 1 % (ref 0–2)
BILIRUB SERPL-MCNC: 0.8 MG/DL (ref 0–1.2)
BUN SERPL-MCNC: 18 MG/DL (ref 8–23)
CALCIUM SERPL-MCNC: 9 MG/DL (ref 8.8–10.2)
CHLORIDE SERPL-SCNC: 102 MMOL/L (ref 98–107)
CO2 SERPL-SCNC: 23 MMOL/L (ref 20–29)
CREAT SERPL-MCNC: 1.03 MG/DL (ref 0.8–1.3)
DIFFERENTIAL METHOD BLD: ABNORMAL
EOSINOPHIL # BLD: 0.2 K/UL (ref 0–0.8)
EOSINOPHIL NFR BLD: 3 % (ref 0.5–7.8)
ERYTHROCYTE [DISTWIDTH] IN BLOOD BY AUTOMATED COUNT: 13.3 % (ref 11.9–14.6)
GLOBULIN SER CALC-MCNC: 3 G/DL (ref 2.3–3.5)
GLUCOSE SERPL-MCNC: 233 MG/DL (ref 70–99)
HCT VFR BLD AUTO: 40.2 % (ref 41.1–50.3)
HGB BLD-MCNC: 13.9 G/DL (ref 13.6–17.2)
IMM GRANULOCYTES # BLD AUTO: 0 K/UL (ref 0–0.5)
IMM GRANULOCYTES NFR BLD AUTO: 0 % (ref 0–5)
INR PPP: 1.1
LYMPHOCYTES # BLD: 1 K/UL (ref 0.5–4.6)
LYMPHOCYTES NFR BLD: 15 % (ref 13–44)
MCH RBC QN AUTO: 32.9 PG (ref 26.1–32.9)
MCHC RBC AUTO-ENTMCNC: 34.6 G/DL (ref 31.4–35)
MCV RBC AUTO: 95 FL (ref 82–102)
MONOCYTES # BLD: 0.4 K/UL (ref 0.1–1.3)
MONOCYTES NFR BLD: 6 % (ref 4–12)
NEUTS SEG # BLD: 5.3 K/UL (ref 1.7–8.2)
NEUTS SEG NFR BLD: 76 % (ref 43–78)
NRBC # BLD: 0 K/UL (ref 0–0.2)
PLATELET # BLD AUTO: 101 K/UL (ref 150–450)
PMV BLD AUTO: 9.7 FL (ref 9.4–12.3)
POTASSIUM SERPL-SCNC: 4.2 MMOL/L (ref 3.5–5.1)
PROT SERPL-MCNC: 6.4 G/DL (ref 6.3–8.2)
PROTHROMBIN TIME: 14.4 SEC (ref 11.3–14.9)
RBC # BLD AUTO: 4.23 M/UL (ref 4.23–5.6)
SODIUM SERPL-SCNC: 135 MMOL/L (ref 136–145)
WBC # BLD AUTO: 6.9 K/UL (ref 4.3–11.1)

## 2024-11-04 PROCEDURE — 99285 EMERGENCY DEPT VISIT HI MDM: CPT

## 2024-11-04 PROCEDURE — 80053 COMPREHEN METABOLIC PANEL: CPT

## 2024-11-04 PROCEDURE — 6360000004 HC RX CONTRAST MEDICATION: Performed by: EMERGENCY MEDICINE

## 2024-11-04 PROCEDURE — 93005 ELECTROCARDIOGRAM TRACING: CPT | Performed by: EMERGENCY MEDICINE

## 2024-11-04 PROCEDURE — 70498 CT ANGIOGRAPHY NECK: CPT

## 2024-11-04 PROCEDURE — 70450 CT HEAD/BRAIN W/O DYE: CPT

## 2024-11-04 PROCEDURE — 85610 PROTHROMBIN TIME: CPT

## 2024-11-04 PROCEDURE — 85025 COMPLETE CBC W/AUTO DIFF WBC: CPT

## 2024-11-04 PROCEDURE — 6370000000 HC RX 637 (ALT 250 FOR IP): Performed by: EMERGENCY MEDICINE

## 2024-11-04 RX ORDER — GABAPENTIN 300 MG/1
300 CAPSULE ORAL
Status: COMPLETED | OUTPATIENT
Start: 2024-11-04 | End: 2024-11-04

## 2024-11-04 RX ORDER — ASPIRIN 81 MG/1
81 TABLET, CHEWABLE ORAL
Status: COMPLETED | OUTPATIENT
Start: 2024-11-04 | End: 2024-11-04

## 2024-11-04 RX ORDER — IOPAMIDOL 755 MG/ML
100 INJECTION, SOLUTION INTRAVASCULAR
Status: COMPLETED | OUTPATIENT
Start: 2024-11-04 | End: 2024-11-04

## 2024-11-04 RX ADMIN — IOPAMIDOL 100 ML: 755 INJECTION, SOLUTION INTRAVENOUS at 22:08

## 2024-11-04 RX ADMIN — ASPIRIN 81 MG: 81 TABLET, CHEWABLE ORAL at 23:26

## 2024-11-04 RX ADMIN — GABAPENTIN 300 MG: 300 CAPSULE ORAL at 23:26

## 2024-11-04 ASSESSMENT — ENCOUNTER SYMPTOMS
ABDOMINAL PAIN: 0
RHINORRHEA: 0
NAUSEA: 1
DIARRHEA: 0
VOMITING: 1
COUGH: 0
SHORTNESS OF BREATH: 0
BACK PAIN: 0
COLOR CHANGE: 0

## 2024-11-04 ASSESSMENT — PAIN - FUNCTIONAL ASSESSMENT: PAIN_FUNCTIONAL_ASSESSMENT: NONE - DENIES PAIN

## 2024-11-05 ENCOUNTER — APPOINTMENT (OUTPATIENT)
Dept: MRI IMAGING | Age: 84
End: 2024-11-05
Payer: MEDICARE

## 2024-11-05 PROBLEM — R42 DIZZINESS: Status: ACTIVE | Noted: 2024-11-05

## 2024-11-05 LAB
ANION GAP SERPL CALC-SCNC: 10 MMOL/L (ref 7–16)
BUN SERPL-MCNC: 17 MG/DL (ref 8–23)
CALCIUM SERPL-MCNC: 9 MG/DL (ref 8.8–10.2)
CHLORIDE SERPL-SCNC: 103 MMOL/L (ref 98–107)
CHOLEST SERPL-MCNC: 116 MG/DL (ref 0–200)
CO2 SERPL-SCNC: 25 MMOL/L (ref 20–29)
CREAT SERPL-MCNC: 1.01 MG/DL (ref 0.8–1.3)
EKG ATRIAL RATE: 53 BPM
EKG DIAGNOSIS: NORMAL
EKG P AXIS: 61 DEGREES
EKG P-R INTERVAL: 174 MS
EKG Q-T INTERVAL: 445 MS
EKG QRS DURATION: 99 MS
EKG QTC CALCULATION (BAZETT): 418 MS
EKG R AXIS: 70 DEGREES
EKG T AXIS: 72 DEGREES
EKG VENTRICULAR RATE: 53 BPM
ERYTHROCYTE [DISTWIDTH] IN BLOOD BY AUTOMATED COUNT: 13.2 % (ref 11.9–14.6)
EST. AVERAGE GLUCOSE BLD GHB EST-MCNC: 216 MG/DL
GLUCOSE BLD STRIP.AUTO-MCNC: 225 MG/DL (ref 65–100)
GLUCOSE BLD STRIP.AUTO-MCNC: 267 MG/DL (ref 65–100)
GLUCOSE BLD STRIP.AUTO-MCNC: 283 MG/DL (ref 65–100)
GLUCOSE BLD STRIP.AUTO-MCNC: 288 MG/DL (ref 65–100)
GLUCOSE BLD STRIP.AUTO-MCNC: 292 MG/DL (ref 65–100)
GLUCOSE SERPL-MCNC: 283 MG/DL (ref 70–99)
HBA1C MFR BLD: 9.2 % (ref 0–5.6)
HCT VFR BLD AUTO: 40.1 % (ref 41.1–50.3)
HDLC SERPL-MCNC: 50 MG/DL (ref 40–60)
HDLC SERPL: 2.3 (ref 0–5)
HGB BLD-MCNC: 13.5 G/DL (ref 13.6–17.2)
LDLC SERPL CALC-MCNC: 50 MG/DL (ref 0–100)
MCH RBC QN AUTO: 32.4 PG (ref 26.1–32.9)
MCHC RBC AUTO-ENTMCNC: 33.7 G/DL (ref 31.4–35)
MCV RBC AUTO: 96.2 FL (ref 82–102)
NRBC # BLD: 0 K/UL (ref 0–0.2)
PLATELET # BLD AUTO: 92 K/UL (ref 150–450)
PMV BLD AUTO: 9.9 FL (ref 9.4–12.3)
POTASSIUM SERPL-SCNC: 4.3 MMOL/L (ref 3.5–5.1)
RBC # BLD AUTO: 4.17 M/UL (ref 4.23–5.6)
SERVICE CMNT-IMP: ABNORMAL
SODIUM SERPL-SCNC: 137 MMOL/L (ref 136–145)
TRIGL SERPL-MCNC: 80 MG/DL (ref 0–150)
VLDLC SERPL CALC-MCNC: 16 MG/DL (ref 6–23)
WBC # BLD AUTO: 5.5 K/UL (ref 4.3–11.1)

## 2024-11-05 PROCEDURE — G0378 HOSPITAL OBSERVATION PER HR: HCPCS

## 2024-11-05 PROCEDURE — 6370000000 HC RX 637 (ALT 250 FOR IP): Performed by: FAMILY MEDICINE

## 2024-11-05 PROCEDURE — 96374 THER/PROPH/DIAG INJ IV PUSH: CPT

## 2024-11-05 PROCEDURE — 70551 MRI BRAIN STEM W/O DYE: CPT

## 2024-11-05 PROCEDURE — 97535 SELF CARE MNGMENT TRAINING: CPT

## 2024-11-05 PROCEDURE — 2580000003 HC RX 258: Performed by: FAMILY MEDICINE

## 2024-11-05 PROCEDURE — 85027 COMPLETE CBC AUTOMATED: CPT

## 2024-11-05 PROCEDURE — 6370000000 HC RX 637 (ALT 250 FOR IP): Performed by: HOSPITALIST

## 2024-11-05 PROCEDURE — 83036 HEMOGLOBIN GLYCOSYLATED A1C: CPT

## 2024-11-05 PROCEDURE — 36415 COLL VENOUS BLD VENIPUNCTURE: CPT

## 2024-11-05 PROCEDURE — 80048 BASIC METABOLIC PNL TOTAL CA: CPT

## 2024-11-05 PROCEDURE — 2500000003 HC RX 250 WO HCPCS: Performed by: FAMILY MEDICINE

## 2024-11-05 PROCEDURE — 97530 THERAPEUTIC ACTIVITIES: CPT

## 2024-11-05 PROCEDURE — 82962 GLUCOSE BLOOD TEST: CPT

## 2024-11-05 PROCEDURE — 97165 OT EVAL LOW COMPLEX 30 MIN: CPT

## 2024-11-05 PROCEDURE — 92610 EVALUATE SWALLOWING FUNCTION: CPT

## 2024-11-05 PROCEDURE — 97112 NEUROMUSCULAR REEDUCATION: CPT

## 2024-11-05 PROCEDURE — 97161 PT EVAL LOW COMPLEX 20 MIN: CPT

## 2024-11-05 PROCEDURE — 93010 ELECTROCARDIOGRAM REPORT: CPT | Performed by: INTERNAL MEDICINE

## 2024-11-05 PROCEDURE — 80061 LIPID PANEL: CPT

## 2024-11-05 RX ORDER — SODIUM CHLORIDE 9 MG/ML
INJECTION, SOLUTION INTRAVENOUS PRN
Status: DISCONTINUED | OUTPATIENT
Start: 2024-11-05 | End: 2024-11-07 | Stop reason: HOSPADM

## 2024-11-05 RX ORDER — INSULIN GLARGINE 100 [IU]/ML
0.25 INJECTION, SOLUTION SUBCUTANEOUS NIGHTLY
Status: DISCONTINUED | OUTPATIENT
Start: 2024-11-05 | End: 2024-11-07 | Stop reason: HOSPADM

## 2024-11-05 RX ORDER — LORAZEPAM 0.5 MG/1
0.5 TABLET ORAL
Status: COMPLETED | OUTPATIENT
Start: 2024-11-05 | End: 2024-11-05

## 2024-11-05 RX ORDER — LABETALOL HYDROCHLORIDE 5 MG/ML
10 INJECTION, SOLUTION INTRAVENOUS EVERY 10 MIN PRN
Status: DISCONTINUED | OUTPATIENT
Start: 2024-11-05 | End: 2024-11-07 | Stop reason: HOSPADM

## 2024-11-05 RX ORDER — MECLIZINE HYDROCHLORIDE 25 MG/1
25 TABLET ORAL 3 TIMES DAILY PRN
Status: DISCONTINUED | OUTPATIENT
Start: 2024-11-05 | End: 2024-11-07 | Stop reason: HOSPADM

## 2024-11-05 RX ORDER — LEVOTHYROXINE SODIUM 50 UG/1
100 TABLET ORAL
Status: DISCONTINUED | OUTPATIENT
Start: 2024-11-05 | End: 2024-11-07 | Stop reason: HOSPADM

## 2024-11-05 RX ORDER — INSULIN LISPRO 100 [IU]/ML
0-8 INJECTION, SOLUTION INTRAVENOUS; SUBCUTANEOUS
Status: DISCONTINUED | OUTPATIENT
Start: 2024-11-05 | End: 2024-11-07 | Stop reason: HOSPADM

## 2024-11-05 RX ORDER — METFORMIN HYDROCHLORIDE 500 MG/1
500 TABLET, EXTENDED RELEASE ORAL 2 TIMES DAILY WITH MEALS
Status: DISCONTINUED | OUTPATIENT
Start: 2024-11-05 | End: 2024-11-06

## 2024-11-05 RX ORDER — ONDANSETRON 4 MG/1
4 TABLET, ORALLY DISINTEGRATING ORAL EVERY 8 HOURS PRN
Status: DISCONTINUED | OUTPATIENT
Start: 2024-11-05 | End: 2024-11-07 | Stop reason: HOSPADM

## 2024-11-05 RX ORDER — SODIUM CHLORIDE 0.9 % (FLUSH) 0.9 %
5-40 SYRINGE (ML) INJECTION EVERY 12 HOURS SCHEDULED
Status: DISCONTINUED | OUTPATIENT
Start: 2024-11-05 | End: 2024-11-07 | Stop reason: HOSPADM

## 2024-11-05 RX ORDER — ONDANSETRON 2 MG/ML
4 INJECTION INTRAMUSCULAR; INTRAVENOUS EVERY 6 HOURS PRN
Status: DISCONTINUED | OUTPATIENT
Start: 2024-11-05 | End: 2024-11-07 | Stop reason: HOSPADM

## 2024-11-05 RX ORDER — IBUPROFEN 600 MG/1
1 TABLET ORAL PRN
Status: DISCONTINUED | OUTPATIENT
Start: 2024-11-05 | End: 2024-11-07 | Stop reason: HOSPADM

## 2024-11-05 RX ORDER — ASPIRIN 300 MG/1
300 SUPPOSITORY RECTAL DAILY
Status: DISCONTINUED | OUTPATIENT
Start: 2024-11-05 | End: 2024-11-07 | Stop reason: HOSPADM

## 2024-11-05 RX ORDER — POLYETHYLENE GLYCOL 3350 17 G/17G
17 POWDER, FOR SOLUTION ORAL DAILY PRN
Status: DISCONTINUED | OUTPATIENT
Start: 2024-11-05 | End: 2024-11-07 | Stop reason: HOSPADM

## 2024-11-05 RX ORDER — PRAVASTATIN SODIUM 20 MG
40 TABLET ORAL NIGHTLY
Status: DISCONTINUED | OUTPATIENT
Start: 2024-11-05 | End: 2024-11-07 | Stop reason: HOSPADM

## 2024-11-05 RX ORDER — SODIUM CHLORIDE 0.9 % (FLUSH) 0.9 %
5-40 SYRINGE (ML) INJECTION PRN
Status: DISCONTINUED | OUTPATIENT
Start: 2024-11-05 | End: 2024-11-07 | Stop reason: HOSPADM

## 2024-11-05 RX ORDER — ROPINIROLE 0.25 MG/1
0.5 TABLET, FILM COATED ORAL EVERY EVENING
Status: DISCONTINUED | OUTPATIENT
Start: 2024-11-05 | End: 2024-11-07 | Stop reason: HOSPADM

## 2024-11-05 RX ORDER — PANTOPRAZOLE SODIUM 40 MG/1
40 TABLET, DELAYED RELEASE ORAL
Status: DISCONTINUED | OUTPATIENT
Start: 2024-11-05 | End: 2024-11-07 | Stop reason: HOSPADM

## 2024-11-05 RX ORDER — ASPIRIN 81 MG/1
81 TABLET, CHEWABLE ORAL DAILY
Status: DISCONTINUED | OUTPATIENT
Start: 2024-11-05 | End: 2024-11-07 | Stop reason: HOSPADM

## 2024-11-05 RX ORDER — DEXTROSE MONOHYDRATE 100 MG/ML
INJECTION, SOLUTION INTRAVENOUS CONTINUOUS PRN
Status: DISCONTINUED | OUTPATIENT
Start: 2024-11-05 | End: 2024-11-07 | Stop reason: HOSPADM

## 2024-11-05 RX ORDER — INSULIN GLARGINE 100 [IU]/ML
25 INJECTION, SOLUTION SUBCUTANEOUS NIGHTLY
COMMUNITY

## 2024-11-05 RX ORDER — MORPHINE SULFATE 2 MG/ML
1 INJECTION, SOLUTION INTRAMUSCULAR; INTRAVENOUS ONCE
Status: COMPLETED | OUTPATIENT
Start: 2024-11-05 | End: 2024-11-05

## 2024-11-05 RX ADMIN — ASPIRIN 81 MG 81 MG: 81 TABLET ORAL at 10:01

## 2024-11-05 RX ADMIN — SODIUM CHLORIDE, PRESERVATIVE FREE 10 ML: 5 INJECTION INTRAVENOUS at 10:02

## 2024-11-05 RX ADMIN — INSULIN LISPRO 4 UNITS: 100 INJECTION, SOLUTION INTRAVENOUS; SUBCUTANEOUS at 11:12

## 2024-11-05 RX ADMIN — PRAVASTATIN SODIUM 40 MG: 20 TABLET ORAL at 01:53

## 2024-11-05 RX ADMIN — INSULIN LISPRO 4 UNITS: 100 INJECTION, SOLUTION INTRAVENOUS; SUBCUTANEOUS at 01:53

## 2024-11-05 RX ADMIN — PANTOPRAZOLE SODIUM 40 MG: 40 TABLET, DELAYED RELEASE ORAL at 17:04

## 2024-11-05 RX ADMIN — ROPINIROLE HYDROCHLORIDE 0.5 MG: 0.25 TABLET, FILM COATED ORAL at 19:48

## 2024-11-05 RX ADMIN — SODIUM CHLORIDE, PRESERVATIVE FREE 10 ML: 5 INJECTION INTRAVENOUS at 19:50

## 2024-11-05 RX ADMIN — INSULIN LISPRO 2 UNITS: 100 INJECTION, SOLUTION INTRAVENOUS; SUBCUTANEOUS at 05:51

## 2024-11-05 RX ADMIN — METFORMIN HYDROCHLORIDE 500 MG: 500 TABLET, EXTENDED RELEASE ORAL at 11:12

## 2024-11-05 RX ADMIN — INSULIN GLARGINE 23 UNITS: 100 INJECTION, SOLUTION SUBCUTANEOUS at 19:48

## 2024-11-05 RX ADMIN — INSULIN LISPRO 4 UNITS: 100 INJECTION, SOLUTION INTRAVENOUS; SUBCUTANEOUS at 19:55

## 2024-11-05 RX ADMIN — MORPHINE SULFATE 1 MG: 2 INJECTION, SOLUTION INTRAMUSCULAR; INTRAVENOUS at 04:02

## 2024-11-05 RX ADMIN — METFORMIN HYDROCHLORIDE 500 MG: 500 TABLET, EXTENDED RELEASE ORAL at 17:03

## 2024-11-05 RX ADMIN — LORAZEPAM 0.5 MG: 0.5 TABLET ORAL at 13:39

## 2024-11-05 RX ADMIN — PANTOPRAZOLE SODIUM 40 MG: 40 TABLET, DELAYED RELEASE ORAL at 05:52

## 2024-11-05 RX ADMIN — LEVOTHYROXINE SODIUM 100 MCG: 0.05 TABLET ORAL at 05:52

## 2024-11-05 RX ADMIN — INSULIN LISPRO 4 UNITS: 100 INJECTION, SOLUTION INTRAVENOUS; SUBCUTANEOUS at 17:04

## 2024-11-05 RX ADMIN — PRAVASTATIN SODIUM 40 MG: 20 TABLET ORAL at 19:48

## 2024-11-05 ASSESSMENT — PAIN SCALES - GENERAL
PAINLEVEL_OUTOF10: 0
PAINLEVEL_OUTOF10: 6
PAINLEVEL_OUTOF10: 0

## 2024-11-05 ASSESSMENT — PAIN DESCRIPTION - LOCATION: LOCATION: LEG

## 2024-11-05 ASSESSMENT — PAIN DESCRIPTION - ORIENTATION: ORIENTATION: LEFT;RIGHT

## 2024-11-05 NOTE — PROGRESS NOTES
ACUTE OCCUPATIONAL THERAPY GOALS:   (Developed with and agreed upon by patient and/or caregiver.)  1. Patient will complete lower body bathing and dressing with supervision and adaptive equipment as needed.   2. Patient will complete toileting with supervision.   3. Patient will tolerate 30 minutes of OT treatment with 1-2 rest breaks to increase activity tolerance for ADLs.   4. Patient will complete functional transfers with supervision and adaptive equipment as needed.   5. Patient will complete functional mobility with supervision and good safety awareness.     Timeframe: 7 visits       OCCUPATIONAL THERAPY Initial Assessment, Daily Note, and AM       OT Visit Days: 1  Acknowledge Orders  Time  OT Charge Capture  Rehab Caseload Tracker      Dennis Santos is a 84 y.o. male   PRIMARY DIAGNOSIS: Dizziness  Dizziness [R42]  Acute posterior circulation transient ischemic attack [G45.8]       Reason for Referral: Generalized Muscle Weakness (M62.81)  Other lack of cordination (R27.8)  Dizziness and Giddiness (R42)  Observation: Payor: [employer] MEDICARE / Plan: [employer] CHOICE-PPO MEDICARE / Product Type: *No Product type* /     ASSESSMENT:     REHAB RECOMMENDATIONS:   Recommendation to date pending progress:  Setting:  Home Health Therapy    Equipment:    To Be Determined     ASSESSMENT:  Mr. Santos presents to the hospital with dizziness and CVA work-up. Pt is alert and pleasant this am. Pt deneis any specific pain but c/o neuropathy in R LE and restless leg syndrome. Pt reports he lives with his wife/daughter. Pt is typically using rollator and quad cane and denies any falls. Pt completes ADL with modified independence. Pt is hard of hearing but has hearing aids in B ears. Pt's BP taken in supine 189/81, sitting 178/83, and standing 152/71. Pt denies any significant dizziness during session but does c/o some weakness. Pt completed bed mobility, functional transfers, and functional mobility with rolling walker

## 2024-11-05 NOTE — INTERDISCIPLINARY ROUNDS
Multi-D Rounds/Checklist (leapfrog):  Lines: can any be removed?: None    External Urinary Catheter (Active)      DVT Prophylaxis: Contraindicated  Vent: N/A  Nutrition Ordered/appropriate: Contraindicated-    Can antibiotics or other drugs be stopped? N/A   MRSA swab: NA  Inpat Anti-Infectives (From admission, onward)      None          Consults needed: None  A: Is pain control adequate? (has PRNs? Stop drip?) Yes  B: Sedation break and SBT? N/A  C: Is sedation choice appropriate? N/A  D: Delirium/CAM-ICU? No  E: Mobility goals/appropriateness? Yes  F: Family update and plan? wife is surrogate decision maker and is being updated daily by primary attending and nursing staff.    GOMEZ Vang - NP

## 2024-11-05 NOTE — DIABETES MGMT
Patient seen for assessment regarding diabetes management. Patient has a past medical history of MERT, GERD, hypothyroidism, Dyslipidemia, DM type 2, HTN, neuropathy, RLS, cirrhosis of liver with ascites. Patient states they have been living with diabetes for more than 5 years. Patient states they do have a working glucometer with supplies at home. Per patient they typically check blood glucose levels daily. Per patient their blood glucose levels have been running 200s at home. Patient states they are currently taking Metformin 500 mg BID and Lantus 25 units HS at home for management of diabetes. Patient states uses insulin pens at home.  Patient voices that they have not experienced hypoglycemia in the past. Educated regarding hypoglycemia signs, symptoms, and treatment.  Patient given educational material, \"Diabetes Self-Management: A Patient Teaching Guide\" to review at their convenience.  Patient to go to MRI for scheduled scan.  Will follow along for continued diabetes education as patient condition allows.

## 2024-11-05 NOTE — PROGRESS NOTES
4 Eyes Skin Assessment     NAME:  Dennis Santos  YOB: 1940  MEDICAL RECORD NUMBER:  [id number]    The patient is being assessed for  Admission    I agree that at least one RN has performed a thorough Head to Toe Skin Assessment on the patient. ALL assessment sites listed below have been assessed.      Areas assessed by both nurses:    Head, Face, Ears, Shoulders, Back, Chest, Arms, Elbows, Hands, Sacrum. Buttock, Coccyx, Ischium, Legs. Feet and Heels, and Under Medical Devices         Does the Patient have a Wound? Yes wound(s) were present on assessment. LDA wound assessment was Initiated and completed by RN       Mike Prevention initiated by RN: Yes  Wound Care Orders initiated by RN: No    Pressure Injury (Stage 3,4, Unstageable, DTI, NWPT, and Complex wounds) if present, place Wound referral order by RN under : No    New Ostomies, if present place, Ostomy referral order under : No     Nurse 1 eSignature: Electronically signed by LAILA SOLIS RN on 11/5/24 at 1:59 AM EST    **SHARE this note so that the co-signing nurse can place an eSignature**    Nurse 2 eSignature: {Esignature:[id number]}

## 2024-11-05 NOTE — ASSESSMENT & PLAN NOTE
- Dizziness and ataxia.  Worse than typical vertigo.  Symptoms currently resolved  - Admit for rule out of possible posterior circulation stroke  - Check MRI and echo  - Aspirin/statin ordered  - PT/OT consulted  - In-house Neurology consult  - Neurochecks  - Monitor on tele

## 2024-11-05 NOTE — ED TRIAGE NOTES
Pt states that he has history of vertigo but thisa felt much worse. Pt took Meclizine prior to EMS arrival

## 2024-11-05 NOTE — PROGRESS NOTES
TRANSFER - IN REPORT:    Verbal report received from JAI Fabian on Dennis Santos  being received from ED for routine progression of patient care      Report consisted of patient's Situation, Background, Assessment and   Recommendations(SBAR).     Information from the following report(s) Nurse Handoff Report, Index, ED Encounter Summary, ED SBAR, Adult Overview, Surgery Report, Intake/Output, MAR, Recent Results, Med Rec Status, Cardiac Rhythm Sinus Clayton, Alarm Parameters, Pre Procedure Checklist, Procedure Verification, Quality Measures, Neuro Assessment, and Event Log was reviewed with the receiving nurse.    Opportunity for questions and clarification was provided.      Assessment completed upon patient's arrival to unit and care assumed.

## 2024-11-05 NOTE — ED NOTES
TRANSFER - OUT REPORT:    Verbal report given to JAI Dennis on Dennis Santos  being transferred to [facility] for routine progression of patient care       Report consisted of patient's Situation, Background, Assessment and   Recommendations(SBAR).     Information from the following report(s) Nurse Handoff Report was reviewed with the receiving nurse.    Kinder Fall Assessment:                           Lines:   Peripheral IV Left Antecubital (Active)   Site Assessment Clean, dry & intact 11/05/24 0043   Line Status Flushed;Normal saline locked 11/05/24 0043        Opportunity for questions and clarification was provided.      Patient transported with:  Registered Nurse          Dieudonne Edouard RN  11/05/24 0044

## 2024-11-05 NOTE — PLAN OF CARE
Problem: Chronic Conditions and Co-morbidities  Goal: Patient's chronic conditions and co-morbidity symptoms are monitored and maintained or improved  Outcome: Progressing  Flowsheets (Taken 11/5/2024 0110)  Care Plan - Patient's Chronic Conditions and Co-Morbidity Symptoms are Monitored and Maintained or Improved: Monitor and assess patient's chronic conditions and comorbid symptoms for stability, deterioration, or improvement     Problem: Discharge Planning  Goal: Discharge to home or other facility with appropriate resources  Outcome: Progressing  Flowsheets (Taken 11/5/2024 0110)  Discharge to home or other facility with appropriate resources: Identify barriers to discharge with patient and caregiver     Problem: Safety - Adult  Goal: Free from fall injury  Outcome: Progressing

## 2024-11-05 NOTE — ED TRIAGE NOTES
Pt arrives from home via [facility] with complaint of dizzines, NV just PTA. Per EMS pt was pale and clammy on their arrival. Sinus kannan with EMS. Hypertensive.

## 2024-11-05 NOTE — PROGRESS NOTES
Patient is an 84-year-old  male with history of hypertension, DM2 uncontrolled, HLD admitted on 11/5 as observation in view of dizziness with associated symptoms of ataxia, nausea vomiting that started abruptly last night.  Patient had a Code S evaluation in ER, teleneuro was consulted.  Patient was not a candidate for tPA/TNK.  MRI was recommended.  CT head and CTA head/neck was unremarkable for any acute pathology.  MRI brain is currently pending.  Patient seen at bedside this morning, alert and awake, AOx3, hard of hearing, on room air.  He reports feeling significantly better today in terms of his dizziness.  Denies any acute weakness and upper or lower extremities.  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  Patient's A1c is 9.2 which point towards suboptimal control.  Patient has been cleared by speech therapist to resume regular consistency diet.  Resumed Lantus 23 units SQ nightly along with metformin  mg p.o. twice daily and sliding scale insulin.  PT OT eval ordered.  Patient is currently on remote telemetry, will continue that for now.  If MRI brain is unremarkable for any acute CVA, possibility of BPV to be considered.  Patient may benefit from outpatient vestibular rehab after discharge.  Patient can be transferred out of ICU today once MRI is completed.    Patient not billed for this visit.

## 2024-11-05 NOTE — H&P
Interval History: NAEON. Patient is resting comfortably in bed. No complaints today.     Review of Systems   Constitutional:  Negative for chills and fever.   Respiratory:  Negative for cough, chest tightness and shortness of breath.    Cardiovascular:  Negative for leg swelling.   Gastrointestinal:  Negative for abdominal pain, constipation, diarrhea, nausea and vomiting.   Genitourinary:  Negative for dysuria.   Neurological:  Negative for dizziness, numbness and headaches.   Psychiatric/Behavioral:  Negative for agitation, behavioral problems and confusion.    All other systems reviewed and are negative.    Objective:     Vital Signs (Most Recent):  Temp: 98.6 °F (37 °C) (10/27/24 0726)  Pulse: 75 (10/27/24 0726)  Resp: 17 (10/27/24 0726)  BP: 118/81 (10/27/24 0726)  SpO2: 100 % (10/27/24 0726) Vital Signs (24h Range):  Temp:  [97.9 °F (36.6 °C)-99 °F (37.2 °C)] 98.6 °F (37 °C)  Pulse:  [70-79] 75  Resp:  [17-18] 17  SpO2:  [95 %-100 %] 100 %  BP: ()/(61-81) 118/81     Weight: 49.5 kg (109 lb 2 oz)  Body mass index is 19.96 kg/m².    Intake/Output Summary (Last 24 hours) at 10/27/2024 0800  Last data filed at 10/27/2024 0526  Gross per 24 hour   Intake 840 ml   Output --   Net 840 ml         Physical Exam  Constitutional:       General: She is not in acute distress.     Appearance: Normal appearance. She is not ill-appearing.   HENT:      Head: Normocephalic and atraumatic.      Right Ear: External ear normal.      Left Ear: External ear normal.      Nose: Nose normal.      Mouth/Throat:      Mouth: Mucous membranes are moist.   Eyes:      Conjunctiva/sclera: Conjunctivae normal.   Cardiovascular:      Rate and Rhythm: Normal rate and regular rhythm.      Heart sounds: Normal heart sounds. No murmur heard.  Pulmonary:      Effort: Pulmonary effort is normal. No respiratory distress.      Breath sounds: Normal breath sounds.   Abdominal:      Palpations: Abdomen is soft.      Tenderness: There is no  abdominal tenderness.   Musculoskeletal:      Right lower leg: No edema.      Left lower leg: No edema.   Skin:     General: Skin is warm and dry.   Neurological:      Mental Status: She is alert and oriented to person, place, and time.      Motor: No weakness.      Comments: Oriented to self, place and time             Significant Labs: All pertinent labs within the past 24 hours have been reviewed.    Significant Imaging: I have reviewed all pertinent imaging results/findings within the past 24 hours.   capsule TAKE 1 CAPSULE BY MOUTH DAILY AT BEDTIME    levothyroxine (SYNTHROID) 125 MCG tablet Oral, DAILY BEFORE BREAKFAST    meclizine (ANTIVERT) 25 mg, Oral, 3 TIMES DAILY PRN    metFORMIN (GLUCOPHAGE-XR) 1,000 mg, Oral, 2 times daily, TAKE 4 TABLETS BY MOUTH DAILY    Multiple Vitamin (THERAPEUTIC) TABS tablet Oral, DAILY    Omega-3 Fatty Acids (FISH OIL) 1000 MG CAPS 1 capsule, Oral, DAILY    pantoprazole (PROTONIX) 40 mg, Oral, 2 TIMES DAILY BEFORE MEALS    pravastatin (PRAVACHOL) 40 MG tablet TAKE 1 TABLET BY MOUTH NIGHTLY FOR HYPERLIPIDEMIA    rOPINIRole (REQUIP) 0.5 mg, Oral, EVERY EVENING, TAKE 1 TABLET BY MOUTH in the evening    Spacer/Aero-Holding Chambers LIZ 1 Device, Does not apply, DAILY       Objective:   Patient Vitals for the past 24 hrs:   Temp Pulse Resp BP SpO2   11/05/24 0500 -- 52 11 -- 94 %   11/05/24 0432 -- -- 22 -- --   11/05/24 0400 -- 55 10 -- 95 %   11/05/24 0300 98.3 °F (36.8 °C) 59 (!) 34 (!) 173/77 97 %   11/05/24 0245 -- 58 -- (!) 154/77 98 %   11/05/24 0230 -- 57 21 (!) 184/81 98 %   11/05/24 0215 -- 59 23 (!) 162/78 98 %   11/05/24 0200 -- 59 12 (!) 147/68 96 %   11/05/24 0145 -- 61 11 (!) 160/74 93 %   11/05/24 0130 -- 59 17 (!) 164/77 96 %   11/05/24 0115 -- 58 19 (!) 173/82 98 %   11/05/24 0110 97.9 °F (36.6 °C) -- -- (!) 147/68 98 %   11/04/24 2200 -- 57 14 (!) 166/82 97 %   11/04/24 2145 -- 56 10 (!) 162/80 --   11/04/24 2106 -- -- -- (!) 184/81 --   11/04/24 2103 97.4 °F (36.3 °C) 55 14 -- 96 %          Estimated body mass index is 27.86 kg/m² as calculated from the following:    Height as of this encounter: 1.803 m (5' 11\").    Weight as of this encounter: 90.6 kg (199 lb 11.8 oz).    Intake/Output Summary (Last 24 hours) at 11/5/2024 0622  Last data filed at 11/5/2024 0500  Gross per 24 hour   Intake --   Output 900 ml   Net -900 ml         Physical Exam:  General:    Well nourished.  No overt distress  Head:  Normocephalic, atraumatic  Eyes:  Sclerae appear normal.  Pupils  equally round.    HENT:  Nares appear normal, no drainage.  Moist mucous membranes  Neck:  No restricted ROM.  Trachea midline  CV:   RRR.  S1/S2 auscultated  Lungs:   CTAB.  No wheezing, rhonchi, or rales.  Appears even, unlabored  Abdomen:   Bowel sounds present.  Soft, nontender, nondistended.    Extremities: Warm and dry.  No cyanosis or clubbing.  No edema.    Skin:     No rashes.  Normal turgor.  Normal coloration  Neuro:  Cranial nerves II-XII grossly intact.   Sensation intact  Psych:  Normal mood and affect.  Alert and oriented x3    Data Ordered and Personally Reviewed:    Last 24hr Labs:  Recent Results (from the past 24 hour(s))   EKG 12 Lead    Collection Time: 11/04/24  9:01 PM   Result Value Ref Range    Ventricular Rate 53 BPM    Atrial Rate 53 BPM    P-R Interval 174 ms    QRS Duration 99 ms    Q-T Interval 445 ms    QTc Calculation (Bazett) 418 ms    P Axis 61 degrees    R Axis 70 degrees    T Axis 72 degrees    Diagnosis       Sinus rhythm  Abnormal R-wave progression, early transition    Confirmed by TORRI JUAN (), BUCK CANO ([id number]) on 11/5/2024 5:51:12 AM     CBC with Auto Differential    Collection Time: 11/04/24  9:05 PM   Result Value Ref Range    WBC 6.9 4.3 - 11.1 K/uL    RBC 4.23 4.23 - 5.6 M/uL    Hemoglobin 13.9 13.6 - 17.2 g/dL    Hematocrit 40.2 (L) 41.1 - 50.3 %    MCV 95.0 82 - 102 FL    MCH 32.9 26.1 - 32.9 PG    MCHC 34.6 31.4 - 35.0 g/dL    RDW 13.3 11.9 - 14.6 %    Platelets 101 (L) 150 - 450 K/uL    MPV 9.7 9.4 - 12.3 FL    nRBC 0.00 0.0 - 0.2 K/uL    Differential Type AUTOMATED      Neutrophils % 76 43 - 78 %    Lymphocytes % 15 13 - 44 %    Monocytes % 6 4.0 - 12.0 %    Eosinophils % 3 0.5 - 7.8 %    Basophils % 1 0.0 - 2.0 %    Immature Granulocytes % 0 0.0 - 5.0 %    Neutrophils Absolute 5.3 1.7 - 8.2 K/UL    Lymphocytes Absolute 1.0 0.5 - 4.6 K/UL    Monocytes Absolute 0.4 0.1 - 1.3 K/UL    Eosinophils Absolute 0.2 0.0 - 0.8 K/UL    Basophils Absolute 0.0 0.0 - 0.2 K/UL

## 2024-11-05 NOTE — ED PROVIDER NOTES
Emergency Department Provider Note       PCP: Adriana Vogt MD   Age: 84 y.o.   Sex: male     DISPOSITION Decision To Admit 11/05/2024 12:10:00 AM            ICD-10-CM    1. Acute posterior circulation transient ischemic attack  G45.8 Echo (TTE) complete (PRN contrast/bubble/strain/3D)          Medical Decision Making     Acute vertigo with constant symptoms.  Stroke alert called given patient's advanced age and inability to walk.  Attempt at head impulse testing unsuccessful     1 or more acute illnesses that pose a threat to life or bodily function.   Discussion with external consultants.  Shared medical decision making was utilized in creating the patients health plan today.    I independently ordered and reviewed each unique test.  I reviewed external records: provider visit note from outside specialist.  I reviewed external records: previous lab results from outside ED.   The patients assessment required an independent historian: EMS.  The reason they were needed is important historical information not provided by the patient.  I interpreted the CT Scan no intracranial hemorrhage, no acute process per radiology.  My Independent EKG Interpretation: sinus rhythm, no evidence of arrhythmia      ST Segments:Normal ST segments - NO STEMI   Rate: 53, sinus bradycardia with early transition, no acute ST-T changes, no STEMI  The patient was admitted and I have discussed patient management with the admitting provider.  The management of this patient was discussed with an external consultant.  Discussed with neurology aspirin only for now    Exclusion criteria - the patient is NOT to be included for SEP-1 Core Measure due to: Infection is not suspected   Critical care procedure note : 30 minutes of critical care time was performed in the emergency department. This was separate from any other procedures listed during the patients emergency department course. The failure to initiate these interventions on an urgent

## 2024-11-05 NOTE — ACP (ADVANCE CARE PLANNING)
Advance Care Planning     Advance Care Planning Activator (Inpatient)  Conversation Note      Date of ACP Conversation: 11/5/2024     ACP Activator: Riddhi Gaines RN    {When Decision Maker makes decisions on behalf of the incapacitated patient: Decision Maker is asked to consider and make decisions based on patient values, known preferences, or best interests.     Health Care Decision Maker: No LW/HCPOA on file at present. Spouse is legal NOK unless document presented stating otherwise.     Current Designated Health Care Decision Maker:     Primary Decision Maker: Jeannette Santos - Spouse - [phone]  Click here to complete Healthcare Decision Makers including section of the Healthcare Decision Maker Relationship (ie \"Primary\")  Today we documented Decision Maker(s) consistent with Legal Next of Kin hierarchy.    Care Preferences    Full code per MD orders.

## 2024-11-05 NOTE — PROGRESS NOTES
ACUTE PHYSICAL THERAPY GOALS:   (Developed with and agreed upon by patient and/or caregiver.)    (1.) Dennis Santos  will move from supine to sit and sit to supine  with SUPERVISION within 7 treatment day(s).    (2.) Dennis Santos will transfer from bed to chair and chair to bed with SUPERVISION using the least restrictive device within 7 treatment day(s).    (3.) Dennis Santos will ambulate with SUPERVISION for 300 feet with the least restrictive device within 7 treatment day(s).   (4.) Dennis Santos will perform standing static and dynamic balance activities x 10 minutes with SUPERVISION to improve safety within 7 treatment day(s)..  (5.) Dennis Santos will perform therapeutic exercises x 10 min for HEP with SUPERVISION to improve strength, endurance, and functional mobility within 7 treatment day(s).      PHYSICAL THERAPY Initial Assessment, Daily Note, and AM  (Link to Caseload Tracking: PT Visit Days : 1  Acknowledge Orders  Time In/Out  PT Charge Capture  Rehab Caseload Tracker    Dennis Santos is a 84 y.o. male   PRIMARY DIAGNOSIS: Dizziness  Dizziness [R42]  Acute posterior circulation transient ischemic attack [G45.8]       Reason for Referral: Generalized Muscle Weakness (M62.81)  Difficulty in walking, Not elsewhere classified (R26.2)  Observation: Payor: HU[employer]NA MEDICARE / Plan: HUMANA CHOICE-PPO MEDICARE / Product Type: *No Product type* /     ASSESSMENT:     REHAB RECOMMENDATIONS:   Recommendation to date pending progress:  Setting:  Home Health Therapy    Equipment:    To Be Determined     ASSESSMENT:  Mr. Santos is a 84 year old male who presents to ICU in hospital secondary to above diagnosis. Pt reports that he lives with daughter and wife in 1 level home with ramped entrance, I with mob and ADLs,  used SBQC with amb in home and 4WRW outside home. Orthostatic BP taken during session. This date pt performs mobility including sup to sit, STS, standing act at sink and

## 2024-11-05 NOTE — CARE COORDINATION
Case Management Assessment  Initial Evaluation    Date/Time of Evaluation: 11/5/2024 12:00 PM  Assessment Completed by: Riddhi Gaines RN    If patient is discharged prior to next notation, then this note serves as note for discharge by case management.    Patient Name: Dennis Santos                   YOB: 1940  Diagnosis: Dizziness [R42]  Acute posterior circulation transient ischemic attack [G45.8]                   Date / Time: 11/4/2024  9:00 PM    Patient Admission Status: Observation   Readmission Risk (Low < 19, Mod (19-27), High > 27): Readmission Risk Score: 10.3    Current PCP: Adriana Vogt MD  PCP verified by CM? (P) Yes    Chart Reviewed: Yes      History Provided by: (P) Patient  Patient Orientation: (P) Alert and Oriented    Patient Cognition: (P) Alert    Hospitalization in the last 30 days (Readmission):  No    If yes, Readmission Assessment in  Navigator will be completed.    Advance Directives:      Code Status: Full Code   Patient's Primary Decision Maker is: (P) Legal Next of Kin      Discharge Planning:    Patient lives with: (P) Spouse/Significant Other Type of Home: (P) House  Primary Care Giver: (P) Self  Patient Support Systems include: (P) Spouse/Significant Other, Children, Family Members   Current Financial resources: (P) Medicare ([employer] [facility])  Current community resources: (P) None  Current services prior to admission: (P) Durable Medical Equipment            Current DME: (P) Cane, Glucometer, Other (Comment) (hearing aids bilateral)            Type of Home Care services:  None    ADLS  Prior functional level: (P) Independent in ADLs/IADLs  Current functional level: (P) Assistance with the following:    PT AM-PAC:   /24  OT AM-PAC: 18 /24    Family can provide assistance at DC: (P) Yes  Would you like Case Management to discuss the discharge plan with any other family members/significant others, and if so, who? (P) Yes  Plans to Return to Present Housing: (P)  Yes  Other Identified Issues/Barriers to RETURNING to current housing: pending  Potential Assistance needed at discharge: N/A            Potential DME:  pending  Patient expects to discharge to: House  Plan for transportation at discharge:  family    Financial    Payor: HUMANA MEDICARE / Plan: HUMANA CHOICE-PPO MEDICARE / Product Type: *No Product type* /     Does insurance require precert for SNF: Yes    Potential assistance Purchasing Medications: (P) No  Meds-to-Beds request:  pending      [facility] - [place], [place] - [address] - P [phone] - F [phone]  [address]  [address]  [facility] [address]  Phone: [phone] Fax: [phone]    [facility] - [place], [place] - [address] -  [phone] - F [phone]  [address] [place], [place] [address]  Phone: [phone] Fax: [phone]    [address] - [address] [facility] - P [phone] - F [phone]  [address] [place] [address]  Phone: [phone] Fax: [phone]    [address] - [address] [facility] - P [phone] - F [phone]  [address] [address]  Phone: [phone] Fax: [phone]    [facility] [address] [place], [place] - [address] - P [phone] - F [phone]  [address]  [place] [address]  Phone: [phone] Fax: [phone]    [id number] [place], [place] - [address] [facility] - P [phone] - F [phone]  [address] [place] [address]  Phone: [phone] Fax: [phone]    [id number] - [place], [place] - [address] - P [phone] - F [phone]  [address]  [facility] [address]  Phone: [phone] Fax: [phone]      Notes:    Factors facilitating achievement of

## 2024-11-05 NOTE — DISCHARGE INSTRUCTIONS
Follow-up care is a key part of your treatment and safety. Be sure to make and go to all appointments, and call your doctor if you are having problems. It's also a good idea to know your test results and keep a list of the medicines you take.    How can you care for yourself at home?   Enter a stroke rehabilitation (rehab) program, if your doctor recommends it. Physical, speech, and occupational therapies can help you manage bathing, dressing, eating, and other basics of daily living.  Eat a heart-healthy diet that is low in cholesterol, saturated fat, and salt. Eat lots of fresh fruits and vegetables and foods high in fiber.  Increase your activities slowly. Take short rest breaks when you get tired. Gradually increase the amount you walk. Start out by walking a little more than you did the day before.  Do not drive until your doctor says it is okay.  It is normal to feel sad or depressed after a stroke. If the “blues” last, talk to your doctor.  If you are having problems with urine leakage, go to the bathroom at regular times, including when you first wake up and at bedtime. Also, limit fluids after dinner.  If you are constipated, drink plenty of fluids, enough so that your urine is light yellow or clear like water. If you have kidney, heart, or liver disease and have to limit fluids, talk with your doctor before you increase the amount of fluids you drink. Set up a regular time for using the toilet. If you continue to have constipation, your doctor may suggest using a bulking agent, such as Metamucil, or a stool softener, laxative, or enema.    Medicines  Take your medicines exactly as prescribed. Call your doctor if you think you are having a problem with your medicine. You may be taking several medicines. ACE (angiotensin-converting enzyme) inhibitors, angiotensin II receptor blockers (ARBs), beta-blockers, diuretics (water pills), and calcium channel blockers control your blood pressure. Statins help lower  disclaims any warranty or liability for your use of this information.

## 2024-11-05 NOTE — PROGRESS NOTES
GOALS:  LTG: Patient will tolerate least restrictive diet without overt signs or symptoms of airway compromise.   STG: Patient will tolerate regular diet and thin liquids without overt signs or symptoms of airway compromise.   STG: Patient will participate in modified barium swallow study as clinically indicated.      SPEECH LANGUAGE PATHOLOGY: DYSPHAGIA Initial Assessment and Discharge    Acknowledge Order  I  Therapy Time  I   Charges     I  Rehab Caseload Tracker      NAME: Dennis Santos  : 1940  MRN: [id number]    ADMISSION DATE: 2024  PRIMARY DIAGNOSIS: Dizziness    ICD-10: Treatment Diagnosis: R13.12 Dysphagia, Oropharyngeal Phase    RECOMMENDATIONS   Diet:    Regular Consistency  Thin Liquids    Medication: as tolerated   Compensatory Swallowing Strategies:   Upright as possible for all oral intake   Therapeutic Intervention:   Patient/family education  Dysphagia treatment   Patient continues to require skilled intervention:  No. Please re-consult if new concerns arise.      Anticipated Discharge Needs: MRI negative and patient denies change in status related to speech therapy services on previous date. Speech therapy will sign off.      ASSESSMENT    Dysphagia: Patient's oral and pharyngeal phases of swallow were unremarkable. No overt signs of aspiration were noted with all PO trials. Patient denies difficulty with PO intake aside from difficulty due to ill fitted dentures though patient denied need for texture modification. Recommend regular diet with thin liquids.     Patient reports speech, language, cognition, voice, and swallow at baseline. Of note, patient's speech volume is slightly reduced and very mild imprecision noted in speech production though patient also wears hearing aids which may impact speech production/self monitoring. Otherwise, patient denies deficits at time of this evaluation. Neurology work up and MRI on order. Will follow up if work up remarkable for acute  and Severity Scale (LEONA)  Score: 6 Description   [] 7 Normal in all situations   [x] 6 Within Functional Limits/ Modified independent   [] 5 Mild Dysphagia: Distant Supervision. May need one diet consistency      restricted.   [] 4 Mild-Moderate Dysphagia: Intermittent supervision/cuing. One-two diet    consistencies restricted.   [] 3 Moderate Dysphagia: Total assistance, supervision, or strategies.       Two or more diet consistencies restricted.   [] 2 Moderate-Severe Dysphagia: Maximum assistance or maximum use     of strategies with partial po intake   [] 1 Severe dysphagia- NPO. Unable to tolerate any po safely     PLAN    Duration/Frequency: Continue to follow patient 1x/week for duration of hospitalization and/or until goals met    Rehabilitation Potential For Stated Goals: Good    Interdisciplinary Collaboration: RN/ PCT    Medical Necessity    Skilled intervention continues to be required due to medical complications.    Education:   Patient educated on Results of evaluation, Speech therapy recommendations, Role of speech therapy, SLP plan, and Diet recommendations  Education provided to Patient  Education response: Verbalizes understanding    Safety:   Call light within reach  In Bed  RN notified     Therapy Time:  Time In: 0825  Time Out: 0839  Minutes: 14    RINA BARRIOS  11/5/2024 8:41 AM

## 2024-11-06 ENCOUNTER — APPOINTMENT (OUTPATIENT)
Dept: NON INVASIVE DIAGNOSTICS | Age: 84
End: 2024-11-06
Attending: FAMILY MEDICINE
Payer: MEDICARE

## 2024-11-06 LAB
ANION GAP SERPL CALC-SCNC: 12 MMOL/L (ref 7–16)
BASOPHILS # BLD: 0.1 K/UL (ref 0–0.2)
BASOPHILS NFR BLD: 1 % (ref 0–2)
BUN SERPL-MCNC: 16 MG/DL (ref 8–23)
CALCIUM SERPL-MCNC: 9.2 MG/DL (ref 8.8–10.2)
CHLORIDE SERPL-SCNC: 102 MMOL/L (ref 98–107)
CO2 SERPL-SCNC: 23 MMOL/L (ref 20–29)
CREAT SERPL-MCNC: 0.94 MG/DL (ref 0.8–1.3)
DIFFERENTIAL METHOD BLD: ABNORMAL
ECHO AO ASC DIAM: 3 CM
ECHO AO ASCENDING AORTA INDEX: 1.42 CM/M2
ECHO AO ROOT DIAM: 3.6 CM
ECHO AO ROOT INDEX: 1.71 CM/M2
ECHO AV AREA PEAK VELOCITY: 3.3 CM2
ECHO AV AREA VTI: 3 CM2
ECHO AV AREA/BSA PEAK VELOCITY: 1.6 CM2/M2
ECHO AV AREA/BSA VTI: 1.4 CM2/M2
ECHO AV MEAN GRADIENT: 2 MMHG
ECHO AV MEAN VELOCITY: 0.7 M/S
ECHO AV PEAK GRADIENT: 4 MMHG
ECHO AV PEAK VELOCITY: 1 M/S
ECHO AV VELOCITY RATIO: 1
ECHO AV VTI: 22.4 CM
ECHO BSA: 2.13 M2
ECHO EST RA PRESSURE: 3 MMHG
ECHO IVC PROX: 1.4 CM
ECHO LA AREA 2C: 23 CM2
ECHO LA AREA 4C: 19.3 CM2
ECHO LA MAJOR AXIS: 6.3 CM
ECHO LA MINOR AXIS: 6.4 CM
ECHO LA VOL BP: 56 ML (ref 18–58)
ECHO LA VOL MOD A2C: 67 ML (ref 18–58)
ECHO LA VOL MOD A4C: 46 ML (ref 18–58)
ECHO LA VOL/BSA BIPLANE: 27 ML/M2 (ref 16–34)
ECHO LA VOLUME INDEX MOD A2C: 32 ML/M2 (ref 16–34)
ECHO LA VOLUME INDEX MOD A4C: 22 ML/M2 (ref 16–34)
ECHO LV E' LATERAL VELOCITY: 5.9 CM/S
ECHO LV E' SEPTAL VELOCITY: 5.3 CM/S
ECHO LV EDV A2C: 63 ML
ECHO LV EDV A4C: 77 ML
ECHO LV EDV INDEX A4C: 36 ML/M2
ECHO LV EDV NDEX A2C: 30 ML/M2
ECHO LV EJECTION FRACTION A2C: 85 %
ECHO LV EJECTION FRACTION A4C: 60 %
ECHO LV EJECTION FRACTION BIPLANE: 74 % (ref 55–100)
ECHO LV ESV A2C: 9 ML
ECHO LV ESV A4C: 31 ML
ECHO LV ESV INDEX A2C: 4 ML/M2
ECHO LV ESV INDEX A4C: 15 ML/M2
ECHO LV FRACTIONAL SHORTENING: 42 % (ref 28–44)
ECHO LV INTERNAL DIMENSION DIASTOLE INDEX: 2.27 CM/M2
ECHO LV INTERNAL DIMENSION DIASTOLIC: 4.8 CM (ref 4.2–5.9)
ECHO LV INTERNAL DIMENSION SYSTOLIC INDEX: 1.33 CM/M2
ECHO LV INTERNAL DIMENSION SYSTOLIC: 2.8 CM
ECHO LV IVSD: 1 CM (ref 0.6–1)
ECHO LV MASS 2D: 181.9 G (ref 88–224)
ECHO LV MASS INDEX 2D: 86.2 G/M2 (ref 49–115)
ECHO LV POSTERIOR WALL DIASTOLIC: 1.1 CM (ref 0.6–1)
ECHO LV RELATIVE WALL THICKNESS RATIO: 0.46
ECHO LVOT AREA: 3.5 CM2
ECHO LVOT AV VTI INDEX: 0.88
ECHO LVOT DIAM: 2.1 CM
ECHO LVOT MEAN GRADIENT: 2 MMHG
ECHO LVOT PEAK GRADIENT: 4 MMHG
ECHO LVOT PEAK VELOCITY: 1 M/S
ECHO LVOT STROKE VOLUME INDEX: 32.3 ML/M2
ECHO LVOT SV: 68.2 ML
ECHO LVOT VTI: 19.7 CM
ECHO MV A VELOCITY: 0.78 M/S
ECHO MV E DECELERATION TIME (DT): 430 MS
ECHO MV E VELOCITY: 0.5 M/S
ECHO MV E/A RATIO: 0.64
ECHO MV E/E' LATERAL: 8.47
ECHO MV E/E' RATIO (AVERAGED): 8.95
ECHO MV E/E' SEPTAL: 9.43
ECHO PV ACCELERATION TIME (AT): 158 MS
ECHO PV MAX VELOCITY: 1.2 M/S
ECHO PV PEAK GRADIENT: 6 MMHG
ECHO RV BASAL DIMENSION: 3.7 CM
ECHO RV FREE WALL PEAK S': 13.5 CM/S
ECHO RV TAPSE: 1.8 CM (ref 1.7–?)
EOSINOPHIL # BLD: 0.1 K/UL (ref 0–0.8)
EOSINOPHIL NFR BLD: 2 % (ref 0.5–7.8)
ERYTHROCYTE [DISTWIDTH] IN BLOOD BY AUTOMATED COUNT: 13.2 % (ref 11.9–14.6)
GLUCOSE BLD STRIP.AUTO-MCNC: 215 MG/DL (ref 65–100)
GLUCOSE BLD STRIP.AUTO-MCNC: 267 MG/DL (ref 65–100)
GLUCOSE BLD STRIP.AUTO-MCNC: 278 MG/DL (ref 65–100)
GLUCOSE BLD STRIP.AUTO-MCNC: 319 MG/DL (ref 65–100)
GLUCOSE SERPL-MCNC: 249 MG/DL (ref 70–99)
HCT VFR BLD AUTO: 42.3 % (ref 41.1–50.3)
HGB BLD-MCNC: 14.9 G/DL (ref 13.6–17.2)
IMM GRANULOCYTES # BLD AUTO: 0 K/UL (ref 0–0.5)
IMM GRANULOCYTES NFR BLD AUTO: 1 % (ref 0–5)
LYMPHOCYTES # BLD: 1.1 K/UL (ref 0.5–4.6)
LYMPHOCYTES NFR BLD: 22 % (ref 13–44)
MCH RBC QN AUTO: 32.7 PG (ref 26.1–32.9)
MCHC RBC AUTO-ENTMCNC: 35.2 G/DL (ref 31.4–35)
MCV RBC AUTO: 93 FL (ref 82–102)
MONOCYTES # BLD: 0.4 K/UL (ref 0.1–1.3)
MONOCYTES NFR BLD: 7 % (ref 4–12)
NEUTS SEG # BLD: 3.5 K/UL (ref 1.7–8.2)
NEUTS SEG NFR BLD: 68 % (ref 43–78)
NRBC # BLD: 0 K/UL (ref 0–0.2)
PLATELET # BLD AUTO: 89 K/UL (ref 150–450)
PMV BLD AUTO: 10.1 FL (ref 9.4–12.3)
POTASSIUM SERPL-SCNC: 4.2 MMOL/L (ref 3.5–5.1)
RBC # BLD AUTO: 4.55 M/UL (ref 4.23–5.6)
SERVICE CMNT-IMP: ABNORMAL
SODIUM SERPL-SCNC: 137 MMOL/L (ref 136–145)
WBC # BLD AUTO: 5.2 K/UL (ref 4.3–11.1)

## 2024-11-06 PROCEDURE — 6370000000 HC RX 637 (ALT 250 FOR IP): Performed by: HOSPITALIST

## 2024-11-06 PROCEDURE — 82962 GLUCOSE BLOOD TEST: CPT

## 2024-11-06 PROCEDURE — 97535 SELF CARE MNGMENT TRAINING: CPT

## 2024-11-06 PROCEDURE — 99222 1ST HOSP IP/OBS MODERATE 55: CPT | Performed by: PSYCHIATRY & NEUROLOGY

## 2024-11-06 PROCEDURE — 85025 COMPLETE CBC W/AUTO DIFF WBC: CPT

## 2024-11-06 PROCEDURE — G0378 HOSPITAL OBSERVATION PER HR: HCPCS

## 2024-11-06 PROCEDURE — 36415 COLL VENOUS BLD VENIPUNCTURE: CPT

## 2024-11-06 PROCEDURE — 2580000003 HC RX 258: Performed by: HOSPITALIST

## 2024-11-06 PROCEDURE — 97530 THERAPEUTIC ACTIVITIES: CPT

## 2024-11-06 PROCEDURE — 2580000003 HC RX 258: Performed by: FAMILY MEDICINE

## 2024-11-06 PROCEDURE — 6370000000 HC RX 637 (ALT 250 FOR IP): Performed by: FAMILY MEDICINE

## 2024-11-06 PROCEDURE — 93306 TTE W/DOPPLER COMPLETE: CPT | Performed by: INTERNAL MEDICINE

## 2024-11-06 PROCEDURE — 6360000004 HC RX CONTRAST MEDICATION: Performed by: HOSPITALIST

## 2024-11-06 PROCEDURE — 6370000000 HC RX 637 (ALT 250 FOR IP): Performed by: NURSE PRACTITIONER

## 2024-11-06 PROCEDURE — 80048 BASIC METABOLIC PNL TOTAL CA: CPT

## 2024-11-06 PROCEDURE — C8929 TTE W OR WO FOL WCON,DOPPLER: HCPCS

## 2024-11-06 RX ORDER — LISINOPRIL 5 MG/1
5 TABLET ORAL DAILY
Status: DISCONTINUED | OUTPATIENT
Start: 2024-11-06 | End: 2024-11-07

## 2024-11-06 RX ORDER — GLIPIZIDE 5 MG/1
2.5 TABLET ORAL
Status: DISCONTINUED | OUTPATIENT
Start: 2024-11-06 | End: 2024-11-07 | Stop reason: HOSPADM

## 2024-11-06 RX ORDER — METFORMIN HYDROCHLORIDE 500 MG/1
1000 TABLET, EXTENDED RELEASE ORAL 2 TIMES DAILY WITH MEALS
Status: DISCONTINUED | OUTPATIENT
Start: 2024-11-06 | End: 2024-11-07 | Stop reason: HOSPADM

## 2024-11-06 RX ORDER — TAMSULOSIN HYDROCHLORIDE 0.4 MG/1
0.4 CAPSULE ORAL NIGHTLY
Status: DISCONTINUED | OUTPATIENT
Start: 2024-11-06 | End: 2024-11-07

## 2024-11-06 RX ORDER — LANOLIN ALCOHOL/MO/W.PET/CERES
3 CREAM (GRAM) TOPICAL NIGHTLY PRN
Status: DISCONTINUED | OUTPATIENT
Start: 2024-11-06 | End: 2024-11-07 | Stop reason: HOSPADM

## 2024-11-06 RX ADMIN — TAMSULOSIN HYDROCHLORIDE 0.4 MG: 0.4 CAPSULE ORAL at 20:42

## 2024-11-06 RX ADMIN — ROPINIROLE HYDROCHLORIDE 0.5 MG: 0.25 TABLET, FILM COATED ORAL at 20:42

## 2024-11-06 RX ADMIN — METFORMIN HYDROCHLORIDE 1000 MG: 500 TABLET, EXTENDED RELEASE ORAL at 07:41

## 2024-11-06 RX ADMIN — SODIUM CHLORIDE, PRESERVATIVE FREE 10 ML: 5 INJECTION INTRAVENOUS at 07:41

## 2024-11-06 RX ADMIN — INSULIN LISPRO 4 UNITS: 100 INJECTION, SOLUTION INTRAVENOUS; SUBCUTANEOUS at 20:43

## 2024-11-06 RX ADMIN — LEVOTHYROXINE SODIUM 100 MCG: 0.05 TABLET ORAL at 06:27

## 2024-11-06 RX ADMIN — PRAVASTATIN SODIUM 40 MG: 20 TABLET ORAL at 20:42

## 2024-11-06 RX ADMIN — LISINOPRIL 5 MG: 5 TABLET ORAL at 11:44

## 2024-11-06 RX ADMIN — ASPIRIN 81 MG 81 MG: 81 TABLET ORAL at 07:41

## 2024-11-06 RX ADMIN — METFORMIN HYDROCHLORIDE 1000 MG: 500 TABLET, EXTENDED RELEASE ORAL at 17:28

## 2024-11-06 RX ADMIN — SODIUM CHLORIDE, PRESERVATIVE FREE 0.3 ML: 5 INJECTION INTRAVENOUS at 12:03

## 2024-11-06 RX ADMIN — INSULIN LISPRO 4 UNITS: 100 INJECTION, SOLUTION INTRAVENOUS; SUBCUTANEOUS at 17:28

## 2024-11-06 RX ADMIN — SODIUM CHLORIDE, PRESERVATIVE FREE 10 ML: 5 INJECTION INTRAVENOUS at 20:43

## 2024-11-06 RX ADMIN — Medication 3 MG: at 20:42

## 2024-11-06 RX ADMIN — INSULIN LISPRO 2 UNITS: 100 INJECTION, SOLUTION INTRAVENOUS; SUBCUTANEOUS at 07:42

## 2024-11-06 RX ADMIN — PANTOPRAZOLE SODIUM 40 MG: 40 TABLET, DELAYED RELEASE ORAL at 17:28

## 2024-11-06 RX ADMIN — GLIPIZIDE 2.5 MG: 5 TABLET ORAL at 07:46

## 2024-11-06 RX ADMIN — PANTOPRAZOLE SODIUM 40 MG: 40 TABLET, DELAYED RELEASE ORAL at 06:27

## 2024-11-06 RX ADMIN — INSULIN LISPRO 6 UNITS: 100 INJECTION, SOLUTION INTRAVENOUS; SUBCUTANEOUS at 11:43

## 2024-11-06 RX ADMIN — INSULIN GLARGINE 23 UNITS: 100 INJECTION, SOLUTION SUBCUTANEOUS at 20:42

## 2024-11-06 ASSESSMENT — PAIN SCALES - GENERAL
PAINLEVEL_OUTOF10: 0

## 2024-11-06 NOTE — DIABETES MGMT
Patient admitted with dizziness. Blood glucose ranged 225-288 yesterday with patient receiving Lantus 23 units and Humalog 12 units. Blood glucose this morning was 215. Creatinine 0.94. GFR 81. Reviewed patient current regimen: Glipizide 2.5 mg daily, Lantus 23 units HS, Humalog correctional insulin, and Metformin 1000 mg BID.    Patient seen for diabetes education.  Patient given educational material, \"Diabetes Self-Management: A Patient Teaching Guide\", which was reviewed with patient. Explained basic physiology of diabetes, as well as causes, signs and symptoms, and treatments for hypoglycemia and hyperglycemia. Described the effects of poor glycemic control and the development of long-term complications such as renal, eye, nerve, and cardiovascular disease. Patient denies numbness and tingling in feet. Described proper diabetic foot care and the importance of checking feet daily. Per patient they typically drink water and diet soda. Reviewed effects of sweetened beverages on glycemic control and discussed alternative beverages to help improve glycemic control. Per patient they typically eat oatmeal and toast for breakfast, grilled cheese for lunch, and spaghetti for dinner. Educated re: effects of carbohydrates on blood glucose, the \"plate method\" of healthy meal planning, basics of healthy meal plan, Consistent Carbohydrate Diet, discussed the basics of carb counting and how to read a nutrition label. Educated patient regarding the benefits of physical activity (as cleared by provider) on glycemic control. Also explained the relationship between hyperglycemia and infection and delayed healing. Discussed target goals for blood glucose and A1C. Educated patient regarding diabetic medications including mechanism of action, timing, and possible side effects. Patient verbalizes understanding of teaching.    Explained the importance of blood glucose monitoring to patient and how this will provide their primary care

## 2024-11-06 NOTE — CONSULTS
Neurology    84-year-old male who came to the hospital with dizziness and was seen by access [facility] neurology last night.  Reports dizziness associated with substantial nausea and with imbalance.  Past medical history  Hypertension type 2 diabetes dyslipidemia    Smoking history    Negative  Review of systems  Review of systems  General reports normal energy.  Sleep wake cycle appetite  ENT no sinus congestion no epistaxis no unilateral hearing loss no swallowing difficulty  Pulmonary-denies shortness of breath, no orthopnea, no wheezing, no productive sputum no hemoptysis  Cardiac denies chest pain palpitations peripheral edema  GI weight is stable appetite steady no constipation diarrhea abdominal pain  Joints no inflammation no hip pain or shoulder pain no inflammation.  No new onset back pain  Skin no rash or ecchymosis  Neuro no syncope vertigo diplopia dysarthria dysphagia difficulty with balance or coordination unilateral weakness prior to the current episode   no nocturia.    Psychiatric no mood disorder no camilo no depression no outbursts or belligerent behavior    Neuro exam  The patient is alert cooperative and oriented.  No evident skin lesions no evidence of excessive bruising no trauma  Patient looks well-hydrated.  Does not appear chronically ill.  Cranial nerve examination normal visual fields.  Normal random eye movements.  No ptosis.  There are several beats of nystagmus looking to the left.  There is no evidence of SKEW deviation and I did not perform the head impulse test  Face moves strongly symmetrically and equally  Speech is normal  Motor  Upper extremities  Normal bulk strength tone and symmetric   Lower extremities  Normal bulk strength tone  Deep tendon reflexes 1+ and symmetric at biceps brachioradialis and triceps  Casual gait is not tested at this point  Finger-to-nose testing normal    Peripheral sensation light touch normal  There are no carotid bruits  Vital signs are

## 2024-11-06 NOTE — CARE COORDINATION
Chart reviewed and pt discussed in am IDR. Continues CCU awaiting floor bed. PT/OT recommending HH at present. Continues obs status and MATTHEWS has been delivered. CM will follow for HH needs at d/c when medical stable for d/c. LOS 2 days.

## 2024-11-06 NOTE — PROGRESS NOTES
ACUTE PHYSICAL THERAPY GOALS:   (Developed with and agreed upon by patient and/or caregiver.)  (1.) Dennis Santos  will move from supine to sit and sit to supine  with SUPERVISION within 7 treatment day(s).    (2.) Dennis Santos will transfer from bed to chair and chair to bed with SUPERVISION using the least restrictive device within 7 treatment day(s).    (3.) Dennis Santos will ambulate with SUPERVISION for 300 feet with the least restrictive device within 7 treatment day(s).   (4.) Dennis Santos will perform standing static and dynamic balance activities x 10 minutes with SUPERVISION to improve safety within 7 treatment day(s)..  (5.) Dennis Santos will perform therapeutic exercises x 10 min for HEP with SUPERVISION to improve strength, endurance, and functional mobility within 7 treatment day(s).    PHYSICAL THERAPY: Daily Note AM   (Link to Caseload Tracking: PT Visit Days : 1  Time In/Out PT Charge Capture  Rehab Caseload Tracker  Orders    Dennis Santos is a 84 y.o. male   PRIMARY DIAGNOSIS: Dizziness  Dizziness [R42]  Acute posterior circulation transient ischemic attack [G45.8]       Observation: Payor: HU[employer]NA MEDICARE / Plan: HU[employer]NA CHOICE-PPO MEDICARE / Product Type: *No Product type* /     ASSESSMENT:     REHAB RECOMMENDATIONS:   Recommendation to date pending progress:  Setting:  Home Health Therapy    Equipment:    To Be Determined     ASSESSMENT:  Mr. Santos is supine on arrival, agreeable to mobility. Pt reports no dizziness currently, orthostatics taken and noted in chart. Pt noted to be saturated on arrival, assisted with linen and gown change. Pt with overall CGA/MIN A transfers, ambulation in hallway with RW. Pt with slow kira, steppage gait at times, reports due to old R ankle fracture. Pt with increased distance today, slow kira, near end of ambulation, increased c/o dizziness with activity, BP noted below. Pt overall doing well with general mobility and

## 2024-11-06 NOTE — PROGRESS NOTES
ACUTE OCCUPATIONAL THERAPY GOALS:   (Developed with and agreed upon by patient and/or caregiver.)  1. Patient will complete lower body bathing and dressing with supervision and adaptive equipment as needed.   2. Patient will complete toileting with supervision.   3. Patient will tolerate 30 minutes of OT treatment with 1-2 rest breaks to increase activity tolerance for ADLs.   4. Patient will complete functional transfers with supervision and adaptive equipment as needed.   5. Patient will complete functional mobility with supervision and good safety awareness.      Timeframe: 7 visits      OCCUPATIONAL THERAPY: Daily Note PM   OT Visit Days: 2   Time In/Out  OT Charge Capture  Rehab Caseload Tracker  OT Orders    Fall risk    Dennis Santos is a 84 y.o. male   PRIMARY DIAGNOSIS: Dizziness  Dizziness [R42]  Acute posterior circulation transient ischemic attack [G45.8]       Observation: Payor: HUMANA MEDICARE / Plan: HU[employer]NA CHOICE-PPO MEDICARE / Product Type: *No Product type* /     ASSESSMENT:     REHAB RECOMMENDATIONS:   Recommendation to date pending progress:  Setting:  Home Health Therapy    Equipment:    To Be Determined     ASSESSMENT:  Mr. Santos found supine in bed, but agreeable to therapy and completed sponge bath from edge of bed with min A for LB tasks mostly due to feeling dizzy with positional changes and bending over. Reports he did not sleep well and wants to take a nap. CGA for standing and bed mobility. Pt is progressing towards all goals. Continue OT efforts and POC. Reports wife will help him at home.        SUBJECTIVE:     Mr. Santos states, \"I really just want to take a nap, but we can get cleaned up.\"     Social/Functional Lives With: Spouse  Type of Home: House  Home Layout: One level  Home Access: Ramped entrance  Bathroom Shower/Tub: Tub/Shower unit  Bathroom Toilet: Standard  Bathroom Equipment: Grab bars in shower, Grab bars around toilet, Tub transfer bench  Home Equipment: Cane

## 2024-11-06 NOTE — PROGRESS NOTES
Hospitalist Progress Note   Admit Date:  2024  9:00 PM   Name:  Dennis Santos   Age:  84 y.o.  Sex:  male  :  1940   MRN:  [id number]   Room:  [address] [place] [address]    Presenting/Chief Complaint: Dizziness     Reason(s) for Admission: Dizziness [R42]  Acute posterior circulation transient ischemic attack [G45.8]     Hospital Course:   Patient is an 84-year-old  male with history of hypertension, DM2 uncontrolled, HLD admitted on  as observation in view of dizziness with associated symptoms of ataxia, nausea vomiting that started abruptly last night.  Patient had a Code S evaluation in ER, teleneuro was consulted.  Patient was not a candidate for tPA/TNK. CT head and CTA head/neck was unremarkable for any acute pathology.   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  Patient's A1c is 9.2 which point towards suboptimal control.  Patient has been cleared by speech therapist to resume regular consistency diet.  Resumed Lantus 23 units SQ nightly along with metformin  mg p.o. twice daily and sliding scale insulin.  PT OT eval ordered.  Patient is currently on remote telemetry, will continue that for now.    Subjective & 24hr Events:   -  Patient seen and examined at bedside.  He is alert and awake, AOx3, on room air.  Patient reports feeling better, denies any further episodes of dizziness lightheadedness.  No reported chest pain, shortness of breath.    ROS:  10 point review of system is negative except for what mentioned above.      Assessment & Plan:     Principal Problem:    Dizziness  Plan: -  Likely due to inner ear pathology/acute vestibular process.  CT head, CTA head and neck and MRI brain are unremarkable for any acute pathology.  Speech has cleared the patient, tolerating regular diet.  PT OT recommends outpatient vestibular rehab.  Neurology has evaluated the patient, and any further workup.   0.8 K/UL    Basophils Absolute 0.1 0.0 - 0.2 K/UL    Immature Granulocytes Absolute 0.0 0.0 - 0.5 K/UL   Basic Metabolic Panel w/ Reflex to MG    Collection Time: 11/06/24  4:29 AM   Result Value Ref Range    Sodium 137 136 - 145 mmol/L    Potassium 4.2 3.5 - 5.1 mmol/L    Chloride 102 98 - 107 mmol/L    CO2 23 20 - 29 mmol/L    Anion Gap 12 7 - 16 mmol/L    Glucose 249 (H) 70 - 99 mg/dL    BUN 16 8 - 23 MG/DL    Creatinine 0.94 0.80 - 1.30 MG/DL    Est, Glom Filt Rate 81 >60 ml/min/1.73m2    Calcium 9.2 8.8 - 10.2 MG/DL       No results for input(s): \"COVID19\" in the last 72 hours.    Current Meds:  Current Facility-Administered Medications   Medication Dose Route Frequency    metFORMIN (GLUCOPHAGE-XR) extended release tablet 1,000 mg  1,000 mg Oral BID WC    glipiZIDE (GLUCOTROL) tablet 2.5 mg  2.5 mg Oral QAM AC    meclizine (ANTIVERT) tablet 25 mg  25 mg Oral TID PRN    insulin lispro (HUMALOG,ADMELOG) injection vial 0-8 Units  0-8 Units SubCUTAneous 4x Daily AC & HS    pantoprazole (PROTONIX) tablet 40 mg  40 mg Oral BID AC    pravastatin (PRAVACHOL) tablet 40 mg  40 mg Oral Nightly    rOPINIRole (REQUIP) tablet 0.5 mg  0.5 mg Oral QPM    sodium chloride flush 0.9 % injection 5-40 mL  5-40 mL IntraVENous 2 times per day    sodium chloride flush 0.9 % injection 5-40 mL  5-40 mL IntraVENous PRN    0.9 % sodium chloride infusion   IntraVENous PRN    ondansetron (ZOFRAN-ODT) disintegrating tablet 4 mg  4 mg Oral Q8H PRN    Or    ondansetron (ZOFRAN) injection 4 mg  4 mg IntraVENous Q6H PRN    polyethylene glycol (GLYCOLAX) packet 17 g  17 g Oral Daily PRN    aspirin chewable tablet 81 mg  81 mg Oral Daily    Or    aspirin suppository 300 mg  300 mg Rectal Daily    labetalol (NORMODYNE;TRANDATE) injection 10 mg  10 mg IntraVENous Q10 Min PRN    levothyroxine (SYNTHROID) tablet 100 mcg  100 mcg Oral QAM AC    glucose chewable tablet 16 g  4 tablet Oral PRN    dextrose bolus 10% 125 mL  125 mL IntraVENous PRN    Or

## 2024-11-07 VITALS
WEIGHT: 199.74 LBS | DIASTOLIC BLOOD PRESSURE: 75 MMHG | TEMPERATURE: 97 F | HEIGHT: 71 IN | BODY MASS INDEX: 27.96 KG/M2 | SYSTOLIC BLOOD PRESSURE: 159 MMHG | HEART RATE: 63 BPM | RESPIRATION RATE: 17 BRPM | OXYGEN SATURATION: 98 %

## 2024-11-07 LAB
ANION GAP SERPL CALC-SCNC: 10 MMOL/L (ref 7–16)
BASOPHILS # BLD: 0 K/UL (ref 0–0.2)
BASOPHILS NFR BLD: 1 % (ref 0–2)
BUN SERPL-MCNC: 27 MG/DL (ref 8–23)
CALCIUM SERPL-MCNC: 9.2 MG/DL (ref 8.8–10.2)
CHLORIDE SERPL-SCNC: 100 MMOL/L (ref 98–107)
CO2 SERPL-SCNC: 27 MMOL/L (ref 20–29)
CREAT SERPL-MCNC: 1.29 MG/DL (ref 0.8–1.3)
DIFFERENTIAL METHOD BLD: ABNORMAL
EOSINOPHIL # BLD: 0.1 K/UL (ref 0–0.8)
EOSINOPHIL NFR BLD: 2 % (ref 0.5–7.8)
ERYTHROCYTE [DISTWIDTH] IN BLOOD BY AUTOMATED COUNT: 13.4 % (ref 11.9–14.6)
GLUCOSE BLD STRIP.AUTO-MCNC: 199 MG/DL (ref 65–100)
GLUCOSE SERPL-MCNC: 201 MG/DL (ref 70–99)
HCT VFR BLD AUTO: 43.1 % (ref 41.1–50.3)
HGB BLD-MCNC: 14.9 G/DL (ref 13.6–17.2)
IMM GRANULOCYTES # BLD AUTO: 0 K/UL (ref 0–0.5)
IMM GRANULOCYTES NFR BLD AUTO: 1 % (ref 0–5)
LYMPHOCYTES # BLD: 1.1 K/UL (ref 0.5–4.6)
LYMPHOCYTES NFR BLD: 18 % (ref 13–44)
MCH RBC QN AUTO: 33.3 PG (ref 26.1–32.9)
MCHC RBC AUTO-ENTMCNC: 34.6 G/DL (ref 31.4–35)
MCV RBC AUTO: 96.2 FL (ref 82–102)
MONOCYTES # BLD: 0.3 K/UL (ref 0.1–1.3)
MONOCYTES NFR BLD: 6 % (ref 4–12)
NEUTS SEG # BLD: 4.5 K/UL (ref 1.7–8.2)
NEUTS SEG NFR BLD: 74 % (ref 43–78)
NRBC # BLD: 0 K/UL (ref 0–0.2)
PLATELET # BLD AUTO: 92 K/UL (ref 150–450)
PMV BLD AUTO: 10.1 FL (ref 9.4–12.3)
POTASSIUM SERPL-SCNC: 4.4 MMOL/L (ref 3.5–5.1)
RBC # BLD AUTO: 4.48 M/UL (ref 4.23–5.6)
SERVICE CMNT-IMP: ABNORMAL
SODIUM SERPL-SCNC: 136 MMOL/L (ref 136–145)
WBC # BLD AUTO: 6 K/UL (ref 4.3–11.1)

## 2024-11-07 PROCEDURE — G0378 HOSPITAL OBSERVATION PER HR: HCPCS

## 2024-11-07 PROCEDURE — 80048 BASIC METABOLIC PNL TOTAL CA: CPT

## 2024-11-07 PROCEDURE — 36415 COLL VENOUS BLD VENIPUNCTURE: CPT

## 2024-11-07 PROCEDURE — 85025 COMPLETE CBC W/AUTO DIFF WBC: CPT

## 2024-11-07 PROCEDURE — 2580000003 HC RX 258: Performed by: NURSE PRACTITIONER

## 2024-11-07 PROCEDURE — 2580000003 HC RX 258: Performed by: HOSPITALIST

## 2024-11-07 PROCEDURE — 6370000000 HC RX 637 (ALT 250 FOR IP): Performed by: HOSPITALIST

## 2024-11-07 PROCEDURE — 6370000000 HC RX 637 (ALT 250 FOR IP): Performed by: FAMILY MEDICINE

## 2024-11-07 PROCEDURE — 82962 GLUCOSE BLOOD TEST: CPT

## 2024-11-07 RX ORDER — ASPIRIN 81 MG/1
81 TABLET, CHEWABLE ORAL DAILY
Qty: 30 TABLET | Refills: 3 | Status: SHIPPED | OUTPATIENT
Start: 2024-11-07

## 2024-11-07 RX ORDER — GLIPIZIDE 2.5 MG/1
2.5 TABLET ORAL
Qty: 60 TABLET | Refills: 3 | Status: SHIPPED | OUTPATIENT
Start: 2024-11-07

## 2024-11-07 RX ORDER — 0.9 % SODIUM CHLORIDE 0.9 %
500 INTRAVENOUS SOLUTION INTRAVENOUS ONCE
Status: COMPLETED | OUTPATIENT
Start: 2024-11-07 | End: 2024-11-07

## 2024-11-07 RX ORDER — METFORMIN HYDROCHLORIDE 500 MG/1
1000 TABLET, EXTENDED RELEASE ORAL 2 TIMES DAILY WITH MEALS
Qty: 30 TABLET | Refills: 3 | Status: SHIPPED | OUTPATIENT
Start: 2024-11-07

## 2024-11-07 RX ORDER — 0.9 % SODIUM CHLORIDE 0.9 %
1000 INTRAVENOUS SOLUTION INTRAVENOUS ONCE
Status: COMPLETED | OUTPATIENT
Start: 2024-11-07 | End: 2024-11-07

## 2024-11-07 RX ORDER — LEVOTHYROXINE SODIUM 100 UG/1
100 TABLET ORAL
Qty: 30 TABLET | Refills: 3 | Status: SHIPPED | OUTPATIENT
Start: 2024-11-08

## 2024-11-07 RX ADMIN — PANTOPRAZOLE SODIUM 40 MG: 40 TABLET, DELAYED RELEASE ORAL at 06:21

## 2024-11-07 RX ADMIN — SODIUM CHLORIDE 1000 ML: 9 INJECTION, SOLUTION INTRAVENOUS at 08:21

## 2024-11-07 RX ADMIN — SODIUM CHLORIDE 500 ML: 9 INJECTION, SOLUTION INTRAVENOUS at 02:17

## 2024-11-07 RX ADMIN — METFORMIN HYDROCHLORIDE 1000 MG: 500 TABLET, EXTENDED RELEASE ORAL at 07:49

## 2024-11-07 RX ADMIN — ASPIRIN 81 MG 81 MG: 81 TABLET ORAL at 07:49

## 2024-11-07 RX ADMIN — LEVOTHYROXINE SODIUM 100 MCG: 0.05 TABLET ORAL at 06:21

## 2024-11-07 RX ADMIN — INSULIN LISPRO 2 UNITS: 100 INJECTION, SOLUTION INTRAVENOUS; SUBCUTANEOUS at 07:49

## 2024-11-07 RX ADMIN — GLIPIZIDE 2.5 MG: 5 TABLET ORAL at 07:49

## 2024-11-07 ASSESSMENT — PAIN SCALES - GENERAL: PAINLEVEL_OUTOF10: 0

## 2024-11-07 NOTE — CARE COORDINATION
Pt d/c home this day per MD. [facility] for HH with RN, PT, OT set up. Attempted SF, but no response. Outpt vestibular order sent for therapy as well. NICHO IL completed and pt agrees with POC for d/c. States daughter will transport home.          11/07/24 1037   Service Assessment   Patient Orientation Alert and Oriented   Cognition Alert   History Provided By Patient   Discharge Planning   Type of Residence House   Living Arrangements Spouse/Significant Other;Children;Family Members   Potential Assistance Needed Home Care   DME Ordered? No   Potential Assistance Purchasing Medications No   Type of Home Care Services PT;OT;Nursing Services   Patient expects to be discharged to: House   Services At/After Discharge   Transition of Care Consult (CM Consult) Home Health   Internal Home Health No   Services At/After Discharge Home Health   Confirm Follow Up Transport Family   Condition of Participation: Discharge Planning   Freedom of Choice list was provided with basic dialogue that supports the patient's individualized plan of care/goals, treatment preferences, and shares the quality data associated with the providers?  Yes

## 2024-11-07 NOTE — DIABETES MGMT
Patient admitted with Dizziness. Blood glucose ranged 215-319 yesterday with patient receiving Lantus 23 units, Humalog 16 units, Glipizide 2.5 mg, and Metformin 2000 mg. Blood glucose this morning was 199. Creatinine 1.29. GFR 55. Reviewed patient current regimen: Glipizide 2.5 mg daily, Lantus 23 units HS, Humalog correctional insulin, and Metformin 1000 mg BID.    Reviewed effects of carbohydrates on blood glucose, the \"plate method\" of healthy meal planning, basics of healthy meal plan, Consistent Carbohydrate Diet, discussed the basics of carb counting and how to read a nutrition label.  Reviewed target goals for blood glucose and A1C. Educated patient regarding diabetic medications including mechanism of action, timing, and possible side effects. Patient verbalizes understanding of teaching.  Reviewed the importance of blood glucose monitoring to patient and how this will provide their primary care provider with more information to help them safely titrate their regimen. Recommended frequency of BID, daily and 2 hours after dinner, and to record in log book to take to primary care provider appointment. Discussed continuous glucose monitors.  Encouraged compliance with discharge regimen. Encouraged patient to continue to work on lifestyle modifications and to follow up with primary care provider (Adriana Vogt) for further titration of regimen. Patient verbalized understanding and voices no further questions regarding diabetes management at this time.

## 2024-11-07 NOTE — DISCHARGE SUMMARY
Hospitalist Discharge Summary   Admit Date:  2024  9:00 PM   DC Note date: 2024  Name:  Dennis Santos   Age:  84 y.o.  Sex:  male  :  1940   MRN:  [id number]   Room:  [address] [place] [address]  PCP:  Adriana Vogt MD    Presenting Complaint: Dizziness     Initial Admission Diagnosis: Dizziness [R42]  Acute posterior circulation transient ischemic attack [G45.8]     Problem List for this Hospitalization (present on admission):    Principal Problem:    Dizziness  Active Problems:    MERT on CPAP    GERD (gastroesophageal reflux disease)    Acquired hypothyroidism    Dyslipidemia    BPH (benign prostatic hyperplasia)    Type 2 diabetes mellitus, with long-term current use of insulin ([facility])    Hypertension, essential, benign  Resolved Problems:    * No resolved hospital problems. *      Hospital Course:  Patient is an 84-year-old  male with history of hypertension, DM2 uncontrolled with A1c 11.2, dyslipidemia, MERT, GERD, hypothyroidism, BPH admitted on  in view of dizziness with associated symptoms of ataxia, nausea vomiting that started the night prior to admission.  Patient had a Code S evaluation in ER, teleneuro was consulted.  Patient was not a candidate for tPA.  CTA head and neck and CT head were unremarkable for acute pathology.  MRI brain was also negative for any acute CVA.  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  Neurology evaluated him as a follow-up, recommended to continue baby aspirin and statin.  Patient's symptoms etiology is likely related to inner ear pathology for which outpatient vestibular rehab will be arranged.  Patient symptoms have fully resolved, is currently asymptomatic and is participating well with PT OT.  Meclizine can be used as needed for symptoms.  Patient's blood glucose have been suboptimally controlled with A1c of 9.2, plan is to continue Lantus 25 units SQ nightly

## 2024-11-07 NOTE — PROGRESS NOTES
Pt education printed and presented. Opportunity for questions was given. Emphasized follow up care, medication changes, and fall prevention as it related to his diabetes and dizziness. Pt was satisfied with education. Home health and vestibular therapy to follow up with him at home. Family picked him up to be taken home.

## 2024-11-13 NOTE — ED NOTES
Case management contact made with pt post discharge to inquire about follow up care. Pt has FU with PCP 11/15.       Julia Lowe, RN  11/13/24 [id number]

## 2024-11-14 ENCOUNTER — HOSPITAL ENCOUNTER (EMERGENCY)
Age: 84
Discharge: HOME | End: 2024-11-14
Attending: EMERGENCY MEDICINE
Payer: MEDICARE

## 2024-11-14 VITALS
OXYGEN SATURATION: 98 % | DIASTOLIC BLOOD PRESSURE: 81 MMHG | HEIGHT: 71 IN | TEMPERATURE: 97.5 F | HEART RATE: 62 BPM | RESPIRATION RATE: 18 BRPM | BODY MASS INDEX: 28 KG/M2 | WEIGHT: 200 LBS | SYSTOLIC BLOOD PRESSURE: 150 MMHG

## 2024-11-14 DIAGNOSIS — H81.10 BENIGN PAROXYSMAL POSITIONAL VERTIGO, UNSPECIFIED LATERALITY: Primary | ICD-10-CM

## 2024-11-14 PROCEDURE — 99283 EMERGENCY DEPT VISIT LOW MDM: CPT

## 2024-11-14 PROCEDURE — 6370000000 HC RX 637 (ALT 250 FOR IP): Performed by: EMERGENCY MEDICINE

## 2024-11-14 RX ORDER — MECLIZINE HYDROCHLORIDE 25 MG/1
25 TABLET ORAL
Status: COMPLETED | OUTPATIENT
Start: 2024-11-14 | End: 2024-11-14

## 2024-11-14 RX ADMIN — MECLIZINE HYDROCHLORIDE 25 MG: 25 TABLET ORAL at 17:03

## 2024-11-14 ASSESSMENT — ENCOUNTER SYMPTOMS
NAUSEA: 1
SORE THROAT: 0
ABDOMINAL PAIN: 0
SHORTNESS OF BREATH: 0
VOMITING: 0
BACK PAIN: 0
COUGH: 0

## 2024-11-14 NOTE — ED TRIAGE NOTES
Pt arrives via [facility] from home with complaint of dizziness, and nausea x 40 minutes. Diagnosed with vertigo at D 11/7. VSS . 4 mg zofran given for nausea by EMS

## 2024-11-14 NOTE — ED PROVIDER NOTES
and dry.   Neurological:      General: No focal deficit present.      Mental Status: He is alert and oriented to person, place, and time.      Cranial Nerves: No cranial nerve deficit.      Sensory: No sensory deficit.      Motor: No weakness.      Coordination: Coordination normal.   Psychiatric:         Behavior: Behavior normal.          No orders of the defined types were placed in this encounter.      Procedures    Results Include:    No results found for this or any previous visit (from the past 24 hour(s)).       No orders to display        NIH Stroke Scale  Interval: Baseline  Level of Consciousness (1a): Alert  LOC Questions (1b): Answers both correctly  LOC Commands (1c): Performs both tasks correctly  Best Gaze (2): Normal  Visual (3): No visual loss  Facial Palsy (4): Normal symmetrical movement  Motor Arm, Left (5a): No drift  Motor Arm, Right (5b): No drift  Motor Leg, Left (6a): No drift  Motor Leg, Right (6b): No drift  Limb Ataxia (7): Absent  Sensory (8): Normal  Best Language (9): No aphasia  Dysarthria (10): Normal  Extinction and Inattention (11): No abnormality  Total: 0           ED Course as of 11/14/24 1919   Thu Nov 14, 2024 1915 Patient's dizziness resolved and he was able to ambulate steadily around the ED.  I explained the plan to patient and son and they are comfortable with discharge. [CW]      ED Course User Index  [CW] Edy Mckeon MD       I have considered all emergent medical conditions that could have caused patient's presentation to the emergency department today.  Based on their history, physical, and thorough evaluation, I have excluded all emergent medical conditions that require any further evaluation today in the ED or hospital.  I feel that the patient is safe for discharge.  The diagnosis and plan as well as the results of any testing (if done) and treatments (if done) today in the emergency department were communicated to the patient and/or their

## 2024-12-12 ENCOUNTER — APPOINTMENT (OUTPATIENT)
Dept: URBAN - METROPOLITAN AREA CLINIC 329 | Facility: CLINIC | Age: 84
Setting detail: DERMATOLOGY
End: 2024-12-12

## 2024-12-12 DIAGNOSIS — L81.4 OTHER MELANIN HYPERPIGMENTATION: ICD-10-CM

## 2024-12-12 DIAGNOSIS — D485 NEOPLASM OF UNCERTAIN BEHAVIOR OF SKIN: ICD-10-CM

## 2024-12-12 DIAGNOSIS — L82.1 OTHER SEBORRHEIC KERATOSIS: ICD-10-CM

## 2024-12-12 DIAGNOSIS — Z71.89 OTHER SPECIFIED COUNSELING: ICD-10-CM

## 2024-12-12 DIAGNOSIS — L57.0 ACTINIC KERATOSIS: ICD-10-CM | Status: INADEQUATELY CONTROLLED

## 2024-12-12 DIAGNOSIS — D18.0 HEMANGIOMA: ICD-10-CM

## 2024-12-12 DIAGNOSIS — L57.8 OTHER SKIN CHANGES DUE TO CHRONIC EXPOSURE TO NONIONIZING RADIATION: ICD-10-CM | Status: STABLE

## 2024-12-12 DIAGNOSIS — D22 MELANOCYTIC NEVI: ICD-10-CM

## 2024-12-12 DIAGNOSIS — Z12.83 ENCOUNTER FOR SCREENING FOR MALIGNANT NEOPLASM OF SKIN: ICD-10-CM

## 2024-12-12 PROBLEM — D22.62 MELANOCYTIC NEVI OF LEFT UPPER LIMB, INCLUDING SHOULDER: Status: ACTIVE | Noted: 2024-12-12

## 2024-12-12 PROBLEM — D18.01 HEMANGIOMA OF SKIN AND SUBCUTANEOUS TISSUE: Status: ACTIVE | Noted: 2024-12-12

## 2024-12-12 PROBLEM — D22.61 MELANOCYTIC NEVI OF RIGHT UPPER LIMB, INCLUDING SHOULDER: Status: ACTIVE | Noted: 2024-12-12

## 2024-12-12 PROBLEM — D22.5 MELANOCYTIC NEVI OF TRUNK: Status: ACTIVE | Noted: 2024-12-12

## 2024-12-12 PROBLEM — D48.5 NEOPLASM OF UNCERTAIN BEHAVIOR OF SKIN: Status: ACTIVE | Noted: 2024-12-12

## 2024-12-12 PROCEDURE — ? BIOPSY BY SHAVE METHOD

## 2024-12-12 PROCEDURE — ? COUNSELING

## 2024-12-12 PROCEDURE — ? SUNSCREEN RECOMMENDATIONS

## 2024-12-12 PROCEDURE — ? TREATMENT REGIMEN

## 2024-12-12 PROCEDURE — 17004 DESTROY PREMAL LESIONS 15/>: CPT

## 2024-12-12 PROCEDURE — ? LIQUID NITROGEN

## 2024-12-12 PROCEDURE — 99213 OFFICE O/P EST LOW 20 MIN: CPT | Mod: 25

## 2024-12-12 PROCEDURE — ? FULL BODY SKIN EXAM - DECLINED

## 2024-12-12 PROCEDURE — 11103 TANGNTL BX SKIN EA SEP/ADDL: CPT

## 2024-12-12 PROCEDURE — 11102 TANGNTL BX SKIN SINGLE LES: CPT | Mod: 59

## 2024-12-12 ASSESSMENT — LOCATION DETAILED DESCRIPTION DERM
LOCATION DETAILED: RIGHT SUPERIOR MEDIAL MIDBACK
LOCATION DETAILED: LEFT DISTAL DORSAL FOREARM
LOCATION DETAILED: LEFT SUPERIOR MEDIAL FOREHEAD
LOCATION DETAILED: LEFT INFERIOR HELIX
LOCATION DETAILED: RIGHT PROXIMAL DORSAL FOREARM
LOCATION DETAILED: LEFT MEDIAL UPPER BACK
LOCATION DETAILED: STERNUM
LOCATION DETAILED: LEFT SCAPHA
LOCATION DETAILED: LEFT PROXIMAL DORSAL FOREARM
LOCATION DETAILED: LEFT INFERIOR TEMPLE
LOCATION DETAILED: LEFT LATERAL MALAR CHEEK
LOCATION DETAILED: RIGHT ANTITRAGUS
LOCATION DETAILED: RIGHT SUPERIOR MEDIAL LOWER BACK
LOCATION DETAILED: LEFT INFERIOR LATERAL MALAR CHEEK
LOCATION DETAILED: SUPERIOR MID FOREHEAD
LOCATION DETAILED: EPIGASTRIC SKIN
LOCATION DETAILED: RIGHT SUPERIOR MEDIAL UPPER BACK
LOCATION DETAILED: LEFT INFERIOR LATERAL FOREHEAD
LOCATION DETAILED: LEFT SUPERIOR PARIETAL SCALP
LOCATION DETAILED: LEFT MEDIAL FRONTAL SCALP
LOCATION DETAILED: LEFT SUPERIOR MEDIAL MALAR CHEEK
LOCATION DETAILED: RIGHT SCAPHA
LOCATION DETAILED: RIGHT INFERIOR LATERAL MALAR CHEEK
LOCATION DETAILED: RIGHT LATERAL MALAR CHEEK
LOCATION DETAILED: LEFT DISTAL RADIAL DORSAL FOREARM
LOCATION DETAILED: RIGHT SUPERIOR HELIX
LOCATION DETAILED: LEFT CENTRAL FRONTAL SCALP
LOCATION DETAILED: LEFT INFERIOR CENTRAL MALAR CHEEK
LOCATION DETAILED: LEFT SUPERIOR FOREHEAD
LOCATION DETAILED: LEFT INFERIOR UPPER BACK
LOCATION DETAILED: PERIUMBILICAL SKIN
LOCATION DETAILED: MEDIAL FRONTAL SCALP
LOCATION DETAILED: LEFT MID TEMPLE

## 2024-12-12 ASSESSMENT — LOCATION SIMPLE DESCRIPTION DERM
LOCATION SIMPLE: CHEST
LOCATION SIMPLE: RIGHT CHEEK
LOCATION SIMPLE: LEFT FOREARM
LOCATION SIMPLE: RIGHT UPPER BACK
LOCATION SIMPLE: SUPERIOR FOREHEAD
LOCATION SIMPLE: LEFT TEMPLE
LOCATION SIMPLE: LEFT FOREHEAD
LOCATION SIMPLE: ABDOMEN
LOCATION SIMPLE: RIGHT EAR
LOCATION SIMPLE: LEFT CHEEK
LOCATION SIMPLE: FRONTAL SCALP
LOCATION SIMPLE: LEFT SCALP
LOCATION SIMPLE: RIGHT LOWER BACK
LOCATION SIMPLE: LEFT EAR
LOCATION SIMPLE: LEFT UPPER BACK
LOCATION SIMPLE: RIGHT FOREARM
LOCATION SIMPLE: SCALP

## 2024-12-12 ASSESSMENT — LOCATION ZONE DERM
LOCATION ZONE: SCALP
LOCATION ZONE: FACE
LOCATION ZONE: ARM
LOCATION ZONE: TRUNK
LOCATION ZONE: EAR

## 2024-12-12 NOTE — PROCEDURE: BIOPSY BY SHAVE METHOD
Detail Level: Detailed
Depth Of Biopsy: dermis
Was A Bandage Applied: Yes
Size Of Lesion In Cm: 0.7
X Size Of Lesion In Cm: 0
Biopsy Type: H and E
Biopsy Method: Dermablade
Anesthesia Type: 1% lidocaine with epinephrine
Anesthesia Volume In Cc: 0.5
Hemostasis: Drysol
Wound Care: Petrolatum
Dressing: bandage
Destruction After The Procedure: No
Type Of Destruction Used: Curettage
Curettage Text: The wound bed was treated with curettage after the biopsy was performed.
Cryotherapy Text: The wound bed was treated with cryotherapy after the biopsy was performed.
Electrodesiccation Text: The wound bed was treated with electrodesiccation after the biopsy was performed.
Electrodesiccation And Curettage Text: The wound bed was treated with electrodesiccation and curettage after the biopsy was performed.
Silver Nitrate Text: The wound bed was treated with silver nitrate after the biopsy was performed.
Lab: 6
Lab Facility: 3
Medical Necessity Information: It is in your best interest to select a reason for this procedure from the list below. All of these items fulfill various CMS LCD requirements except the new and changing color options.
Consent was obtained and risks were reviewed including but not limited to scarring, infection, bleeding, scabbing, incomplete removal, nerve damage and allergy to anesthesia.
Post-Care Instructions: I reviewed with the patient in detail post-care instructions. Patient is to keep the biopsy site dry overnight, and then apply bacitracin twice daily until healed. Patient may apply hydrogen peroxide soaks to remove any crusting.
Notification Instructions: Patient will be notified of biopsy results. However, patient instructed to call the office if not contacted within 2 weeks.
Billing Type: Third-Party Bill
Information: Selecting Yes will display possible errors in your note based on the variables you have selected. This validation is only offered as a suggestion for you. PLEASE NOTE THAT THE VALIDATION TEXT WILL BE REMOVED WHEN YOU FINALIZE YOUR NOTE. IF YOU WANT TO FAX A PRELIMINARY NOTE YOU WILL NEED TO TOGGLE THIS TO 'NO' IF YOU DO NOT WANT IT IN YOUR FAXED NOTE.

## 2024-12-12 NOTE — HPI: SKIN LESION
What Type Of Note Output Would You Prefer (Optional)?: Bullet Format
How Severe Is Your Skin Lesion?: mild
Is This A New Presentation, Or A Follow-Up?: Skin Lesions
Additional History: Patient states that he has some scaly spots on his face.

## 2025-01-21 ENCOUNTER — APPOINTMENT (OUTPATIENT)
Dept: URBAN - METROPOLITAN AREA CLINIC 329 | Facility: CLINIC | Age: 85
Setting detail: DERMATOLOGY
End: 2025-01-21

## 2025-01-21 PROBLEM — D04.39 CARCINOMA IN SITU OF SKIN OF OTHER PARTS OF FACE: Status: ACTIVE | Noted: 2025-01-21

## 2025-01-21 PROBLEM — D04.62 CARCINOMA IN SITU OF SKIN OF LEFT UPPER LIMB, INCLUDING SHOULDER: Status: ACTIVE | Noted: 2025-01-21

## 2025-01-21 PROCEDURE — ? CURETTAGE AND DESTRUCTION

## 2025-01-21 PROCEDURE — 17283 DSTR MAL LS F/E/E/N/L/M2.1-3: CPT

## 2025-01-21 PROCEDURE — 17262 DSTRJ MAL LES T/A/L 1.1-2.0: CPT

## 2025-01-21 NOTE — HPI: PROCEDURE (ELECTRODESSICATION AND CURETTAGE)
[id number]: previous_biopsy_has_been_previously_biopsied
Has The Growth Been Previously Biopsied?: has been previously biopsied

## 2025-01-21 NOTE — PROCEDURE: CURETTAGE AND DESTRUCTION
Body Location Override (Optional - Billing Will Still Be Based On Selected Body Map Location If Applicable): left superior forehead
Detail Level: Detailed
Number Of Curettages: 3
Size Of Lesion In Cm: 0.9
Size Of Lesion After Curettage: 2.2
Add Intralesional Injection: No
Concentration (Mg/Ml Or Millions Of Plaque Forming Units/Cc): 0.01
Total Volume (Ccs): 1
Anesthesia Type: 1% lidocaine with epinephrine
Cautery Type: electrodesiccation
What Was Performed First?: Curettage
Final Size Statement: The size of the lesion after curettage was
Additional Information: (Optional): The wound was cleaned, and a pressure dressing was applied.  The patient received detailed post-op instructions.
Consent was obtained from the patient. The risks, benefits and alternatives to therapy were discussed in detail. Specifically, the risks of infection, scarring, bleeding, prolonged wound healing, nerve injury, incomplete removal, allergy to anesthesia and recurrence were addressed. Alternatives to ED&C, such as: surgical removal and XRT were also discussed.  Prior to the procedure, the treatment site was clearly identified and confirmed by the patient.
Post-Care Instructions: I reviewed with the patient in detail post-care instructions. Patient is to keep the area dry for 48 hours, and not to engage in any swimming until the area is healed. Should the patient develop any fevers, chills, bleeding, severe pain patient will contact the office immediately.
Bill As A Line Item Or As Units: Line Item
Body Location Override (Optional - Billing Will Still Be Based On Selected Body Map Location If Applicable): left proximal dorsal forearm
Size Of Lesion In Cm: 0.8
Size Of Lesion After Curettage: 1.8

## 2025-07-06 ENCOUNTER — HOSPITAL ENCOUNTER (OUTPATIENT)
Age: 85
Setting detail: OBSERVATION
Discharge: HOME | End: 2025-07-07
Attending: EMERGENCY MEDICINE | Admitting: INTERNAL MEDICINE
Payer: MEDICARE

## 2025-07-06 ENCOUNTER — APPOINTMENT (OUTPATIENT)
Dept: GENERAL RADIOLOGY | Age: 85
End: 2025-07-06
Payer: MEDICARE

## 2025-07-06 DIAGNOSIS — R53.1 GENERALIZED WEAKNESS: ICD-10-CM

## 2025-07-06 DIAGNOSIS — U07.1 COVID-19: Primary | ICD-10-CM

## 2025-07-06 LAB
ALBUMIN SERPL-MCNC: 3.3 G/DL (ref 3.2–4.6)
ALBUMIN/GLOB SERPL: 1.1 (ref 1–1.9)
ALP SERPL-CCNC: 109 U/L (ref 40–129)
ALT SERPL-CCNC: 40 U/L (ref 8–55)
ANION GAP SERPL CALC-SCNC: 14 MMOL/L (ref 7–16)
AST SERPL-CCNC: 51 U/L (ref 15–37)
BASOPHILS # BLD: 0.03 K/UL (ref 0–0.2)
BASOPHILS NFR BLD: 0.4 % (ref 0–2)
BILIRUB SERPL-MCNC: 1.8 MG/DL (ref 0–1.2)
BUN SERPL-MCNC: 16 MG/DL (ref 8–23)
CALCIUM SERPL-MCNC: 9.2 MG/DL (ref 8.8–10.2)
CHLORIDE SERPL-SCNC: 99 MMOL/L (ref 98–107)
CO2 SERPL-SCNC: 22 MMOL/L (ref 20–29)
CREAT SERPL-MCNC: 1.12 MG/DL (ref 0.8–1.3)
DIFFERENTIAL METHOD BLD: ABNORMAL
EOSINOPHIL # BLD: 0.03 K/UL (ref 0–0.8)
EOSINOPHIL NFR BLD: 0.4 % (ref 0.5–7.8)
ERYTHROCYTE [DISTWIDTH] IN BLOOD BY AUTOMATED COUNT: 13 % (ref 11.9–14.6)
FLUAV RNA SPEC QL NAA+PROBE: NOT DETECTED
FLUBV RNA SPEC QL NAA+PROBE: NOT DETECTED
GLOBULIN SER CALC-MCNC: 3.1 G/DL (ref 2.3–3.5)
GLUCOSE BLD STRIP.AUTO-MCNC: 219 MG/DL (ref 65–100)
GLUCOSE SERPL-MCNC: 94 MG/DL (ref 70–99)
HCT VFR BLD AUTO: 42.3 % (ref 41.1–50.3)
HGB BLD-MCNC: 14.6 G/DL (ref 13.6–17.2)
IMM GRANULOCYTES # BLD AUTO: 0.02 K/UL (ref 0–0.5)
IMM GRANULOCYTES NFR BLD AUTO: 0.2 % (ref 0–5)
LACTATE SERPL-SCNC: 1.1 MMOL/L (ref 0.5–2)
LACTATE SERPL-SCNC: 2 MMOL/L (ref 0.5–2)
LYMPHOCYTES # BLD: 0.45 K/UL (ref 0.5–4.6)
LYMPHOCYTES NFR BLD: 5.6 % (ref 13–44)
MCH RBC QN AUTO: 31.7 PG (ref 26.1–32.9)
MCHC RBC AUTO-ENTMCNC: 34.5 G/DL (ref 31.4–35)
MCV RBC AUTO: 91.8 FL (ref 82–102)
MONOCYTES # BLD: 0.45 K/UL (ref 0.1–1.3)
MONOCYTES NFR BLD: 5.6 % (ref 4–12)
NEUTS SEG # BLD: 7.04 K/UL (ref 1.7–8.2)
NEUTS SEG NFR BLD: 87.8 % (ref 43–78)
NRBC # BLD: 0 K/UL (ref 0–0.2)
PLATELET # BLD AUTO: 86 K/UL (ref 150–450)
PMV BLD AUTO: 10.2 FL (ref 9.4–12.3)
POTASSIUM SERPL-SCNC: 3.9 MMOL/L (ref 3.5–5.1)
PROCALCITONIN SERPL-MCNC: 0.09 NG/ML (ref 0–0.1)
PROT SERPL-MCNC: 6.4 G/DL (ref 6.3–8.2)
RBC # BLD AUTO: 4.61 M/UL (ref 4.23–5.6)
SARS-COV-2 RDRP RESP QL NAA+PROBE: DETECTED
SERVICE CMNT-IMP: ABNORMAL
SODIUM SERPL-SCNC: 135 MMOL/L (ref 136–145)
SOURCE: ABNORMAL
WBC # BLD AUTO: 8 K/UL (ref 4.3–11.1)

## 2025-07-06 PROCEDURE — 85025 COMPLETE CBC W/AUTO DIFF WBC: CPT

## 2025-07-06 PROCEDURE — 2500000003 HC RX 250 WO HCPCS: Performed by: INTERNAL MEDICINE

## 2025-07-06 PROCEDURE — 87636 SARSCOV2 & INF A&B AMP PRB: CPT

## 2025-07-06 PROCEDURE — 2580000003 HC RX 258: Performed by: INTERNAL MEDICINE

## 2025-07-06 PROCEDURE — 80053 COMPREHEN METABOLIC PANEL: CPT

## 2025-07-06 PROCEDURE — G0378 HOSPITAL OBSERVATION PER HR: HCPCS

## 2025-07-06 PROCEDURE — 6360000002 HC RX W HCPCS: Performed by: EMERGENCY MEDICINE

## 2025-07-06 PROCEDURE — 93005 ELECTROCARDIOGRAM TRACING: CPT | Performed by: EMERGENCY MEDICINE

## 2025-07-06 PROCEDURE — 6370000000 HC RX 637 (ALT 250 FOR IP): Performed by: INTERNAL MEDICINE

## 2025-07-06 PROCEDURE — 96365 THER/PROPH/DIAG IV INF INIT: CPT

## 2025-07-06 PROCEDURE — 83605 ASSAY OF LACTIC ACID: CPT

## 2025-07-06 PROCEDURE — 87040 BLOOD CULTURE FOR BACTERIA: CPT

## 2025-07-06 PROCEDURE — 94762 N-INVAS EAR/PLS OXIMTRY CONT: CPT

## 2025-07-06 PROCEDURE — 94760 N-INVAS EAR/PLS OXIMETRY 1: CPT

## 2025-07-06 PROCEDURE — 2580000003 HC RX 258: Performed by: EMERGENCY MEDICINE

## 2025-07-06 PROCEDURE — 71045 X-RAY EXAM CHEST 1 VIEW: CPT

## 2025-07-06 PROCEDURE — 6370000000 HC RX 637 (ALT 250 FOR IP): Performed by: EMERGENCY MEDICINE

## 2025-07-06 PROCEDURE — 84145 PROCALCITONIN (PCT): CPT

## 2025-07-06 PROCEDURE — 99285 EMERGENCY DEPT VISIT HI MDM: CPT

## 2025-07-06 PROCEDURE — 82962 GLUCOSE BLOOD TEST: CPT

## 2025-07-06 RX ORDER — ONDANSETRON 2 MG/ML
4 INJECTION INTRAMUSCULAR; INTRAVENOUS EVERY 6 HOURS PRN
Status: DISCONTINUED | OUTPATIENT
Start: 2025-07-06 | End: 2025-07-07 | Stop reason: HOSPADM

## 2025-07-06 RX ORDER — 0.9 % SODIUM CHLORIDE 0.9 %
1000 INTRAVENOUS SOLUTION INTRAVENOUS ONCE
Status: COMPLETED | OUTPATIENT
Start: 2025-07-06 | End: 2025-07-06

## 2025-07-06 RX ORDER — INSULIN GLARGINE 100 [IU]/ML
15 INJECTION, SOLUTION SUBCUTANEOUS DAILY
Status: DISCONTINUED | OUTPATIENT
Start: 2025-07-06 | End: 2025-07-07 | Stop reason: HOSPADM

## 2025-07-06 RX ORDER — SODIUM CHLORIDE 9 MG/ML
INJECTION, SOLUTION INTRAVENOUS CONTINUOUS
Status: ACTIVE | OUTPATIENT
Start: 2025-07-06 | End: 2025-07-07

## 2025-07-06 RX ORDER — DEXTROSE MONOHYDRATE 100 MG/ML
INJECTION, SOLUTION INTRAVENOUS CONTINUOUS PRN
Status: DISCONTINUED | OUTPATIENT
Start: 2025-07-06 | End: 2025-07-07 | Stop reason: HOSPADM

## 2025-07-06 RX ORDER — GUAIFENESIN/DEXTROMETHORPHAN 100-10MG/5
5 SYRUP ORAL EVERY 4 HOURS PRN
Status: DISCONTINUED | OUTPATIENT
Start: 2025-07-06 | End: 2025-07-07 | Stop reason: HOSPADM

## 2025-07-06 RX ORDER — ROPINIROLE 1 MG/1
2 TABLET, FILM COATED ORAL EVERY EVENING
Status: DISCONTINUED | OUTPATIENT
Start: 2025-07-06 | End: 2025-07-07 | Stop reason: HOSPADM

## 2025-07-06 RX ORDER — INSULIN LISPRO 100 [IU]/ML
0-8 INJECTION, SOLUTION INTRAVENOUS; SUBCUTANEOUS
Status: DISCONTINUED | OUTPATIENT
Start: 2025-07-06 | End: 2025-07-07 | Stop reason: HOSPADM

## 2025-07-06 RX ORDER — ASPIRIN 81 MG/1
81 TABLET, CHEWABLE ORAL DAILY
Status: DISCONTINUED | OUTPATIENT
Start: 2025-07-07 | End: 2025-07-07 | Stop reason: HOSPADM

## 2025-07-06 RX ORDER — GABAPENTIN 300 MG/1
300 CAPSULE ORAL NIGHTLY
Status: DISCONTINUED | OUTPATIENT
Start: 2025-07-06 | End: 2025-07-07 | Stop reason: HOSPADM

## 2025-07-06 RX ORDER — PANTOPRAZOLE SODIUM 40 MG/1
40 TABLET, DELAYED RELEASE ORAL
Status: DISCONTINUED | OUTPATIENT
Start: 2025-07-06 | End: 2025-07-07 | Stop reason: HOSPADM

## 2025-07-06 RX ORDER — SODIUM CHLORIDE 0.9 % (FLUSH) 0.9 %
5-40 SYRINGE (ML) INJECTION PRN
Status: DISCONTINUED | OUTPATIENT
Start: 2025-07-06 | End: 2025-07-07 | Stop reason: HOSPADM

## 2025-07-06 RX ORDER — PRAVASTATIN SODIUM 20 MG
40 TABLET ORAL NIGHTLY
Status: DISCONTINUED | OUTPATIENT
Start: 2025-07-06 | End: 2025-07-07 | Stop reason: HOSPADM

## 2025-07-06 RX ORDER — SODIUM CHLORIDE 0.9 % (FLUSH) 0.9 %
5-40 SYRINGE (ML) INJECTION EVERY 12 HOURS SCHEDULED
Status: DISCONTINUED | OUTPATIENT
Start: 2025-07-06 | End: 2025-07-07 | Stop reason: HOSPADM

## 2025-07-06 RX ORDER — ACETAMINOPHEN 500 MG
500 TABLET ORAL EVERY 6 HOURS PRN
Status: DISCONTINUED | OUTPATIENT
Start: 2025-07-06 | End: 2025-07-07 | Stop reason: HOSPADM

## 2025-07-06 RX ORDER — MECLIZINE HYDROCHLORIDE 25 MG/1
25 TABLET ORAL 3 TIMES DAILY PRN
Status: DISCONTINUED | OUTPATIENT
Start: 2025-07-06 | End: 2025-07-07 | Stop reason: HOSPADM

## 2025-07-06 RX ORDER — POLYETHYLENE GLYCOL 3350 17 G/17G
17 POWDER, FOR SOLUTION ORAL DAILY PRN
Status: DISCONTINUED | OUTPATIENT
Start: 2025-07-06 | End: 2025-07-07 | Stop reason: HOSPADM

## 2025-07-06 RX ORDER — ACETAMINOPHEN 325 MG/1
650 TABLET ORAL
Status: COMPLETED | OUTPATIENT
Start: 2025-07-06 | End: 2025-07-06

## 2025-07-06 RX ORDER — ROPINIROLE 1 MG/1
2 TABLET, FILM COATED ORAL EVERY EVENING
Status: DISCONTINUED | OUTPATIENT
Start: 2025-07-07 | End: 2025-07-06

## 2025-07-06 RX ORDER — LEVOTHYROXINE SODIUM 125 UG/1
125 TABLET ORAL
Status: DISCONTINUED | OUTPATIENT
Start: 2025-07-07 | End: 2025-07-07 | Stop reason: HOSPADM

## 2025-07-06 RX ORDER — ENOXAPARIN SODIUM 100 MG/ML
40 INJECTION SUBCUTANEOUS DAILY
Status: DISCONTINUED | OUTPATIENT
Start: 2025-07-06 | End: 2025-07-06

## 2025-07-06 RX ORDER — ROPINIROLE 1 MG/1
2 TABLET, FILM COATED ORAL EVERY EVENING
Status: DISCONTINUED | OUTPATIENT
Start: 2025-07-06 | End: 2025-07-06

## 2025-07-06 RX ORDER — SODIUM CHLORIDE 9 MG/ML
INJECTION, SOLUTION INTRAVENOUS PRN
Status: DISCONTINUED | OUTPATIENT
Start: 2025-07-06 | End: 2025-07-07 | Stop reason: HOSPADM

## 2025-07-06 RX ORDER — ACETAMINOPHEN 650 MG/1
325 SUPPOSITORY RECTAL EVERY 6 HOURS PRN
Status: DISCONTINUED | OUTPATIENT
Start: 2025-07-06 | End: 2025-07-07 | Stop reason: HOSPADM

## 2025-07-06 RX ORDER — IBUPROFEN 600 MG/1
1 TABLET ORAL PRN
Status: DISCONTINUED | OUTPATIENT
Start: 2025-07-06 | End: 2025-07-07 | Stop reason: HOSPADM

## 2025-07-06 RX ORDER — ONDANSETRON 4 MG/1
4 TABLET, ORALLY DISINTEGRATING ORAL EVERY 8 HOURS PRN
Status: DISCONTINUED | OUTPATIENT
Start: 2025-07-06 | End: 2025-07-07 | Stop reason: HOSPADM

## 2025-07-06 RX ADMIN — SODIUM CHLORIDE, PRESERVATIVE FREE 10 ML: 5 INJECTION INTRAVENOUS at 21:29

## 2025-07-06 RX ADMIN — PRAVASTATIN SODIUM 40 MG: 20 TABLET ORAL at 21:15

## 2025-07-06 RX ADMIN — CEFEPIME 2000 MG: 2 INJECTION, POWDER, FOR SOLUTION INTRAVENOUS at 14:37

## 2025-07-06 RX ADMIN — INSULIN GLARGINE 15 UNITS: 100 INJECTION, SOLUTION SUBCUTANEOUS at 17:37

## 2025-07-06 RX ADMIN — SODIUM CHLORIDE: 9 INJECTION, SOLUTION INTRAVENOUS at 23:28

## 2025-07-06 RX ADMIN — GABAPENTIN 300 MG: 300 CAPSULE ORAL at 21:15

## 2025-07-06 RX ADMIN — INSULIN LISPRO 2 UNITS: 100 INJECTION, SOLUTION INTRAVENOUS; SUBCUTANEOUS at 21:28

## 2025-07-06 RX ADMIN — ACETAMINOPHEN 650 MG: 325 TABLET, FILM COATED ORAL at 14:32

## 2025-07-06 RX ADMIN — SODIUM CHLORIDE 1000 ML: 0.9 INJECTION, SOLUTION INTRAVENOUS at 14:37

## 2025-07-06 RX ADMIN — ROPINIROLE HYDROCHLORIDE 2 MG: 1 TABLET, FILM COATED ORAL at 21:26

## 2025-07-06 RX ADMIN — SODIUM CHLORIDE: 9 INJECTION, SOLUTION INTRAVENOUS at 16:57

## 2025-07-06 ASSESSMENT — PAIN SCALES - GENERAL
PAINLEVEL_OUTOF10: 0
PAINLEVEL_OUTOF10: 0

## 2025-07-06 NOTE — ED PROVIDER NOTES
[employer] Emergency Department Provider Note                   PCP:                Adriana Vogt MD               Age: 85 y.o.      Sex: male     MEDICAL DECISION MAKING  Complexity of Problems Addressed:   1 or more acute illness/injury that poses a threat to life or bodily function    Data Reviewed and Analyzed:  Category 1:    I have reviewed outside records from an external source for any pertinent PMH, ED visits, primary care visits, specialist visits, labs, EKG, and/or radiologic studies.  I reviewed external records: provider visit note from outside specialist.     Category 2:       I independently ordered and reviewed the labs.    I have reviewed the Radiologist interpretation of the radiologic studies. My independent interpretation of the chest x-ray showed no pneumonia.        The patients assessment required an independent historian: Patient's daughter.  The reason they were needed is important historical information not provided by the patient.    The patient was admitted and I have discussed patient management with the admitting provider.    Category 3:     Discussion of management or test interpretation:    MDM  Number of Diagnoses or Management Options  COVID-19  Generalized weakness  Diagnosis management comments: Patient presented for evaluation of generalized weakness for several days.  He was unable to get out of bed today.  He was running a fever when he arrived to the ED with a temperature of 101.  Otherwise his vital signs were normal.  His white count, lactate, and procalcitonin are all normal and no indication of severe sepsis.  His CMP showed no significant abnormalities.  His bilirubin was mildly elevated but no abdominal pain to suspect acute biliary process.  Patient's chest x-ray showed no acute findings.  His COVID test did come back positive.  Given his advanced age and the level of his weakness, the hospitalist was contacted for admission.          Dennis Santos is a 85 y.o. male

## 2025-07-06 NOTE — ED TRIAGE NOTES
Pt arrives via [facility] coming from home after daughter called out for sick person. , temp 101. Pt A&Ox4 but slightly confused.

## 2025-07-06 NOTE — H&P
Hospitalist History and Physical   Admit Date:  2025  2:09 PM   Name:  Dennis Santos   Age:  85 y.o.  Sex:  male  :  1940   MRN:  [id number]   Room:  [facility]/    Presenting/Chief Complaint: Altered Mental Status and Blood Infection     Reason(s) for Admission: COVID-19 [U07.1]     History of Present Illness:     Dennis Santos is an 85-year-old man with a history of diabetes with neuropathy, dyslipidemia, BPH, leukemia, GERD, and hypertension who presents to the ED with generalized weakness and fever.  As he is extremely hard of hearing, history is largely obtained from his daughter, Alcira, at bedside in the ED.  He felt extremely weak today.  He was unable to get up due to his extreme weakness.  He states that his feet have been feeling weak.  He has noted chills and fevers.  He denies chest pain.  He has had some cough.  Given symptoms, he came to the ED for further evaluation.    Open patient to the ED, he was febrile at 101 with heart rate 105, otherwise vitally stable and saturating well on room air.  Labs showed procalcitonin 0.09, lactic acid 2.0, white count 8.0, total bilirubin 1.8.  Chest x-ray showed no acute pulmonary process.  Rapid testing for COVID was positive.  He received a liter of sodium chloride, cefepime, and Tylenol in the ED.  The Hospitalist group was consulted for admission.    Assessment & Plan:     COVID  Patient with weakness and fevers.  He met sepsis criteria on presentation with tachycardia and fever.  No lactic acidosis or leukocytosis.  No oxygen requirement.  - Observation status  - COVID isolation precautions  - Supportive care as needed  - Monitor for oxygen requirement  - PT and OT consultation    Insulin-dependent type 2 diabetes  - Reduce dose home Lantus 25 units nightly  - Sliding scale while admitted  - Hypoglycemic protocol  - Holding home metformin and glipizide for now  -Continue home gabapentin 300 mg at bedtime    GERD  - Continue home Protonix 40 mg

## 2025-07-06 NOTE — ED NOTES
TRANSFER - OUT REPORT:    Verbal report given to Joan VERGARA on Dennis Santos  being transferred to [facility] for routine progression of patient care       Report consisted of patient's Situation, Background, Assessment and   Recommendations(SBAR).     Information from the following report(s) Nurse Handoff Report, ED Encounter Summary, ED SBAR, Adult Overview, Intake/Output, MAR, Recent Results, Quality Measures, and Neuro Assessment was reviewed with the receiving nurse.    [place] Fall Assessment:    Presents to emergency department  because of falls (Syncope, seizure, or loss of consciousness): No  Age > 70: Yes  Altered Mental Status, Intoxication with alcohol or substance confusion (Disorientation, impaired judgment, poor safety awaremess, or inability to follow instructions): Yes  Impaired Mobility: Ambulates or transfers with assistive devices or assistance; Unable to ambulate or transer.: Yes  Nursing Judgement: Yes          Lines:   Peripheral IV 07/06/25 Right Antecubital (Active)       Peripheral IV 07/06/25 Left Antecubital (Active)        Opportunity for questions and clarification was provided.      Patient transported with:  Registered Nurse

## 2025-07-07 VITALS
DIASTOLIC BLOOD PRESSURE: 66 MMHG | SYSTOLIC BLOOD PRESSURE: 91 MMHG | TEMPERATURE: 97.7 F | BODY MASS INDEX: 28 KG/M2 | OXYGEN SATURATION: 98 % | HEIGHT: 71 IN | RESPIRATION RATE: 18 BRPM | WEIGHT: 200 LBS | HEART RATE: 62 BPM

## 2025-07-07 LAB
25(OH)D3 SERPL-MCNC: 60 NG/ML (ref 30–100)
ALBUMIN SERPL-MCNC: 2.7 G/DL (ref 3.2–4.6)
ALBUMIN/GLOB SERPL: 1 (ref 1–1.9)
ALP SERPL-CCNC: 89 U/L (ref 40–129)
ALT SERPL-CCNC: 33 U/L (ref 8–55)
ANION GAP SERPL CALC-SCNC: 9 MMOL/L (ref 7–16)
AST SERPL-CCNC: 54 U/L (ref 15–37)
BASOPHILS # BLD: 0.03 K/UL (ref 0–0.2)
BASOPHILS NFR BLD: 0.5 % (ref 0–2)
BILIRUB SERPL-MCNC: 1.3 MG/DL (ref 0–1.2)
BUN SERPL-MCNC: 17 MG/DL (ref 8–23)
CALCIUM SERPL-MCNC: 8.5 MG/DL (ref 8.8–10.2)
CHLORIDE SERPL-SCNC: 106 MMOL/L (ref 98–107)
CO2 SERPL-SCNC: 22 MMOL/L (ref 20–29)
CREAT SERPL-MCNC: 1.01 MG/DL (ref 0.8–1.3)
CRP SERPL-MCNC: 7.9 MG/DL (ref 0–0.4)
DIFFERENTIAL METHOD BLD: ABNORMAL
EKG ATRIAL RATE: 93 BPM
EKG DIAGNOSIS: NORMAL
EKG P AXIS: 60 DEGREES
EKG P-R INTERVAL: 142 MS
EKG Q-T INTERVAL: 348 MS
EKG QRS DURATION: 92 MS
EKG QTC CALCULATION (BAZETT): 433 MS
EKG R AXIS: 47 DEGREES
EKG T AXIS: 87 DEGREES
EKG VENTRICULAR RATE: 93 BPM
EOSINOPHIL # BLD: 0.05 K/UL (ref 0–0.8)
EOSINOPHIL NFR BLD: 0.9 % (ref 0.5–7.8)
ERYTHROCYTE [DISTWIDTH] IN BLOOD BY AUTOMATED COUNT: 13 % (ref 11.9–14.6)
EST. AVERAGE GLUCOSE BLD GHB EST-MCNC: 182 MG/DL
GLOBULIN SER CALC-MCNC: 2.8 G/DL (ref 2.3–3.5)
GLUCOSE BLD STRIP.AUTO-MCNC: 109 MG/DL (ref 65–100)
GLUCOSE BLD STRIP.AUTO-MCNC: 211 MG/DL (ref 65–100)
GLUCOSE BLD STRIP.AUTO-MCNC: 245 MG/DL (ref 65–100)
GLUCOSE BLD STRIP.AUTO-MCNC: 289 MG/DL (ref 65–100)
GLUCOSE SERPL-MCNC: 116 MG/DL (ref 70–99)
HBA1C MFR BLD: 8 % (ref 0–5.6)
HCT VFR BLD AUTO: 38.1 % (ref 41.1–50.3)
HGB BLD-MCNC: 13 G/DL (ref 13.6–17.2)
IMM GRANULOCYTES # BLD AUTO: 0.02 K/UL (ref 0–0.5)
IMM GRANULOCYTES NFR BLD AUTO: 0.4 % (ref 0–5)
LYMPHOCYTES # BLD: 0.63 K/UL (ref 0.5–4.6)
LYMPHOCYTES NFR BLD: 11.2 % (ref 13–44)
MCH RBC QN AUTO: 31.6 PG (ref 26.1–32.9)
MCHC RBC AUTO-ENTMCNC: 34.1 G/DL (ref 31.4–35)
MCV RBC AUTO: 92.7 FL (ref 82–102)
MONOCYTES # BLD: 0.38 K/UL (ref 0.1–1.3)
MONOCYTES NFR BLD: 6.7 % (ref 4–12)
NEUTS SEG # BLD: 4.54 K/UL (ref 1.7–8.2)
NEUTS SEG NFR BLD: 80.3 % (ref 43–78)
NRBC # BLD: 0 K/UL (ref 0–0.2)
PLATELET # BLD AUTO: 75 K/UL (ref 150–450)
PMV BLD AUTO: 10.2 FL (ref 9.4–12.3)
POTASSIUM SERPL-SCNC: 3.6 MMOL/L (ref 3.5–5.1)
PROT SERPL-MCNC: 5.5 G/DL (ref 6.3–8.2)
RBC # BLD AUTO: 4.11 M/UL (ref 4.23–5.6)
SERVICE CMNT-IMP: ABNORMAL
SODIUM SERPL-SCNC: 137 MMOL/L (ref 136–145)
WBC # BLD AUTO: 5.7 K/UL (ref 4.3–11.1)

## 2025-07-07 PROCEDURE — G0378 HOSPITAL OBSERVATION PER HR: HCPCS

## 2025-07-07 PROCEDURE — 97535 SELF CARE MNGMENT TRAINING: CPT

## 2025-07-07 PROCEDURE — 6370000000 HC RX 637 (ALT 250 FOR IP): Performed by: INTERNAL MEDICINE

## 2025-07-07 PROCEDURE — 36415 COLL VENOUS BLD VENIPUNCTURE: CPT

## 2025-07-07 PROCEDURE — 82962 GLUCOSE BLOOD TEST: CPT

## 2025-07-07 PROCEDURE — 97165 OT EVAL LOW COMPLEX 30 MIN: CPT

## 2025-07-07 PROCEDURE — 85025 COMPLETE CBC W/AUTO DIFF WBC: CPT

## 2025-07-07 PROCEDURE — 86140 C-REACTIVE PROTEIN: CPT

## 2025-07-07 PROCEDURE — 97161 PT EVAL LOW COMPLEX 20 MIN: CPT

## 2025-07-07 PROCEDURE — 82306 VITAMIN D 25 HYDROXY: CPT

## 2025-07-07 PROCEDURE — 80053 COMPREHEN METABOLIC PANEL: CPT

## 2025-07-07 PROCEDURE — 97530 THERAPEUTIC ACTIVITIES: CPT

## 2025-07-07 PROCEDURE — 83036 HEMOGLOBIN GLYCOSYLATED A1C: CPT

## 2025-07-07 PROCEDURE — 93010 ELECTROCARDIOGRAM REPORT: CPT | Performed by: INTERNAL MEDICINE

## 2025-07-07 RX ORDER — METFORMIN HYDROCHLORIDE 500 MG/1
1000 TABLET, EXTENDED RELEASE ORAL 2 TIMES DAILY WITH MEALS
Status: DISCONTINUED | OUTPATIENT
Start: 2025-07-07 | End: 2025-07-07 | Stop reason: HOSPADM

## 2025-07-07 RX ORDER — GLIPIZIDE 5 MG/1
2.5 TABLET ORAL
Status: DISCONTINUED | OUTPATIENT
Start: 2025-07-08 | End: 2025-07-07 | Stop reason: HOSPADM

## 2025-07-07 RX ORDER — INSULIN LISPRO 100 [IU]/ML
4 INJECTION, SOLUTION INTRAVENOUS; SUBCUTANEOUS ONCE
Status: COMPLETED | OUTPATIENT
Start: 2025-07-07 | End: 2025-07-07

## 2025-07-07 RX ADMIN — INSULIN LISPRO 4 UNITS: 100 INJECTION, SOLUTION INTRAVENOUS; SUBCUTANEOUS at 18:11

## 2025-07-07 RX ADMIN — LEVOTHYROXINE SODIUM 125 MCG: 0.12 TABLET ORAL at 05:54

## 2025-07-07 RX ADMIN — ACETAMINOPHEN 500 MG: 500 TABLET, FILM COATED ORAL at 00:29

## 2025-07-07 RX ADMIN — ROPINIROLE HYDROCHLORIDE 2 MG: 1 TABLET, FILM COATED ORAL at 18:11

## 2025-07-07 RX ADMIN — PANTOPRAZOLE SODIUM 40 MG: 40 TABLET, DELAYED RELEASE ORAL at 05:54

## 2025-07-07 RX ADMIN — ASPIRIN 81 MG: 81 TABLET, CHEWABLE ORAL at 08:27

## 2025-07-07 RX ADMIN — INSULIN LISPRO 2 UNITS: 100 INJECTION, SOLUTION INTRAVENOUS; SUBCUTANEOUS at 16:26

## 2025-07-07 RX ADMIN — PANTOPRAZOLE SODIUM 40 MG: 40 TABLET, DELAYED RELEASE ORAL at 18:10

## 2025-07-07 RX ADMIN — INSULIN GLARGINE 15 UNITS: 100 INJECTION, SOLUTION SUBCUTANEOUS at 08:30

## 2025-07-07 RX ADMIN — METFORMIN HYDROCHLORIDE 1000 MG: 500 TABLET, EXTENDED RELEASE ORAL at 18:10

## 2025-07-07 RX ADMIN — INSULIN LISPRO 2 UNITS: 100 INJECTION, SOLUTION INTRAVENOUS; SUBCUTANEOUS at 11:37

## 2025-07-07 NOTE — DISCHARGE SUMMARY
07/06/25 1451 -- 82 19 -- 98 %   07/06/25 1440 -- 88 13 139/69 96 %   07/06/25 1426 -- 90 10 -- 95 %   07/06/25 1415 -- -- -- 129/64 93 %   07/06/25 1400 (!) 101 °F (38.3 °C) (!) 105 16 -- --       Oxygen Therapy  SpO2: 98 %  Pulse Oximeter Device Mode: Intermittent  Pulse via Oximetry: 63 beats per minute  Pulse Oximeter Device Location: Left, Finger  O2 Device: None (Room air)    Estimated body mass index is 27.89 kg/m² as calculated from the following:    Height as of this encounter: 1.803 m (5' 11\").    Weight as of this encounter: 90.7 kg (200 lb).    Intake/Output Summary (Last 24 hours) at 7/7/2025 1224  Last data filed at 7/6/2025 2307  Gross per 24 hour   Intake 96.79 ml   Output 500 ml   Net -403.21 ml         Physical Exam:    General:          Well nourished.  Pleasant gentleman resting in NAD  Head:               Normocephalic, atraumatic  Eyes:               Sclerae appear normal.  Pupils equally round.  ENT:                Nares appear normal.  Moist oral mucosa  Neck:               No restricted ROM.  Trachea midline   CV:                  RRR.  No m/r/g.  No jugular venous distension.  Lungs:             CTAB.  No wheezing, rhonchi, or rales.  Symmetric expansion.  Abdomen:        Soft, nontender, nondistended.  Extremities:     No cyanosis or clubbing.  No edema  Skin:                No rashes.  Normal coloration.   Warm and dry.    Neuro:             Extremely hard of hearing.  CN II-XII otherwise grossly intact.  No other focal motor or sensory deficits noted.  Psych:             Normal mood and affect.        Time spent in patient discharge and coordination 37 minutes.      Signed:  Manny Rosas MD    Part of this note may have been written by using a voice dictation software.  The note has been proof read but may still contain some grammatical/other typographical errors.

## 2025-07-07 NOTE — PLAN OF CARE
Problem: Pain  Goal: Verbalizes/displays adequate comfort level or baseline comfort level  7/7/2025 0041 by Jericho Carballo RN  Outcome: Progressing  7/6/2025 1812 by Joan Melo RN  Flowsheets (Taken 7/6/2025 1812)  Verbalizes/displays adequate comfort level or baseline comfort level:   Administer analgesics based on type and severity of pain and evaluate response   Assess pain using appropriate pain scale     Problem: Skin/Tissue Integrity - Adult  Goal: Skin integrity remains intact  Outcome: Progressing     Problem: Musculoskeletal - Adult  Goal: Return mobility to safest level of function  Outcome: Progressing     Problem: Musculoskeletal - Adult  Goal: Return ADL status to a safe level of function  Outcome: Progressing     Problem: Infection - Adult  Goal: Absence of infection at discharge  Outcome: Progressing     Problem: Metabolic/Fluid and Electrolytes - Adult  Goal: Glucose maintained within prescribed range  Outcome: Progressing

## 2025-07-07 NOTE — PROGRESS NOTES
ACUTE PHYSICAL THERAPY GOALS:   (Developed with and agreed upon by patient and/or caregiver.)    (1.) Dennis Santos  will move from supine to sit and sit to supine  with SUPERVISION within 7 treatment day(s).    (2.) Dennis Santos will transfer from bed to chair and chair to bed with SUPERVISION using the least restrictive device within 7 treatment day(s).    (3.) Dennis Santos will ambulate with SUPERVISION for 250 feet with the least restrictive device within 7 treatment day(s).   (4.) Dennis Santos will perform standing static and dynamic balance activities x 20 minutes with STAND BY ASSIST to improve safety within 7 treatment day(s).  (5.) Dennis Santos will perform therapeutic exercises for HEP with SUPERVISION to improve strength, endurance, and functional mobility within 7 treatment day(s).     PHYSICAL THERAPY Initial Assessment and AM  (Link to Caseload Tracking: PT Visit Days : 1  Acknowledge Orders  Time In/Out  PT Charge Capture  Rehab Caseload Tracker    Dennis Santos is a 85 y.o. male   PRIMARY DIAGNOSIS: COVID-19  Generalized weakness [R53.1]  COVID-19 [U07.1]       Reason for Referral: Generalized Muscle Weakness (M62.81)  Difficulty in walking, Not elsewhere classified (R26.2)  Other abnormalities of gait and mobility (R26.89)  Observation: Payor: [employer]ICARE / Plan: [employer] CHOICE-O MEDICARE / Product Type: *No Product type* /     ASSESSMENT:     REHAB RECOMMENDATIONS:   Recommendation to date pending progress:  Setting:  Home Health Therapy    Equipment:    None     ASSESSMENT:  Mr. Santos is a 85 year old male who presents with above diagnosis.  Pt extremely hard of hearing and difficult to communicate with.  Pt does report that he utilizes a rolling walker at home. This date pt performs mobility including bed mobility, transfers, and ambulation in room with use of rolling walker.  Pt slow with mobility but no overt loss of balance noted today. Pt presents as 
Discharge instructions reviewed with Pt. Opportunity for questions and clarification given. IV removed and cath tip intact. Scripts sent to pharmacy. Education given to pt and pt was able to teach back. No needs at this time and pt waiting on ride to arrive.    
   Vision [x]     Hearing []  Very hard of hearing, has b/l hearing aids   Cognition  [x]     Perception [x]       MOBILITY: I Mod I S SBA CGA Min Mod Max Total  NT x2 Comments:   Bed Mobility    Rolling [] [] [] [] [] [] [] [] [] [] []    Supine to Sit [] [] [] [] [] [x] [] [] [] [] []    Scooting [] [] [] [x] [] [] [] [] [] [] []    Sit to Supine [] [] [] [] [] [] [] [] [] [] []    Transfers    Sit to Stand [] [] [] [x] [] [] [] [] [] [] []    Bed to Chair [] [] [] [x] [] [] [] [] [] [] []    Stand to Sit [] [] [] [x] [] [] [] [] [] [] []    Tub/Shower [] [] [] [] [] [] [] [] [] [] []     Toilet [] [] [] [] [] [] [] [] [] [] []      [] [] [] [] [] [] [] [] [] [] []    I=Independent, Mod I=Modified Independent, S=Supervision/Setup, SBA=Standby Assistance, CGA=Contact Guard Assistance, Min=Minimal Assistance, Mod=Moderate Assistance, Max=Maximal Assistance, Total=Total Assistance, NT=Not Tested    ACTIVITIES OF DAILY LIVING: I Mod I S SBA CGA Min Mod Max Total NT Comments   BASIC ADLs:              Upper Body Bathing  [] [] [] [] [] [] [] [] [] []     Lower Body Bathing [] [] [] [] [] [] [] [] [] []     Toileting [] [] [] [] [] [] [x] [] [] [] purewick   Upper Body Dressing [] [] [] [] [] [] [] [] [] []    Lower Body Dressing [] [] [] [] [] [x] [] [] [] []    Feeding [] [] [x] [] [] [] [] [] [] []    Grooming [] [] [] [] [] [] [] [] [] []    Personal Device Care [] [] [] [] [] [] [] [] [] []    Functional Mobility [] [] [] [x] [x] [] [] [] [] [] Rolling walker in room    I=Independent, Mod I=Modified Independent, S=Supervision/Setup, SBA=Standby Assistance, CGA=Contact Guard Assistance, Min=Minimal Assistance, Mod=Moderate Assistance, Max=Maximal Assistance, Total=Total Assistance, NT=Not Tested    PLAN:   FREQUENCY/DURATION   OT Plan of Care: 3 times/week for duration of hospital stay or until stated goals are met, whichever comes first.    PROBLEM LIST:   (Skilled intervention is medically necessary to

## 2025-07-07 NOTE — CARE COORDINATION
CASE MANAGEMENT ASSESSMENT NOTE    Patient is an 85 year old male with Generalized weakness and covid 19..      Patient assessment completed at bedside.  Patient presents to assessment alert and oriented, and answers questions appropriately.  Is very hard of hearing.  States that his hearing aids are out of battery. He lives at home with his spouse.  At baseline, he is independent with transfers.  Has a cane and walker.  Lives in a single story home with ramped entrance.  Patient is not an active , but family is able to provide transportation.  He has [employer].  Is established with PCP, Dr. Adriana Reyes.    PT/OT have evaluated patient and recommend home health.  A home health agency choice list was provided to patient.  He states he would like referral to be sent to [facility] home care as he has used their services in the past.  Referral sent.    At this time, anticipate patient to be discharged home with home health.  Case management will continue to follow.  Please notify if there are any changes.     Attending Physician: Manny Rosas MD  Admit Problem: Generalized weakness [R53.1]  COVID-19 [U07.1]  Date/Time of Admission: 7/6/2025  2:09 PM  Problem List:  Patient Active Problem List   Diagnosis    Obesity (BMI 30.0-34.9)    Blood clotting disorder    Arteriosclerosis of carotid artery    Malaise and fatigue    Dizziness and giddiness    Joint derangement    Hyperglyceridemia, pure    Muscle spasm of back    MERT on CPAP    Onychomycosis    Nontoxic uninodular goiter    Anemia    GERD (gastroesophageal reflux disease)    Hypercholesterolemia    Lumbar stenosis with neurogenic claudication    Peripheral neuropathy    Hematoma, subungual, great toe, left    Diabetic neuropathy, painful (HCC)    Pain in limb    Abnormal clinical finding    Acquired hypothyroidism    Hypersomnia    Ankle fracture, right    Nocturia    Disorder of fluid or electrolyte    Mononeuritis of lower extremity

## 2025-07-07 NOTE — ACP (ADVANCE CARE PLANNING)
Advance Care Planning   General Advance Care Planning (ACP) Conversation    Date of Conversation: 7/6/2025  Conducted with: Patient with Decision Making Capacity  Other persons present: None    Healthcare Decision Maker:    Primary Decision Maker: DanielleJeannette - Spouse - [phone]    Today we documented Decision Maker(s) consistent with Legal Next of Kin hierarchy.  Content/Action Overview:  DECLINED ACP Conversation - will revisit periodically    Length of Voluntary ACP Conversation in minutes:  <16 minutes (Non-Billable)    Jesse Mccarthy RN

## 2025-07-07 NOTE — CARE COORDINATION
Patient with discharge orders for today.  Patient will discharge home with [facility]: SN, PT, OT. No additional needs made known to CM. Patient has met all treatment goals and milestones for discharge. Family to provide transportation home. CM following until patient is discharged.        07/07/25 [id number]   Services At/After Discharge   Transition of Care Consult (CM Consult) Home Health   Internal Home Health No   Reason Outside Agency Chosen   (Patient choice)   Services At/After Discharge PT;OT;Nursing services    Resource Information Provided? No   Mode of Transport at Discharge Other (see comment)  (Family)   Confirm Follow Up Transport Family   Condition of Participation: Discharge Planning   The Plan for Transition of Care is related to the following treatment goals: Patient to discharge home with home health and return to baseline function.   The Patient and/or Patient Representative was provided with a Choice of Provider? Patient   The Patient and/Or Patient Representative agree with the Discharge Plan? Yes   Freedom of Choice list was provided with basic dialogue that supports the patient's individualized plan of care/goals, treatment preferences, and shares the quality data associated with the providers?  Yes

## 2025-07-11 LAB
BACTERIA SPEC CULT: NORMAL
BACTERIA SPEC CULT: NORMAL
SERVICE CMNT-IMP: NORMAL
SERVICE CMNT-IMP: NORMAL

## 2025-07-21 ENCOUNTER — APPOINTMENT (OUTPATIENT)
Dept: URBAN - METROPOLITAN AREA CLINIC 329 | Facility: CLINIC | Age: 85
Setting detail: DERMATOLOGY
End: 2025-07-21

## 2025-07-21 DIAGNOSIS — L57.8 OTHER SKIN CHANGES DUE TO CHRONIC EXPOSURE TO NONIONIZING RADIATION: ICD-10-CM

## 2025-07-21 DIAGNOSIS — Z71.89 OTHER SPECIFIED COUNSELING: ICD-10-CM

## 2025-07-21 DIAGNOSIS — L57.0 ACTINIC KERATOSIS: ICD-10-CM

## 2025-07-21 DIAGNOSIS — L82.0 INFLAMED SEBORRHEIC KERATOSIS: ICD-10-CM

## 2025-07-21 DIAGNOSIS — L81.4 OTHER MELANIN HYPERPIGMENTATION: ICD-10-CM

## 2025-07-21 DIAGNOSIS — L82.1 OTHER SEBORRHEIC KERATOSIS: ICD-10-CM

## 2025-07-21 DIAGNOSIS — D18.0 HEMANGIOMA: ICD-10-CM

## 2025-07-21 DIAGNOSIS — Z12.83 ENCOUNTER FOR SCREENING FOR MALIGNANT NEOPLASM OF SKIN: ICD-10-CM

## 2025-07-21 DIAGNOSIS — D22 MELANOCYTIC NEVI: ICD-10-CM

## 2025-07-21 PROBLEM — D22.61 MELANOCYTIC NEVI OF RIGHT UPPER LIMB, INCLUDING SHOULDER: Status: ACTIVE | Noted: 2025-07-21

## 2025-07-21 PROBLEM — D18.01 HEMANGIOMA OF SKIN AND SUBCUTANEOUS TISSUE: Status: ACTIVE | Noted: 2025-07-21

## 2025-07-21 PROBLEM — D22.62 MELANOCYTIC NEVI OF LEFT UPPER LIMB, INCLUDING SHOULDER: Status: ACTIVE | Noted: 2025-07-21

## 2025-07-21 PROBLEM — D22.5 MELANOCYTIC NEVI OF TRUNK: Status: ACTIVE | Noted: 2025-07-21

## 2025-07-21 PROCEDURE — ? SUNSCREEN RECOMMENDATIONS

## 2025-07-21 PROCEDURE — ? FULL BODY SKIN EXAM - DECLINED

## 2025-07-21 PROCEDURE — ? LIQUID NITROGEN

## 2025-07-21 PROCEDURE — ? COUNSELING

## 2025-07-21 PROCEDURE — ? TREATMENT REGIMEN

## 2025-07-21 ASSESSMENT — LOCATION SIMPLE DESCRIPTION DERM
LOCATION SIMPLE: LEFT UPPER BACK
LOCATION SIMPLE: LEFT EAR
LOCATION SIMPLE: LEFT SCALP
LOCATION SIMPLE: RIGHT FOREHEAD
LOCATION SIMPLE: LEFT FOREHEAD
LOCATION SIMPLE: RIGHT UPPER BACK
LOCATION SIMPLE: LEFT NOSE
LOCATION SIMPLE: SUPERIOR FOREHEAD
LOCATION SIMPLE: CHEST
LOCATION SIMPLE: NOSE
LOCATION SIMPLE: LEFT CHEEK
LOCATION SIMPLE: LEFT ZYGOMA
LOCATION SIMPLE: RIGHT CHEEK
LOCATION SIMPLE: ABDOMEN
LOCATION SIMPLE: RIGHT EAR
LOCATION SIMPLE: RIGHT LOWER BACK
LOCATION SIMPLE: RIGHT FOREARM
LOCATION SIMPLE: SCALP
LOCATION SIMPLE: ANTERIOR SCALP
LOCATION SIMPLE: LEFT FOREARM
LOCATION SIMPLE: RIGHT ZYGOMA

## 2025-07-21 ASSESSMENT — LOCATION DETAILED DESCRIPTION DERM
LOCATION DETAILED: RIGHT MEDIAL FOREHEAD
LOCATION DETAILED: RIGHT SUPERIOR MEDIAL UPPER BACK
LOCATION DETAILED: LEFT INFERIOR UPPER BACK
LOCATION DETAILED: NASAL SUPRATIP
LOCATION DETAILED: RIGHT SUPERIOR CENTRAL BUCCAL CHEEK
LOCATION DETAILED: RIGHT SUPERIOR FOREHEAD
LOCATION DETAILED: LEFT SUPERIOR LATERAL MALAR CHEEK
LOCATION DETAILED: LEFT PROXIMAL RADIAL DORSAL FOREARM
LOCATION DETAILED: LEFT LATERAL BUCCAL CHEEK
LOCATION DETAILED: STERNAL NOTCH
LOCATION DETAILED: RIGHT PROXIMAL DORSAL FOREARM
LOCATION DETAILED: LEFT CENTRAL PARIETAL SCALP
LOCATION DETAILED: LEFT MEDIAL ZYGOMA
LOCATION DETAILED: LEFT INFERIOR FOREHEAD
LOCATION DETAILED: LEFT SUPERIOR FRONTAL SCALP
LOCATION DETAILED: EPIGASTRIC SKIN
LOCATION DETAILED: LEFT MEDIAL FOREHEAD
LOCATION DETAILED: LEFT LATERAL MANDIBULAR CHEEK
LOCATION DETAILED: LEFT PROXIMAL DORSAL FOREARM
LOCATION DETAILED: MID-FRONTAL SCALP
LOCATION DETAILED: LEFT SUPERIOR LATERAL BUCCAL CHEEK
LOCATION DETAILED: LEFT CENTRAL MALAR CHEEK
LOCATION DETAILED: LEFT CENTRAL FRONTAL SCALP
LOCATION DETAILED: RIGHT SUPERIOR CRUS OF ANTIHELIX
LOCATION DETAILED: RIGHT INFERIOR HELIX
LOCATION DETAILED: LEFT LATERAL FOREHEAD
LOCATION DETAILED: RIGHT MEDIAL ZYGOMA
LOCATION DETAILED: LEFT MEDIAL UPPER BACK
LOCATION DETAILED: SUPERIOR MID FOREHEAD
LOCATION DETAILED: LEFT DISTAL DORSAL FOREARM
LOCATION DETAILED: RIGHT SUPERIOR MEDIAL LOWER BACK
LOCATION DETAILED: LEFT SUPERIOR CENTRAL MALAR CHEEK
LOCATION DETAILED: STERNUM
LOCATION DETAILED: RIGHT SUPERIOR HELIX
LOCATION DETAILED: LEFT FOREHEAD
LOCATION DETAILED: LEFT MEDIAL FRONTAL SCALP
LOCATION DETAILED: LEFT SUPERIOR HELIX
LOCATION DETAILED: RIGHT SUPERIOR MEDIAL FOREHEAD
LOCATION DETAILED: PERIUMBILICAL SKIN
LOCATION DETAILED: LEFT NASAL ALA
LOCATION DETAILED: RIGHT CENTRAL MALAR CHEEK
LOCATION DETAILED: RIGHT SUPERIOR MEDIAL MIDBACK

## 2025-07-21 ASSESSMENT — LOCATION ZONE DERM
LOCATION ZONE: SCALP
LOCATION ZONE: EAR
LOCATION ZONE: NOSE
LOCATION ZONE: ARM
LOCATION ZONE: TRUNK
LOCATION ZONE: FACE

## 2025-07-21 NOTE — PROCEDURE: LIQUID NITROGEN
Show Applicator Variable?: Yes
Post-Care Instructions: I reviewed with the patient in detail post-care instructions. Patient is to wear sunprotection, and avoid picking at any of the treated lesions. Pt may apply Vaseline to crusted or scabbing areas.
Duration Of Freeze Thaw-Cycle (Seconds): 0
Render Note In Bullet Format When Appropriate: No
Detail Level: Detailed
Consent: The patient's consent was obtained including but not limited to risks of crusting, scabbing, blistering, scarring, darker or lighter pigmentary change, recurrence, incomplete removal and infection.
Medical Necessity Clause: This procedure was medically necessary because the lesions that were treated were: bleeding and enlarging
Medical Necessity Information: It is in your best interest to select a reason for this procedure from the list below. All of these items fulfill various CMS LCD requirements except the new and changing color options.
Spray Paint Text: The liquid nitrogen was applied to the skin utilizing a spray paint frosting technique.

## 2025-07-21 NOTE — HPI: SKIN LESION
What Type Of Note Output Would You Prefer (Optional)?: Bullet Format
How Severe Is Your Skin Lesion?: mild
Is This A New Presentation, Or A Follow-Up?: Skin Lesions
Additional History: Patient presents for above the waist exam.

## 2025-08-21 ENCOUNTER — TRANSCRIBE ORDERS (OUTPATIENT)
Dept: SCHEDULING | Age: 85
End: 2025-08-21

## 2025-08-21 DIAGNOSIS — R22.42 LOCALIZED SWELLING OF LEFT LOWER LEG: Primary | ICD-10-CM

## (undated) DEVICE — CANNULA NSL ORAL AD FOR CAPNOFLEX CO2 O2 AIRLFE

## (undated) DEVICE — SYR 3ML LL TIP 1/10ML GRAD --

## (undated) DEVICE — SINGLE PORT MANIFOLD: Brand: NEPTUNE 2

## (undated) DEVICE — BLOCK BITE AD 60FR W/ VELC STRP ADDRESSES MOST PT AND

## (undated) DEVICE — BUTTON SWITCH PENCIL BLADE ELECTRODE, HOLSTER: Brand: EDGE

## (undated) DEVICE — ENDOSCOPIC KIT 1.1+ OP4 CA DE 2 GWN AAMI LEVEL 3

## (undated) DEVICE — CONTAINER PREFIL FRMLN 40ML --

## (undated) DEVICE — FORCEPS BX L240CM JAW DIA2.8MM L CAP W/ NDL MIC MESH TOOTH

## (undated) DEVICE — TRAP SPEC POLYP REM STRNR CLN DSGN MAGNIFYING WIND DISP

## (undated) DEVICE — GAUZE,SPONGE,4"X4",12PLY,WOVEN,NS,LF: Brand: MEDLINE

## (undated) DEVICE — GUIDEWIRE ORTH L150MM DIA0.053IN W/ TRCR TIP FOR ANK FRAC

## (undated) DEVICE — BNDG,ELSTC,MATRIX,STRL,4"X5YD,LF,HOOK&LP: Brand: MEDLINE

## (undated) DEVICE — SUT ETHLN 3-0 18IN PS1 BLK --

## (undated) DEVICE — DRSG GZ OIL EMUL CURAD 3X8 --

## (undated) DEVICE — BANDAGE,GAUZE,BULKEE II,4.5"X4.1YD,STRL: Brand: MEDLINE

## (undated) DEVICE — SUTURE MCRYL SZ 3-0 L27IN ABSRB UD L19MM PS-2 3/8 CIR PRIM Y427H

## (undated) DEVICE — BALLOON US E LIN RNG O KT FOR FG-32UA

## (undated) DEVICE — KENDALL RADIOLUCENT FOAM MONITORING ELECTRODE RECTANGULAR SHAPE: Brand: KENDALL

## (undated) DEVICE — REM POLYHESIVE ADULT PATIENT RETURN ELECTRODE: Brand: VALLEYLAB

## (undated) DEVICE — FOOT DR TOLLISON & WOMACK: Brand: MEDLINE INDUSTRIES, INC.

## (undated) DEVICE — STERILE HOOK LOCK LATEX FREE ELASTIC BANDAGE 6INX5YD: Brand: HOOK LOCK™

## (undated) DEVICE — CONNECTOR TBNG OD5-7MM O2 END DISP

## (undated) DEVICE — SYRINGE, LUER SLIP, STERILE, 60ML: Brand: MEDLINE

## (undated) DEVICE — SOLUTION IRRIG 1000ML 09% SOD CHL USP PIC PLAS CONTAINER

## (undated) DEVICE — BNDG ELAS ESMARK 4INX12FT LF -- STRL

## (undated) DEVICE — SNARE POLYP SM AD W13MMXL240CM SHTH DIA2.4MM HEX STIFF

## (undated) DEVICE — BANDAGE,GAUZE,CONFORMING,4"X75",STRL,LF: Brand: MEDLINE

## (undated) DEVICE — SYR 5ML 1/5 GRAD LL NSAF LF --

## (undated) DEVICE — PAD,ABDOMINAL,5"X9",ST,LF,25/BX: Brand: MEDLINE INDUSTRIES, INC.

## (undated) DEVICE — PREP SKN CHLRAPRP APL 26ML STR --

## (undated) DEVICE — FIBULOCK IMPLANT SYSTEM

## (undated) DEVICE — CONTAINER FORMALIN PFILLED 10% NBF 40ML

## (undated) DEVICE — LUBE JELLY FOIL PACK 1.4 OZ: Brand: MEDLINE INDUSTRIES, INC.

## (undated) DEVICE — DRAPE SHT 3 QTR PROXIMA 53X77 --

## (undated) DEVICE — YANKAUER,BULB TIP,W/O VENT,RIGID,STERILE: Brand: MEDLINE

## (undated) DEVICE — SPLINT CAST W4XL30IN WHT THMB FBRGLS PRECUT INTLOK WRINKLE

## (undated) DEVICE — MOUTHPIECE ENDOSCP L CTRL OPN AND SIDE PORTS DISP

## (undated) DEVICE — NEEDLE SYR 18GA L1.5IN RED PLAS HUB S STL BLNT FILL W/O

## (undated) DEVICE — CONTAINER FORMALIN PREFILLED 10% NBF 60ML

## (undated) DEVICE — PADDING CAST W4INXL4YD ST COT COHESIVE HND TEARABLE SPEC

## (undated) DEVICE — NDL PRT INJ NSAF BLNT 18GX1.5 --

## (undated) DEVICE — C-ARM: Brand: UNBRANDED

## (undated) DEVICE — SPONGE GZ W4XL4IN COT 12 PLY TYP VII WVN C FLD DSGN

## (undated) DEVICE — SYRINGE MED 10ML LUERLOCK TIP W/O SFTY DISP

## (undated) DEVICE — SNARE POLYP SM W13MMXL240CM SHTH DIA2.4MM OVL FLX DISP

## (undated) DEVICE — AIRLIFE™ OXYGEN TUBING 7 FEET (2.1 M) CRUSH RESISTANT OXYGEN TUBING, VINYL TIPPED: Brand: AIRLIFE™

## (undated) DEVICE — THE TORRENT IRRIGATION TUBING IS INTENDED TO PROVIDE IRRIGATION VIA IRRIGATION FLUIDS, SUCH AS STERILE WATER, DURING GASTROINTESTINAL ENDOSCOPIC PROCEDURES WHEN USED IN CONJUNCTION WITH AN IRRIGATION PUMP OR ELECTROSURGICAL UNIT.: Brand: TORRENT

## (undated) DEVICE — CARDINAL HEALTH FLEXIBLE LIGHT HANDLE COVER: Brand: CARDINAL HEALTH

## (undated) DEVICE — SYRINGE MED 3ML CLR PLAS STD N CTRL LUERLOCK TIP DISP

## (undated) DEVICE — ZIMMER® STERILE DISPOSABLE TOURNIQUET CUFF WITH PLC, DUAL PORT, SINGLE BLADDER, 18 IN. (46 CM)